# Patient Record
Sex: MALE | Race: BLACK OR AFRICAN AMERICAN | NOT HISPANIC OR LATINO | Employment: OTHER | URBAN - METROPOLITAN AREA
[De-identification: names, ages, dates, MRNs, and addresses within clinical notes are randomized per-mention and may not be internally consistent; named-entity substitution may affect disease eponyms.]

---

## 2017-03-03 ENCOUNTER — HISTORICAL (OUTPATIENT)
Dept: ADMINISTRATIVE | Facility: HOSPITAL | Age: 67
End: 2017-03-03

## 2017-08-31 ENCOUNTER — HISTORICAL (OUTPATIENT)
Dept: LAB | Facility: HOSPITAL | Age: 67
End: 2017-08-31

## 2017-09-05 LAB — FINAL CULTURE: NORMAL

## 2017-09-06 LAB — FINAL CULTURE: NORMAL

## 2017-09-28 ENCOUNTER — HISTORICAL (OUTPATIENT)
Dept: NEUROSURGERY | Facility: CLINIC | Age: 67
End: 2017-09-28

## 2017-09-28 ENCOUNTER — HISTORICAL (OUTPATIENT)
Dept: ADMINISTRATIVE | Facility: HOSPITAL | Age: 67
End: 2017-09-28

## 2017-09-28 LAB
ABS NEUT (OLG): 3.2 X10(3)/MCL (ref 2.1–9.2)
APTT PPP: 28.4 SECOND(S) (ref 24.8–36.9)
BASOPHILS # BLD AUTO: 0 X10(3)/MCL (ref 0–0.2)
BASOPHILS NFR BLD AUTO: 0 %
BUN SERPL-MCNC: 18 MG/DL (ref 7–18)
CALCIUM SERPL-MCNC: 9.1 MG/DL (ref 8.5–10.1)
CHLORIDE SERPL-SCNC: 107 MMOL/L (ref 98–107)
CO2 SERPL-SCNC: 25 MMOL/L (ref 21–32)
CREAT SERPL-MCNC: 1.18 MG/DL (ref 0.7–1.3)
EOSINOPHIL # BLD AUTO: 0.1 X10(3)/MCL (ref 0–0.9)
EOSINOPHIL NFR BLD AUTO: 3 %
ERYTHROCYTE [DISTWIDTH] IN BLOOD BY AUTOMATED COUNT: 15.2 % (ref 11.5–17)
GLUCOSE SERPL-MCNC: 94 MG/DL (ref 74–106)
HCT VFR BLD AUTO: 33.4 % (ref 42–52)
HGB BLD-MCNC: 10.8 GM/DL (ref 14–18)
INR PPP: 1.1 (ref 0–1.27)
LYMPHOCYTES # BLD AUTO: 0.8 X10(3)/MCL (ref 0.6–4.6)
LYMPHOCYTES NFR BLD AUTO: 16 %
MCH RBC QN AUTO: 32.6 PG (ref 27–31)
MCHC RBC AUTO-ENTMCNC: 32.3 GM/DL (ref 33–36)
MCV RBC AUTO: 100.9 FL (ref 80–94)
MONOCYTES # BLD AUTO: 0.5 X10(3)/MCL (ref 0.1–1.3)
MONOCYTES NFR BLD AUTO: 10 %
NEUTROPHILS # BLD AUTO: 3.2 X10(3)/MCL (ref 1.4–7.9)
NEUTROPHILS NFR BLD AUTO: 70 %
PLATELET # BLD AUTO: 205 X10(3)/MCL (ref 130–400)
PMV BLD AUTO: 10.1 FL (ref 9.4–12.4)
POTASSIUM SERPL-SCNC: 4 MMOL/L (ref 3.5–5.1)
PROTHROMBIN TIME: 14 SECOND(S) (ref 12.2–14.7)
RBC # BLD AUTO: 3.31 X10(6)/MCL (ref 4.7–6.1)
SODIUM SERPL-SCNC: 142 MMOL/L (ref 136–145)
WBC # SPEC AUTO: 4.6 X10(3)/MCL (ref 4.5–11.5)

## 2017-10-05 ENCOUNTER — HISTORICAL (OUTPATIENT)
Dept: ADMINISTRATIVE | Facility: HOSPITAL | Age: 67
End: 2017-10-05

## 2017-10-05 ENCOUNTER — HISTORICAL (OUTPATIENT)
Dept: NEUROSURGERY | Facility: CLINIC | Age: 67
End: 2017-10-05

## 2017-10-12 ENCOUNTER — HISTORICAL (OUTPATIENT)
Dept: NEUROSURGERY | Facility: CLINIC | Age: 67
End: 2017-10-12

## 2017-10-12 ENCOUNTER — HISTORICAL (OUTPATIENT)
Dept: ADMINISTRATIVE | Facility: HOSPITAL | Age: 67
End: 2017-10-12

## 2018-02-06 ENCOUNTER — HISTORICAL (OUTPATIENT)
Dept: ADMINISTRATIVE | Facility: HOSPITAL | Age: 68
End: 2018-02-06

## 2019-05-15 ENCOUNTER — HISTORICAL (OUTPATIENT)
Dept: RADIOLOGY | Facility: HOSPITAL | Age: 69
End: 2019-05-15

## 2019-06-10 ENCOUNTER — HISTORICAL (OUTPATIENT)
Dept: RADIOLOGY | Facility: HOSPITAL | Age: 69
End: 2019-06-10

## 2019-11-07 ENCOUNTER — HISTORICAL (OUTPATIENT)
Dept: ADMINISTRATIVE | Facility: HOSPITAL | Age: 69
End: 2019-11-07

## 2019-11-15 ENCOUNTER — HISTORICAL (OUTPATIENT)
Dept: LAB | Facility: HOSPITAL | Age: 69
End: 2019-11-15

## 2019-11-15 LAB
AMPHET UR QL SCN: NORMAL
BARBITURATE SCN PRESENT UR: NORMAL
BENZODIAZ UR QL SCN: NORMAL
CANNABINOIDS UR QL SCN: NORMAL
COCAINE UR QL SCN: NORMAL
MDMA UR QL SCN: NORMAL
METHADONE UR QL SCN: NORMAL
OPIATES UR QL SCN: NORMAL
PCP UR QL: NORMAL
PH UR STRIP.AUTO: 5.5 [PH] (ref 5–8)
TEMPERATURE, URINE (OHS): 22.8 DEGC (ref 20–25)

## 2019-11-18 ENCOUNTER — HISTORICAL (OUTPATIENT)
Dept: RADIOLOGY | Facility: HOSPITAL | Age: 69
End: 2019-11-18

## 2019-12-27 ENCOUNTER — OFFICE VISIT (OUTPATIENT)
Dept: UROLOGY | Facility: CLINIC | Age: 69
End: 2019-12-27
Payer: MEDICARE

## 2019-12-27 VITALS
WEIGHT: 217 LBS | SYSTOLIC BLOOD PRESSURE: 128 MMHG | DIASTOLIC BLOOD PRESSURE: 76 MMHG | BODY MASS INDEX: 29.39 KG/M2 | HEIGHT: 72 IN | RESPIRATION RATE: 18 BRPM

## 2019-12-27 DIAGNOSIS — C61 PROSTATE CANCER: ICD-10-CM

## 2019-12-27 DIAGNOSIS — N52.9 ERECTILE DYSFUNCTION, UNSPECIFIED ERECTILE DYSFUNCTION TYPE: Primary | ICD-10-CM

## 2019-12-27 PROCEDURE — 99214 OFFICE O/P EST MOD 30 MIN: CPT | Mod: S$GLB,,, | Performed by: UROLOGY

## 2019-12-27 PROCEDURE — 1159F PR MEDICATION LIST DOCUMENTED IN MEDICAL RECORD: ICD-10-PCS | Mod: S$GLB,,, | Performed by: UROLOGY

## 2019-12-27 PROCEDURE — 1159F MED LIST DOCD IN RCRD: CPT | Mod: S$GLB,,, | Performed by: UROLOGY

## 2019-12-27 PROCEDURE — 99214 PR OFFICE/OUTPT VISIT, EST, LEVL IV, 30-39 MIN: ICD-10-PCS | Mod: S$GLB,,, | Performed by: UROLOGY

## 2019-12-27 RX ORDER — BICALUTAMIDE 50 MG/1
50 TABLET, FILM COATED ORAL NIGHTLY
COMMUNITY
Start: 2018-11-06

## 2019-12-27 RX ORDER — CETIRIZINE HYDROCHLORIDE 10 MG/1
10 TABLET ORAL DAILY
COMMUNITY
Start: 2019-09-09

## 2019-12-27 RX ORDER — PANTOPRAZOLE SODIUM 40 MG/1
40 TABLET, DELAYED RELEASE ORAL DAILY
COMMUNITY
Start: 2019-12-16

## 2019-12-27 RX ORDER — FLUTICASONE PROPIONATE 50 MCG
1 SPRAY, SUSPENSION (ML) NASAL DAILY PRN
Status: ON HOLD | COMMUNITY
Start: 2019-10-23 | End: 2024-01-31 | Stop reason: ALTCHOICE

## 2019-12-27 RX ORDER — ALLOPURINOL 100 MG/1
100 TABLET ORAL
COMMUNITY
Start: 2019-11-15 | End: 2022-05-30

## 2019-12-27 RX ORDER — CLARITHROMYCIN 500 MG/1
TABLET, FILM COATED ORAL
Status: ON HOLD | COMMUNITY
Start: 2019-10-16 | End: 2022-07-27 | Stop reason: CLARIF

## 2019-12-27 RX ORDER — ATORVASTATIN CALCIUM 40 MG/1
40 TABLET, FILM COATED ORAL NIGHTLY
COMMUNITY
Start: 2016-01-24

## 2019-12-27 RX ORDER — COLCHICINE 0.6 MG/1
0.6 TABLET, FILM COATED ORAL 2 TIMES DAILY PRN
COMMUNITY
Start: 2019-10-10 | End: 2023-02-13

## 2019-12-27 RX ORDER — NITROGLYCERIN 0.4 MG/1
0.4 TABLET SUBLINGUAL EVERY 5 MIN PRN
COMMUNITY
Start: 2019-10-08

## 2019-12-27 RX ORDER — MELOXICAM 15 MG/1
15 TABLET ORAL
COMMUNITY
Start: 2019-11-18 | End: 2024-01-23 | Stop reason: CLARIF

## 2019-12-27 RX ORDER — ISOSORBIDE MONONITRATE 30 MG/1
30 TABLET, EXTENDED RELEASE ORAL NIGHTLY
COMMUNITY
Start: 2019-10-08

## 2019-12-27 RX ORDER — LISINOPRIL 20 MG/1
20 TABLET ORAL DAILY
COMMUNITY
Start: 2019-10-08

## 2019-12-27 RX ORDER — HYDROCODONE BITARTRATE AND ACETAMINOPHEN 10; 325 MG/1; MG/1
TABLET ORAL
COMMUNITY
Start: 2019-12-23 | End: 2022-05-20 | Stop reason: SDUPTHER

## 2019-12-27 RX ORDER — TOPIRAMATE 25 MG/1
25 TABLET ORAL
COMMUNITY
Start: 2019-11-15

## 2019-12-27 NOTE — PROGRESS NOTES
Subjective:       Patient ID: Rene Baer is a 69 y.o. male.    Chief Complaint: Other (SCHEDULE PROSTHESIS PLACEMENT)      HPI: 68 yo male with erectile dysfunction.  Patient also has prostate cancer which is being cared for elsewhere.  He had xrt and is on lupron and has had good psa readings.  In the past he has failed all oral agents and desires not to do self injection due to dexterity problems      Past Medical History:   Past Medical History:   Diagnosis Date    CAD (coronary artery disease)     Chronic headaches     Gout     HTN (hypertension)     Prostate cancer        Past Surgical Historical:   Past Surgical History:   Procedure Laterality Date    CERVICAL FUSION      INSERTION OF PACEMAKER      INTRAOPERATIVE RADIATION THERAPY      LUNG REMOVAL, PARTIAL          Medications:   Medication List with Changes/Refills   Current Medications    ALLOPURINOL (ZYLOPRIM) 100 MG TABLET    Take 100 mg by mouth.    ATORVASTATIN (LIPITOR) 40 MG TABLET    Take 40 mg by mouth.    BICALUTAMIDE (CASODEX) 50 MG TAB    Take 50 mg by mouth.    CETIRIZINE (ZYRTEC) 10 MG TABLET    Take 10 mg by mouth.    CLARITHROMYCIN (BIAXIN) 500 MG TABLET        COLCRYS 0.6 MG TABLET        FLUTICASONE PROPIONATE (FLONASE) 50 MCG/ACTUATION NASAL SPRAY        HYDROCODONE-ACETAMINOPHEN (NORCO)  MG PER TABLET        ISOSORBIDE MONONITRATE (IMDUR) 30 MG 24 HR TABLET        LEUPROLIDE ACETATE (LUPRON DEPOT IM)    Inject into the muscle.    LISINOPRIL (PRINIVIL,ZESTRIL) 20 MG TABLET        MELOXICAM (MOBIC) 15 MG TABLET        NITROGLYCERIN (NITROSTAT) 0.4 MG SL TABLET        PANTOPRAZOLE (PROTONIX) 40 MG TABLET        TOPIRAMATE (TOPAMAX) 25 MG TABLET            Past Social History:   Social History     Socioeconomic History    Marital status:      Spouse name: Not on file    Number of children: Not on file    Years of education: Not on file    Highest education level: Not on file   Occupational History    Not on file    Social Needs    Financial resource strain: Not on file    Food insecurity:     Worry: Not on file     Inability: Not on file    Transportation needs:     Medical: Not on file     Non-medical: Not on file   Tobacco Use    Smoking status: Never Smoker   Substance and Sexual Activity    Alcohol use: Yes     Comment: RARE    Drug use: Not on file    Sexual activity: Not on file   Lifestyle    Physical activity:     Days per week: Not on file     Minutes per session: Not on file    Stress: Not on file   Relationships    Social connections:     Talks on phone: Not on file     Gets together: Not on file     Attends Taoism service: Not on file     Active member of club or organization: Not on file     Attends meetings of clubs or organizations: Not on file     Relationship status: Not on file   Other Topics Concern    Not on file   Social History Narrative    Not on file       Allergies:   Review of patient's allergies indicates:   Allergen Reactions    Fentanyl         Family History: History reviewed. No pertinent family history.     Review of Systems:  Review of Systems   Constitutional: Negative for activity change and appetite change.   HENT: Negative for congestion and dental problem.    Eyes: Negative for visual disturbance.   Respiratory: Negative for chest tightness and shortness of breath.    Cardiovascular: Negative for chest pain.   Gastrointestinal: Negative for abdominal distention and abdominal pain.   Genitourinary: Negative for decreased urine volume, difficulty urinating, discharge, dysuria, enuresis, flank pain, frequency, genital sores, hematuria, penile pain, penile swelling, scrotal swelling, testicular pain and urgency.   Musculoskeletal: Negative for back pain and neck pain.   Skin: Negative for color change.   Neurological: Negative for dizziness.   Hematological: Negative for adenopathy.   Psychiatric/Behavioral: Negative for agitation, behavioral problems and confusion.        Physical Exam:  Physical Exam   Nursing note and vitals reviewed.  Constitutional: He is oriented to person, place, and time. He appears well-developed and well-nourished.   HENT:   Head: Normocephalic.   Eyes: Pupils are equal, round, and reactive to light.   Neck: Normal range of motion. Neck supple.   Cardiovascular: Normal rate, regular rhythm and normal heart sounds.    Pulmonary/Chest: Effort normal and breath sounds normal.   Abdominal: Soft. Bowel sounds are normal.   Genitourinary: Testes normal. Uncircumcised.         Musculoskeletal: Normal range of motion.   Neurological: He is alert and oriented to person, place, and time.   Skin: Skin is warm and dry.     Psychiatric: He has a normal mood and affect. His behavior is normal.       Assessment/Plan:     erectile dysfunction will plan penile prosthesis after getting cardiology clearance  Problem List Items Addressed This Visit     None

## 2020-01-10 ENCOUNTER — CLINICAL SUPPORT (OUTPATIENT)
Dept: UROLOGY | Facility: CLINIC | Age: 70
End: 2020-01-10
Payer: MEDICARE

## 2020-01-10 DIAGNOSIS — N52.9 ERECTILE DYSFUNCTION, UNSPECIFIED ERECTILE DYSFUNCTION TYPE: Primary | ICD-10-CM

## 2020-01-10 LAB
APPEARANCE, UA: CLEAR
APTT PPP: 25.5 SEC (ref 23.6–36.4)
BASOPHILS NFR SNV MANUAL: 0.4 % (ref 0–3)
BILIRUB UR QL STRIP: NEGATIVE MG/DL
BUN SERPL-MCNC: 21 MG/DL (ref 7–18)
BUN/CREAT SERPL: 16.27 RATIO (ref 7–18)
CALCIUM SERPL-MCNC: 9.5 MG/DL (ref 8.8–10.5)
CHLORIDE SERPL-SCNC: 106 MMOL/L (ref 100–108)
CO2 SERPL-SCNC: 25 MMOL/L (ref 21–32)
COLOR UR: NORMAL
CREAT SERPL-MCNC: 1.29 MG/DL (ref 0.7–1.3)
EOSINOPHIL NFR SNV MANUAL: 3.1 % (ref 1–3)
ERYTHROCYTE [DISTWIDTH] IN BLOOD BY AUTOMATED COUNT: 17 % (ref 12.5–18)
GFR ESTIMATION: > 60
GLUCOSE (UA): NORMAL MG/DL
GLUCOSE SERPL-MCNC: 101 MG/DL (ref 70–110)
HCT VFR BLD AUTO: 29 % (ref 42–52)
HGB BLD-MCNC: 9.1 G/DL (ref 14–18)
HGB UR QL STRIP: NEGATIVE /UL
INR PPP: 1 INR (ref 0.9–1.1)
KETONES UR QL STRIP: NEGATIVE MG/DL
LEUKOCYTE ESTERASE UR QL STRIP: NEGATIVE /UL
LYMPHOCYTES NFR SNV MANUAL: 15.7 % (ref 25–40)
MANUAL NRBC PER 100 CELLS: 0 %
MCH RBC QN AUTO: 28 PG (ref 27–31.2)
MCHC RBC AUTO-ENTMCNC: 31.4 G/DL (ref 31.8–35.4)
MCV RBC AUTO: 89.2 FL (ref 80–97)
MONOCYTES/100 LEUKOCYTES: 9.2 % (ref 1–15)
NEUTROPHILS NFR BLD: 3.73 10*3/UL (ref 1.8–7.7)
NEUTROPHILS NFR SNV MANUAL: 71.4 % (ref 37–80)
NITRITE UR QL STRIP: NEGATIVE
PH UR STRIP: 6 PH (ref 5–9)
PLATELETS: 251 10*3/UL (ref 142–424)
POTASSIUM SERPL-SCNC: 4 MMOL/L (ref 3.6–5.2)
PROT UR QL STRIP: NEGATIVE MG/DL
PROTHROMBIN TIME: 12.1 SEC (ref 10.6–13)
RBC # BLD AUTO: 3.25 10*6/UL (ref 4.7–6.1)
SODIUM BLD-SCNC: 140 MMOL/L (ref 135–145)
SP GR UR STRIP: 1.01 (ref 1–1.03)
SPECIMEN COLLECTION METHOD, URINE: NORMAL
UROBILINOGEN UR STRIP-ACNC: NORMAL MG/DL
WBC # BLD: 5.2 10*3/UL (ref 4.6–10.2)

## 2020-01-23 ENCOUNTER — OUTSIDE PLACE OF SERVICE (OUTPATIENT)
Dept: UROLOGY | Facility: CLINIC | Age: 70
End: 2020-01-23
Payer: MEDICARE

## 2020-01-23 PROCEDURE — 54405 INSERT MULTI-COMP PENIS PROS: CPT | Mod: ,,, | Performed by: UROLOGY

## 2020-01-23 PROCEDURE — 54405 PR INSERT,INFLATABLE PENILE PROSTHESIS: ICD-10-PCS | Mod: ,,, | Performed by: UROLOGY

## 2020-01-30 LAB — HEMOCCULT STL QL IA: NEGATIVE

## 2020-02-07 ENCOUNTER — OFFICE VISIT (OUTPATIENT)
Dept: UROLOGY | Facility: CLINIC | Age: 70
End: 2020-02-07
Payer: MEDICARE

## 2020-02-07 VITALS — DIASTOLIC BLOOD PRESSURE: 85 MMHG | RESPIRATION RATE: 16 BRPM | SYSTOLIC BLOOD PRESSURE: 144 MMHG

## 2020-02-07 DIAGNOSIS — N52.9 ERECTILE DYSFUNCTION, UNSPECIFIED ERECTILE DYSFUNCTION TYPE: Primary | ICD-10-CM

## 2020-02-07 PROCEDURE — 99024 POSTOP FOLLOW-UP VISIT: CPT | Mod: S$GLB,POP,, | Performed by: UROLOGY

## 2020-02-07 PROCEDURE — 99024 PR POST-OP FOLLOW-UP VISIT: ICD-10-PCS | Mod: S$GLB,POP,, | Performed by: UROLOGY

## 2020-02-07 NOTE — PROGRESS NOTES
Subjective:       Patient ID: Rene Baer is a 69 y.o. male.    Chief Complaint: Other (F/U penile prosthesis)      HPI: Post op prosthesis two weeks ago.  No complaints       Past Medical History:   Past Medical History:   Diagnosis Date    CAD (coronary artery disease)     Chronic headaches     Gout     HTN (hypertension)     Prostate cancer        Past Surgical Historical:   Past Surgical History:   Procedure Laterality Date    CERVICAL FUSION      INSERTION OF PACEMAKER      INTRAOPERATIVE RADIATION THERAPY      LUNG REMOVAL, PARTIAL      PENILE PROSTHESIS IMPLANT          Medications:   Medication List with Changes/Refills   Current Medications    ALLOPURINOL (ZYLOPRIM) 100 MG TABLET    Take 100 mg by mouth.    ATORVASTATIN (LIPITOR) 40 MG TABLET    Take 40 mg by mouth.    BICALUTAMIDE (CASODEX) 50 MG TAB    Take 50 mg by mouth.    CETIRIZINE (ZYRTEC) 10 MG TABLET    Take 10 mg by mouth.    CLARITHROMYCIN (BIAXIN) 500 MG TABLET        COLCRYS 0.6 MG TABLET        FLUTICASONE PROPIONATE (FLONASE) 50 MCG/ACTUATION NASAL SPRAY        HYDROCODONE-ACETAMINOPHEN (NORCO)  MG PER TABLET        ISOSORBIDE MONONITRATE (IMDUR) 30 MG 24 HR TABLET        LEUPROLIDE ACETATE (LUPRON DEPOT IM)    Inject into the muscle.    LISINOPRIL (PRINIVIL,ZESTRIL) 20 MG TABLET        MELOXICAM (MOBIC) 15 MG TABLET        NITROGLYCERIN (NITROSTAT) 0.4 MG SL TABLET        PANTOPRAZOLE (PROTONIX) 40 MG TABLET        TOPIRAMATE (TOPAMAX) 25 MG TABLET            Past Social History:   Social History     Socioeconomic History    Marital status:      Spouse name: Not on file    Number of children: Not on file    Years of education: Not on file    Highest education level: Not on file   Occupational History    Not on file   Social Needs    Financial resource strain: Not on file    Food insecurity:     Worry: Not on file     Inability: Not on file    Transportation needs:     Medical: Not on file     Non-medical:  Not on file   Tobacco Use    Smoking status: Never Smoker   Substance and Sexual Activity    Alcohol use: Yes     Comment: RARE    Drug use: Not on file    Sexual activity: Not on file   Lifestyle    Physical activity:     Days per week: Not on file     Minutes per session: Not on file    Stress: Not on file   Relationships    Social connections:     Talks on phone: Not on file     Gets together: Not on file     Attends Orthodox service: Not on file     Active member of club or organization: Not on file     Attends meetings of clubs or organizations: Not on file     Relationship status: Not on file   Other Topics Concern    Not on file   Social History Narrative    Not on file       Allergies:   Review of patient's allergies indicates:   Allergen Reactions    Fentanyl         Family History: History reviewed. No pertinent family history.     Review of Systems:  Review of Systems   Constitutional: Negative for activity change and appetite change.   HENT: Negative for congestion and dental problem.    Respiratory: Negative for chest tightness and shortness of breath.    Cardiovascular: Negative for chest pain.   Gastrointestinal: Negative for abdominal distention and abdominal pain.   Genitourinary: Negative for decreased urine volume, difficulty urinating, discharge, dysuria, enuresis, flank pain, frequency, genital sores, hematuria, penile pain, penile swelling, scrotal swelling, testicular pain and urgency.   Musculoskeletal: Negative for back pain and neck pain.   Neurological: Negative for dizziness.   Hematological: Negative for adenopathy.   Psychiatric/Behavioral: Negative for agitation, behavioral problems and confusion.       Physical Exam:  Physical Exam   Genitourinary:             Assessment/Plan:     post op penile prosthesis--fu in 4 weeks  Problem List Items Addressed This Visit     None

## 2020-03-06 ENCOUNTER — OFFICE VISIT (OUTPATIENT)
Dept: UROLOGY | Facility: CLINIC | Age: 70
End: 2020-03-06
Payer: MEDICARE

## 2020-03-06 VITALS — RESPIRATION RATE: 18 BRPM | SYSTOLIC BLOOD PRESSURE: 131 MMHG | DIASTOLIC BLOOD PRESSURE: 70 MMHG

## 2020-03-06 DIAGNOSIS — C61 PROSTATE CANCER: ICD-10-CM

## 2020-03-06 DIAGNOSIS — N52.9 ERECTILE DYSFUNCTION, UNSPECIFIED ERECTILE DYSFUNCTION TYPE: Primary | ICD-10-CM

## 2020-03-06 PROCEDURE — 99024 POSTOP FOLLOW-UP VISIT: CPT | Mod: S$GLB,POP,, | Performed by: UROLOGY

## 2020-03-06 PROCEDURE — 99024 PR POST-OP FOLLOW-UP VISIT: ICD-10-PCS | Mod: S$GLB,POP,, | Performed by: UROLOGY

## 2020-03-06 NOTE — PROGRESS NOTES
Subjective:       Patient ID: Rene Baer is a 69 y.o. male.    Chief Complaint: Other (4 week post op)      HPI: Post op penile prosthesis.  Also prostate cancer on lupron       Past Medical History:   Past Medical History:   Diagnosis Date    CAD (coronary artery disease)     Chronic headaches     Gout     HTN (hypertension)     Prostate cancer        Past Surgical Historical:   Past Surgical History:   Procedure Laterality Date    CERVICAL FUSION      INSERTION OF PACEMAKER      INTRAOPERATIVE RADIATION THERAPY      LUNG REMOVAL, PARTIAL      PENILE PROSTHESIS IMPLANT          Medications:   Medication List with Changes/Refills   Current Medications    ALLOPURINOL (ZYLOPRIM) 100 MG TABLET    Take 100 mg by mouth.    ATORVASTATIN (LIPITOR) 40 MG TABLET    Take 40 mg by mouth.    BICALUTAMIDE (CASODEX) 50 MG TAB    Take 50 mg by mouth.    CETIRIZINE (ZYRTEC) 10 MG TABLET    Take 10 mg by mouth.    CLARITHROMYCIN (BIAXIN) 500 MG TABLET        COLCRYS 0.6 MG TABLET        FLUTICASONE PROPIONATE (FLONASE) 50 MCG/ACTUATION NASAL SPRAY        HYDROCODONE-ACETAMINOPHEN (NORCO)  MG PER TABLET        ISOSORBIDE MONONITRATE (IMDUR) 30 MG 24 HR TABLET        LEUPROLIDE ACETATE (LUPRON DEPOT IM)    Inject into the muscle.    LISINOPRIL (PRINIVIL,ZESTRIL) 20 MG TABLET        MELOXICAM (MOBIC) 15 MG TABLET        NITROGLYCERIN (NITROSTAT) 0.4 MG SL TABLET        PANTOPRAZOLE (PROTONIX) 40 MG TABLET        TOPIRAMATE (TOPAMAX) 25 MG TABLET            Past Social History:   Social History     Socioeconomic History    Marital status:      Spouse name: Not on file    Number of children: Not on file    Years of education: Not on file    Highest education level: Not on file   Occupational History    Not on file   Social Needs    Financial resource strain: Not on file    Food insecurity:     Worry: Not on file     Inability: Not on file    Transportation needs:     Medical: Not on file     Non-medical:  Not on file   Tobacco Use    Smoking status: Never Smoker   Substance and Sexual Activity    Alcohol use: Yes     Comment: RARE    Drug use: Not on file    Sexual activity: Not on file   Lifestyle    Physical activity:     Days per week: Not on file     Minutes per session: Not on file    Stress: Not on file   Relationships    Social connections:     Talks on phone: Not on file     Gets together: Not on file     Attends Oriental orthodox service: Not on file     Active member of club or organization: Not on file     Attends meetings of clubs or organizations: Not on file     Relationship status: Not on file   Other Topics Concern    Not on file   Social History Narrative    Not on file       Allergies:   Review of patient's allergies indicates:   Allergen Reactions    Fentanyl         Family History: History reviewed. No pertinent family history.     Review of Systems:  Review of Systems   Constitutional: Negative for activity change and appetite change.   HENT: Negative for congestion and dental problem.    Respiratory: Negative for chest tightness and shortness of breath.    Cardiovascular: Negative for chest pain.   Gastrointestinal: Negative for abdominal distention and abdominal pain.   Genitourinary: Negative for decreased urine volume, difficulty urinating, discharge, dysuria, enuresis, flank pain, frequency, genital sores, hematuria, penile pain, penile swelling, scrotal swelling, testicular pain and urgency.   Musculoskeletal: Negative for back pain and neck pain.   Neurological: Negative for dizziness.   Hematological: Negative for adenopathy.   Psychiatric/Behavioral: Negative for agitation, behavioral problems and confusion.       Physical Exam:  Physical Exam   Nursing note and vitals reviewed.  Constitutional: He is oriented to person, place, and time. He appears well-developed and well-nourished.   HENT:   Head: Normocephalic.   Cardiovascular: Normal rate, regular rhythm and normal heart sounds.     Pulmonary/Chest: Effort normal and breath sounds normal.   Abdominal: Soft. Bowel sounds are normal.   Genitourinary:         Neurological: He is alert and oriented to person, place, and time.   Skin: Skin is warm and dry.         Assessment/Plan:     post op prosthesis may use it now    Prostate cancer is being treated by another rphysician    Fu one month  Problem List Items Addressed This Visit     None

## 2020-04-01 ENCOUNTER — OFFICE VISIT (OUTPATIENT)
Dept: UROLOGY | Facility: CLINIC | Age: 70
End: 2020-04-01
Payer: MEDICARE

## 2020-04-01 VITALS — SYSTOLIC BLOOD PRESSURE: 140 MMHG | HEART RATE: 64 BPM | DIASTOLIC BLOOD PRESSURE: 84 MMHG

## 2020-04-01 DIAGNOSIS — N52.9 ERECTILE DYSFUNCTION, UNSPECIFIED ERECTILE DYSFUNCTION TYPE: Primary | ICD-10-CM

## 2020-04-01 DIAGNOSIS — C61 PROSTATE CANCER: ICD-10-CM

## 2020-04-01 PROCEDURE — 99024 PR POST-OP FOLLOW-UP VISIT: ICD-10-PCS | Mod: S$GLB,POP,, | Performed by: UROLOGY

## 2020-04-01 PROCEDURE — 99024 POSTOP FOLLOW-UP VISIT: CPT | Mod: S$GLB,POP,, | Performed by: UROLOGY

## 2020-04-01 NOTE — PROGRESS NOTES
Subjective:       Patient ID: Rene Baer is a 69 y.o. male.    Chief Complaint: Other (prosthesis fu )      HPI: 69-year-old male, patient Dr. Webb, presents for one month re-evaluation.  Patient had a penile prosthesis placed in January 2020.  Patient has used his prosthesis.  He states he is not using it very often.  Patient denies complications.  Denies difficulty using pump.  Denies pain or burning urination.  Denies difficulty voiding.  Denies fever.  Denies weight loss.    Patient has history of prostate cancer.  He is on Lupron injections.  He is being managed by a doctor in the [facility].  No other urinary complaints.  All other health problems appear stable at this time.       Past Medical History:   Past Medical History:   Diagnosis Date    CAD (coronary artery disease)     Chronic headaches     Gout     HTN (hypertension)     Prostate cancer        Past Surgical Historical:   Past Surgical History:   Procedure Laterality Date    CERVICAL FUSION      INSERTION OF PACEMAKER      INTRAOPERATIVE RADIATION THERAPY      LUNG REMOVAL, PARTIAL      PENILE PROSTHESIS IMPLANT          Medications:   Medication List with Changes/Refills   Current Medications    ALLOPURINOL (ZYLOPRIM) 100 MG TABLET    Take 100 mg by mouth.    ATORVASTATIN (LIPITOR) 40 MG TABLET    Take 40 mg by mouth.    BICALUTAMIDE (CASODEX) 50 MG TAB    Take 50 mg by mouth.    CETIRIZINE (ZYRTEC) 10 MG TABLET    Take 10 mg by mouth.    CLARITHROMYCIN (BIAXIN) 500 MG TABLET        COLCRYS 0.6 MG TABLET        FLUTICASONE PROPIONATE (FLONASE) 50 MCG/ACTUATION NASAL SPRAY        HYDROCODONE-ACETAMINOPHEN (NORCO)  MG PER TABLET        ISOSORBIDE MONONITRATE (IMDUR) 30 MG 24 HR TABLET        LEUPROLIDE ACETATE (LUPRON DEPOT IM)    Inject into the muscle.    LISINOPRIL (PRINIVIL,ZESTRIL) 20 MG TABLET        MELOXICAM (MOBIC) 15 MG TABLET        NITROGLYCERIN (NITROSTAT) 0.4 MG SL TABLET        PANTOPRAZOLE (PROTONIX) 40 MG  TABLET        TOPIRAMATE (TOPAMAX) 25 MG TABLET            Past Social History:   Social History     Socioeconomic History    Marital status:      Spouse name: Not on file    Number of children: Not on file    Years of education: Not on file    Highest education level: Not on file   Occupational History    Not on file   Social Needs    Financial resource strain: Not on file    Food insecurity:     Worry: Not on file     Inability: Not on file    Transportation needs:     Medical: Not on file     Non-medical: Not on file   Tobacco Use    Smoking status: Never Smoker   Substance and Sexual Activity    Alcohol use: Yes     Comment: RARE    Drug use: Not on file    Sexual activity: Not on file   Lifestyle    Physical activity:     Days per week: Not on file     Minutes per session: Not on file    Stress: Not on file   Relationships    Social connections:     Talks on phone: Not on file     Gets together: Not on file     Attends Pentecostalism service: Not on file     Active member of club or organization: Not on file     Attends meetings of clubs or organizations: Not on file     Relationship status: Not on file   Other Topics Concern    Not on file   Social History Narrative    Not on file       Allergies:   Review of patient's allergies indicates:   Allergen Reactions    Fentanyl         Family History: History reviewed. No pertinent family history.     Review of Systems:  Review of Systems   Constitutional: Negative for activity change and appetite change.   HENT: Negative for congestion and dental problem.    Respiratory: Negative for chest tightness and shortness of breath.    Cardiovascular: Negative for chest pain.   Gastrointestinal: Negative for abdominal distention and abdominal pain.   Genitourinary: Negative for decreased urine volume, difficulty urinating, discharge, dysuria, enuresis, flank pain, frequency, genital sores, hematuria, penile pain, penile swelling, scrotal swelling,  testicular pain and urgency.   Musculoskeletal: Negative for back pain and neck pain.   Neurological: Negative for dizziness.   Hematological: Negative for adenopathy.   Psychiatric/Behavioral: Negative for agitation, behavioral problems and confusion.       Physical Exam:  Physical Exam   Nursing note and vitals reviewed.  Constitutional: He is oriented to person, place, and time. He appears well-developed and well-nourished.   HENT:   Head: Normocephalic.   Cardiovascular: Normal rate, regular rhythm and normal heart sounds.    Pulmonary/Chest: Effort normal and breath sounds normal.   Abdominal: Soft. Bowel sounds are normal.   Genitourinary: Penis normal.   Genitourinary Comments: Pump is functioning well.  Able to inflate and deflate pump without complications.  Patient tolerated well.   Neurological: He is alert and oriented to person, place, and time.   Skin: Skin is warm and dry.         Assessment/Plan:   1.  Erectile dysfunction:  Patient is doing well post penile placement in January 2020.  Patient states he is able to operate the pump without difficulty.  Follow-up was inflated and deflated in office without complications.  Patient tolerated well.  Patient instructed to inflate and deflate the pump 3 times a week regardless with her using it or not.  Patient stated understanding.    2.  Prostate cancer:  Patient is on Lupron injections, however he is seeing a doctor in the [place] area.  Patient encouraged keep his appointment without doctor.  Patient states he sees a doctor every 3 months.    Patient will follow up in 6 months for re-evaluation, sooner if needed.  Problem List Items Addressed This Visit     None      Visit Diagnoses     Erectile dysfunction, unspecified erectile dysfunction type    -  Primary    Prostate cancer

## 2020-06-11 ENCOUNTER — HISTORICAL (OUTPATIENT)
Dept: SURGERY | Facility: HOSPITAL | Age: 70
End: 2020-06-11

## 2020-06-11 LAB
ABS NEUT (OLG): 3.27 X10(3)/MCL (ref 2.1–9.2)
BASOPHILS # BLD AUTO: 0 X10(3)/MCL (ref 0–0.2)
BASOPHILS NFR BLD AUTO: 0 %
BUN SERPL-MCNC: 24.2 MG/DL (ref 8.4–25.7)
CALCIUM SERPL-MCNC: 9.2 MG/DL (ref 8.8–10)
CHLORIDE SERPL-SCNC: 106 MMOL/L (ref 98–107)
CO2 SERPL-SCNC: 21 MMOL/L (ref 23–31)
CREAT SERPL-MCNC: 1.18 MG/DL (ref 0.73–1.18)
CREAT/UREA NIT SERPL: 21
EOSINOPHIL # BLD AUTO: 0.2 X10(3)/MCL (ref 0–0.9)
EOSINOPHIL NFR BLD AUTO: 4 %
ERYTHROCYTE [DISTWIDTH] IN BLOOD BY AUTOMATED COUNT: 16 % (ref 11.5–17)
GLUCOSE SERPL-MCNC: 98 MG/DL (ref 82–115)
HCT VFR BLD AUTO: 32.8 % (ref 42–52)
HGB BLD-MCNC: 10.2 GM/DL (ref 14–18)
INR PPP: 1 (ref 0–1.3)
LYMPHOCYTES # BLD AUTO: 0.5 X10(3)/MCL (ref 0.6–4.6)
LYMPHOCYTES NFR BLD AUTO: 11 %
MCH RBC QN AUTO: 30.5 PG (ref 27–31)
MCHC RBC AUTO-ENTMCNC: 31.1 GM/DL (ref 33–36)
MCV RBC AUTO: 98.2 FL (ref 80–94)
MONOCYTES # BLD AUTO: 0.6 X10(3)/MCL (ref 0.1–1.3)
MONOCYTES NFR BLD AUTO: 13 %
NEUTROPHILS # BLD AUTO: 3.27 X10(3)/MCL (ref 2.1–9.2)
NEUTROPHILS NFR BLD AUTO: 71 %
PLATELET # BLD AUTO: 232 X10(3)/MCL (ref 130–400)
PMV BLD AUTO: 11 FL (ref 9.4–12.4)
POTASSIUM SERPL-SCNC: 4.2 MMOL/L (ref 3.5–5.1)
PROTHROMBIN TIME: 12.8 SECOND(S) (ref 11.1–13.7)
RBC # BLD AUTO: 3.34 X10(6)/MCL (ref 4.7–6.1)
SODIUM SERPL-SCNC: 138 MMOL/L (ref 136–145)
WBC # SPEC AUTO: 4.6 X10(3)/MCL (ref 4.5–11.5)

## 2020-06-18 ENCOUNTER — HISTORICAL (OUTPATIENT)
Dept: SURGERY | Facility: HOSPITAL | Age: 70
End: 2020-06-18

## 2020-07-07 ENCOUNTER — HISTORICAL (OUTPATIENT)
Dept: SURGERY | Facility: HOSPITAL | Age: 70
End: 2020-07-07

## 2020-11-11 ENCOUNTER — OFFICE VISIT (OUTPATIENT)
Dept: UROLOGY | Facility: CLINIC | Age: 70
End: 2020-11-11
Payer: MEDICARE

## 2020-11-11 VITALS
HEART RATE: 86 BPM | WEIGHT: 217 LBS | RESPIRATION RATE: 20 BRPM | DIASTOLIC BLOOD PRESSURE: 82 MMHG | SYSTOLIC BLOOD PRESSURE: 140 MMHG | BODY MASS INDEX: 29.39 KG/M2 | HEIGHT: 72 IN

## 2020-11-11 DIAGNOSIS — N52.9 ERECTILE DYSFUNCTION, UNSPECIFIED ERECTILE DYSFUNCTION TYPE: Primary | ICD-10-CM

## 2020-11-11 PROCEDURE — 99213 OFFICE O/P EST LOW 20 MIN: CPT | Mod: S$GLB,,, | Performed by: UROLOGY

## 2020-11-11 PROCEDURE — 99213 PR OFFICE/OUTPT VISIT, EST, LEVL III, 20-29 MIN: ICD-10-PCS | Mod: S$GLB,,, | Performed by: UROLOGY

## 2020-11-11 NOTE — PROGRESS NOTES
Subjective:       Patient ID: Rene Baer is a 69 y.o. male.    Chief Complaint: Erectile Dysfunction (6 mth F/U)      HPI: 70 yo male fu ED sp placement of penile prosthesis by me in past.  Pt also has prostate cancer being followed elsewhere.  Pt states prosthesis working well       Past Medical History:   Past Medical History:   Diagnosis Date    CAD (coronary artery disease)     Chronic headaches     Gout     HTN (hypertension)     Prostate cancer        Past Surgical Historical:   Past Surgical History:   Procedure Laterality Date    CERVICAL FUSION      INSERTION OF PACEMAKER      INTRAOPERATIVE RADIATION THERAPY      LUNG REMOVAL, PARTIAL      PENILE PROSTHESIS IMPLANT          Medications:   Medication List with Changes/Refills   Current Medications    ALLOPURINOL (ZYLOPRIM) 100 MG TABLET    Take 100 mg by mouth.    ATORVASTATIN (LIPITOR) 40 MG TABLET    Take 40 mg by mouth.    BICALUTAMIDE (CASODEX) 50 MG TAB    Take 50 mg by mouth.    CETIRIZINE (ZYRTEC) 10 MG TABLET    Take 10 mg by mouth.    CLARITHROMYCIN (BIAXIN) 500 MG TABLET        COLCRYS 0.6 MG TABLET        FLUTICASONE PROPIONATE (FLONASE) 50 MCG/ACTUATION NASAL SPRAY        HYDROCODONE-ACETAMINOPHEN (NORCO)  MG PER TABLET        ISOSORBIDE MONONITRATE (IMDUR) 30 MG 24 HR TABLET        LEUPROLIDE ACETATE (LUPRON DEPOT IM)    Inject into the muscle.    LISINOPRIL (PRINIVIL,ZESTRIL) 20 MG TABLET        MELOXICAM (MOBIC) 15 MG TABLET        NITROGLYCERIN (NITROSTAT) 0.4 MG SL TABLET        PANTOPRAZOLE (PROTONIX) 40 MG TABLET        TOPIRAMATE (TOPAMAX) 25 MG TABLET            Past Social History:   Social History     Socioeconomic History    Marital status:      Spouse name: Not on file    Number of children: Not on file    Years of education: Not on file    Highest education level: Not on file   Occupational History    Not on file   Social Needs    Financial resource strain: Not on file    Food insecurity     Worry:  Not on file     Inability: Not on file    Transportation needs     Medical: Not on file     Non-medical: Not on file   Tobacco Use    Smoking status: Never Smoker   Substance and Sexual Activity    Alcohol use: Yes     Comment: RARE    Drug use: Not on file    Sexual activity: Not on file   Lifestyle    Physical activity     Days per week: Not on file     Minutes per session: Not on file    Stress: Not on file   Relationships    Social connections     Talks on phone: Not on file     Gets together: Not on file     Attends Evangelical service: Not on file     Active member of club or organization: Not on file     Attends meetings of clubs or organizations: Not on file     Relationship status: Not on file   Other Topics Concern    Not on file   Social History Narrative    Not on file       Allergies:   Review of patient's allergies indicates:   Allergen Reactions    Fentanyl         Family History: History reviewed. No pertinent family history.     Review of Systems:  Review of Systems   Constitutional: Negative for activity change and appetite change.   HENT: Negative for congestion and dental problem.    Respiratory: Negative for chest tightness and shortness of breath.    Cardiovascular: Negative for chest pain.   Gastrointestinal: Negative for abdominal distention and abdominal pain.   Genitourinary: Negative for decreased urine volume, difficulty urinating, discharge, dysuria, enuresis, flank pain, frequency, genital sores, hematuria, penile pain, penile swelling, scrotal swelling, testicular pain and urgency.   Musculoskeletal: Negative for back pain and neck pain.   Neurological: Negative for dizziness.   Hematological: Negative for adenopathy.   Psychiatric/Behavioral: Negative for agitation, behavioral problems and confusion.       Physical Exam:  Physical Exam  Vitals signs and nursing note reviewed.   Constitutional:       Appearance: He is well-developed.   HENT:      Head: Normocephalic.    Cardiovascular:      Rate and Rhythm: Normal rate and regular rhythm.      Heart sounds: Normal heart sounds.   Pulmonary:      Effort: Pulmonary effort is normal.      Breath sounds: Normal breath sounds.   Abdominal:      General: Bowel sounds are normal.      Palpations: Abdomen is soft.   Genitourinary:      Skin:     General: Skin is warm and dry.   Neurological:      Mental Status: He is alert and oriented to person, place, and time.         Assessment/Plan:     ED with well functioning prosthesis fu in one year  Problem List Items Addressed This Visit     None

## 2020-12-08 ENCOUNTER — HISTORICAL (OUTPATIENT)
Dept: RADIOLOGY | Facility: HOSPITAL | Age: 70
End: 2020-12-08

## 2021-01-14 ENCOUNTER — HISTORICAL (OUTPATIENT)
Dept: SURGERY | Facility: HOSPITAL | Age: 71
End: 2021-01-14

## 2021-01-14 LAB
ABS NEUT (OLG): 3.14 X10(3)/MCL (ref 2.1–9.2)
BASOPHILS # BLD AUTO: 0 X10(3)/MCL (ref 0–0.2)
BASOPHILS NFR BLD AUTO: 0 %
BUN SERPL-MCNC: 24.8 MG/DL (ref 8.4–25.7)
CALCIUM SERPL-MCNC: 8.9 MG/DL (ref 8.8–10)
CHLORIDE SERPL-SCNC: 105 MMOL/L (ref 98–107)
CO2 SERPL-SCNC: 24 MMOL/L (ref 23–31)
CREAT SERPL-MCNC: 1.12 MG/DL (ref 0.73–1.18)
CREAT/UREA NIT SERPL: 22
EOSINOPHIL # BLD AUTO: 0.1 X10(3)/MCL (ref 0–0.9)
EOSINOPHIL NFR BLD AUTO: 2 %
ERYTHROCYTE [DISTWIDTH] IN BLOOD BY AUTOMATED COUNT: 13.8 % (ref 11.5–17)
GLUCOSE SERPL-MCNC: 99 MG/DL (ref 82–115)
HCT VFR BLD AUTO: 31.5 % (ref 42–52)
HGB BLD-MCNC: 9.7 GM/DL (ref 14–18)
INR PPP: 1 (ref 0–1.3)
LYMPHOCYTES # BLD AUTO: 0.9 X10(3)/MCL (ref 0.6–4.6)
LYMPHOCYTES NFR BLD AUTO: 19 %
MCH RBC QN AUTO: 30.6 PG (ref 27–31)
MCHC RBC AUTO-ENTMCNC: 30.8 GM/DL (ref 33–36)
MCV RBC AUTO: 99.4 FL (ref 80–94)
MONOCYTES # BLD AUTO: 0.5 X10(3)/MCL (ref 0.1–1.3)
MONOCYTES NFR BLD AUTO: 11 %
NEUTROPHILS # BLD AUTO: 3.14 X10(3)/MCL (ref 2.1–9.2)
NEUTROPHILS NFR BLD AUTO: 67 %
PLATELET # BLD AUTO: 213 X10(3)/MCL (ref 130–400)
PMV BLD AUTO: 11.2 FL (ref 9.4–12.4)
POTASSIUM SERPL-SCNC: 4.6 MMOL/L (ref 3.5–5.1)
PROTHROMBIN TIME: 12.5 SECOND(S) (ref 11.1–13.7)
RBC # BLD AUTO: 3.17 X10(6)/MCL (ref 4.7–6.1)
SODIUM SERPL-SCNC: 138 MMOL/L (ref 136–145)
WBC # SPEC AUTO: 4.7 X10(3)/MCL (ref 4.5–11.5)

## 2021-03-31 ENCOUNTER — HISTORICAL (OUTPATIENT)
Dept: RADIOLOGY | Facility: HOSPITAL | Age: 71
End: 2021-03-31

## 2021-04-23 ENCOUNTER — HISTORICAL (OUTPATIENT)
Dept: RADIOLOGY | Facility: HOSPITAL | Age: 71
End: 2021-04-23

## 2021-04-26 ENCOUNTER — HISTORICAL (OUTPATIENT)
Dept: CARDIOLOGY | Facility: HOSPITAL | Age: 71
End: 2021-04-26

## 2021-06-09 ENCOUNTER — HISTORICAL (OUTPATIENT)
Dept: RADIOLOGY | Facility: HOSPITAL | Age: 71
End: 2021-06-09

## 2021-07-06 ENCOUNTER — HISTORICAL (OUTPATIENT)
Dept: SURGERY | Facility: HOSPITAL | Age: 71
End: 2021-07-06

## 2021-07-06 LAB
BUN SERPL-MCNC: 21.5 MG/DL (ref 8.4–25.7)
CALCIUM SERPL-MCNC: 9.7 MG/DL (ref 8.8–10)
CHLORIDE SERPL-SCNC: 109 MMOL/L (ref 98–107)
CO2 SERPL-SCNC: 23 MMOL/L (ref 23–31)
CREAT SERPL-MCNC: 1.09 MG/DL (ref 0.73–1.18)
CREAT/UREA NIT SERPL: 20
GLUCOSE SERPL-MCNC: 103 MG/DL (ref 82–115)
HCT VFR BLD AUTO: 31.8 % (ref 42–52)
HGB BLD-MCNC: 9.9 GM/DL (ref 14–18)
INR PPP: 1 (ref 0–1.3)
PLATELET # BLD AUTO: 256 X10(3)/MCL (ref 130–400)
POTASSIUM SERPL-SCNC: 4.5 MMOL/L (ref 3.5–5.1)
PROTHROMBIN TIME: 12.9 SECOND(S) (ref 12.5–14.5)
SODIUM SERPL-SCNC: 141 MMOL/L (ref 136–145)

## 2021-11-03 ENCOUNTER — HISTORICAL (OUTPATIENT)
Dept: LAB | Facility: HOSPITAL | Age: 71
End: 2021-11-03

## 2021-11-03 LAB
AMPHET UR QL SCN: NEGATIVE
BARBITURATE SCN PRESENT UR: NEGATIVE
BENZODIAZ UR QL SCN: NEGATIVE
CANNABINOIDS UR QL SCN: NEGATIVE
COCAINE UR QL SCN: NEGATIVE
FENTANYL UR QL SCN: NEGATIVE
MDMA UR QL SCN: NEGATIVE
METHADONE UR QL SCN: NEGATIVE
OPIATES UR QL SCN: POSITIVE
PCP UR QL: NEGATIVE
PH UR STRIP.AUTO: 5.5 [PH]

## 2021-11-10 ENCOUNTER — OFFICE VISIT (OUTPATIENT)
Dept: UROLOGY | Facility: CLINIC | Age: 71
End: 2021-11-10
Payer: MEDICARE

## 2021-11-10 DIAGNOSIS — N52.9 ERECTILE DYSFUNCTION, UNSPECIFIED ERECTILE DYSFUNCTION TYPE: Primary | ICD-10-CM

## 2021-11-10 PROCEDURE — 99213 OFFICE O/P EST LOW 20 MIN: CPT | Mod: S$GLB,,, | Performed by: NURSE PRACTITIONER

## 2021-11-10 PROCEDURE — 99213 PR OFFICE/OUTPT VISIT, EST, LEVL III, 20-29 MIN: ICD-10-PCS | Mod: S$GLB,,, | Performed by: NURSE PRACTITIONER

## 2021-11-10 RX ORDER — DICLOFENAC SODIUM 75 MG/1
75 TABLET, DELAYED RELEASE ORAL 2 TIMES DAILY PRN
COMMUNITY
Start: 2021-10-21 | End: 2024-01-23 | Stop reason: CLARIF

## 2021-11-10 RX ORDER — ASPIRIN 81 MG/1
81 TABLET ORAL EVERY 4 HOURS PRN
COMMUNITY
Start: 2021-06-30 | End: 2024-01-23 | Stop reason: CLARIF

## 2022-01-26 ENCOUNTER — HISTORICAL (OUTPATIENT)
Dept: LAB | Facility: HOSPITAL | Age: 72
End: 2022-01-26

## 2022-01-26 LAB
APPEARANCE, UA: CLEAR
BACTERIA SPEC CULT: ABNORMAL /HPF
BILIRUB UR QL STRIP: NEGATIVE
C3 SERPL-MCNC: 194 MG/DL (ref 80–173)
COLOR UR: YELLOW
CREAT UR-MCNC: 150.1 MG/DL (ref 58–161)
GLUCOSE (UA): NEGATIVE MG/DL
HGB UR QL STRIP: ABNORMAL
KETONES UR QL STRIP: NEGATIVE MG/DL
LEUKOCYTE ESTERASE UR QL STRIP: NEGATIVE
NITRITE UR QL STRIP: NEGATIVE
PH UR STRIP: 5 [PH] (ref 4.6–8)
PROT UR QL STRIP: NEGATIVE MG/DL
PROT UR STRIP-MCNC: 17 MG/DL
PROT/CREAT UR-RTO: 113 MG/DL
PSA SERPL-MCNC: <0.1 NG/ML
RBC #/AREA URNS HPF: ABNORMAL /HPF
SP GR UR STRIP: 1.02 (ref 1–1.03)
SQUAMOUS EPITHELIAL, UA: ABNORMAL
UROBILINOGEN UR STRIP-ACNC: 0.2 EU/DL
WBC #/AREA URNS HPF: ABNORMAL /[HPF]

## 2022-01-27 LAB
ANTINUCLEAR ANTIBODY SCREEN (OHS): NEGATIVE
DSDNA AB QUANT (OHS): 1.1
DSDNA ANTIBODY (OHS): NEGATIVE

## 2022-02-02 ENCOUNTER — HISTORICAL (OUTPATIENT)
Dept: RADIOLOGY | Facility: HOSPITAL | Age: 72
End: 2022-02-02

## 2022-02-02 ENCOUNTER — HISTORICAL (OUTPATIENT)
Dept: ADMINISTRATIVE | Facility: HOSPITAL | Age: 72
End: 2022-02-02

## 2022-04-11 ENCOUNTER — HISTORICAL (OUTPATIENT)
Dept: ADMINISTRATIVE | Facility: HOSPITAL | Age: 72
End: 2022-04-11
Payer: MEDICARE

## 2022-04-25 VITALS
WEIGHT: 229.25 LBS | SYSTOLIC BLOOD PRESSURE: 158 MMHG | BODY MASS INDEX: 31.05 KG/M2 | DIASTOLIC BLOOD PRESSURE: 82 MMHG | HEIGHT: 72 IN

## 2022-04-30 NOTE — OP NOTE
DATE OF SURGERY:    10/12/2017    SURGEON:  Alex Campbell MD    PREOPERATIVE DIAGNOSIS:  Lumbar degenerative disc disease, lumbar radiculopathy.    POSTOPERATIVE DIAGNOSIS:  Lumbar degenerative disc disease, lumbar radiculopathy.    PROCEDURE:  Fluoroscopically guided transforaminal lumbar epidural injections at left L3-4 and left L4-5.    EQUIPMENT USED:  Epidural tray.    DETAILED DESCRIPTION:  Following informed consent, the patient was prepped and draped in the usual sterile fashion.  I infiltrated the tissue overlying L3-4 and L4-5 with local anesthetic.  Under fluoroscopic guidance, I advanced a 22 gauge 3.5 inch BD spinal needle into the epidural space via left transforaminal approach.  Positioning was confirmed at each location with administration of contrast.  I then instilled divided between the two needles a combination of 160 mg Depo-Medrol and 1 ml of 5% dextrose in water.  I removed the needles and applied a sterile dressing.  The patient tolerated the procedure well without apparent complication.    IMPRESSION:  Successful fluoroscopically guided transforaminal lumbar epidural injection at left L3-4 and left L4-5.        ______________________________  MD STEVE Hernández/LEROY  DD:  10/12/2017  Time:  10:04AM  DT:  10/13/2017  Time:  08:57AM  Job #:  [id number]

## 2022-04-30 NOTE — OP NOTE
Patient:   Rene Baer            MRN: [id number]            FIN: [id number]               Age:   69 years     Sex:  Male     :  1950   Associated Diagnoses:   None   Author:   Alex Campbell MD      DATE OF SURGERY:    2020    SURGEON:  Alex Campbell MD    PREOPERATIVE DIAGNOSIS:  Lumbar radiculopathy.    POSTOPERATIVE DIAGNOSIS:  Lumbar radiculopathy.    PROCEDURE PERFORMED:  Fluoroscopically-guided transforaminal lumbar epidural injections at right L3-4, L4-5.    EQUIPMENT USED:  Epidural tray.    DESCRIPTION OF PROCEDURE:  Following informed consent, the patient was prepped and draped in the usual sterile fashion.  I infiltrated the tissue overlying L3-4, L4-5 with local anesthetic.  Under fluoroscopic guidance, I advanced a 22-gauge 3.5 inch BD spinal needle into the epidural space via right transforaminal approaches.  Positioning was confirmed at each location with administration of contrast.  I then instilled divided between the 2 needles a combination of 160 mg Depo-Medrol and 1 mL of 5% dextrose in water.  I removed the needles and applied sterile dressings.  The patient tolerated the procedure well without apparent complication.    IMPRESSION:  Successful fluoroscopically-guided transforaminal lumbar epidural injection at right L3-4, L4-5.

## 2022-04-30 NOTE — OP NOTE
Patient:   Rene Baer            MRN: [id number]            FIN: [id number]               Age:   70 years     Sex:  Male     :  1950   Associated Diagnoses:   None   Author:   Alex Campbell MD      DATE OF SURGERY:    2021    SURGEON:  Alex Campbell MD    PREOPERATIVE DIAGNOSIS:  Lumbar radiculopathy.    POSTOPERATIVE DIAGNOSIS:  Lumbar radiculopathy.    PROCEDURE PERFORMED:  Fluoroscopically-guided transforaminal lumbar epidural injections at right L3-4, L4-5.    EQUIPMENT USED:  Epidural tray.    DESCRIPTION OF PROCEDURE:  Following informed consent, the patient was prepped and draped in the usual sterile fashion.  I infiltrated the tissue overlying L3-4, L4-5 with local anesthetic.  Under fluoroscopic guidance, I advanced a 22-gauge 3.5 inch BD spinal needle into the epidural space via right transforaminal approaches.  Positioning was confirmed at each location with administration of contrast.  I then instilled divided between the 2 needles a combination of 160 mg Depo-Medrol and 1 mL of 5% dextrose in water.  I removed the needles and applied sterile dressings.  The patient tolerated the procedure well without apparent complication.    IMPRESSION:  Successful fluoroscopically-guided transforaminal lumbar epidural injection at right L3-4, L4-5.       Milli Shin

## 2022-04-30 NOTE — OP NOTE
Patient:   Rene Baer            MRN: [id number]            FIN: [id number]               Age:   69 years     Sex:  Male     :  1950   Associated Diagnoses:   None   Author:   Alex Campbell MD      DATE OF SURGERY:  20        SURGEON:  Alex Campbell MD    PREOPERATIVE DIAGNOSIS:  Cervical radiculopathy.    POSTOPERATIVE DIAGNOSE:  Cervical radiculopathy.    PROCEDURE PERFORMED:  Fluoroscopically-guided intralaminar cervical epidural injection at C4-5.    EQUIPMENT USED:  Epidural tray.    DETAILED DESCRIPTION:  Following informed consent, the patient was prepped and draped in the usual sterile fashion.  I infiltrated the tissue overlying C4-5with local anesthetic.  Under fluoroscopic guidance, I advanced a 25-gauge 3.5 inch BD spinal needle into the epidural space.  Positioning was confirmed with administration of contrast.  I then instilled a combination of 160 mg Depo-Medrol and 1 mL of 5% dextrose in water.  I removed the needle and applied a sterile dressing.  The patient tolerated the procedure well without apparent complication.    IMPRESSION:  Successful fluoroscopically-guided intralaminar cervical epidural injection at C4-5.

## 2022-04-30 NOTE — OP NOTE
DATE OF SURGERY:    10/05/2017    SURGEON:  Alex Campbell MD    PREOPERATIVE DIAGNOSIS:  Lumbar radiculopathy.    POSTOPERATIVE DIAGNOSIS:  Lumbar radiculopathy.    PROCEDURE:  Fluoroscopically guided transforaminal lumbar epidural injections at left L3-4 and left L4-5.    EQUIPMENT USED:  Epidural tray.    PROCEDURE IN DETAIL:  Following informed consent, the patient was prepped and draped in the usual sterile fashion.  I infiltrated the tissue overlying L3-4 and L4-5 with local anesthetic.  Under fluoroscopic guidance, I advanced a 22-gauge, 3.5 inch BD spinal needle into the epidural space via left transforaminal approaches.  Positioning was confirmed at each level with administration of contrast.  I then instilled, divided between the two needles, a combination of 160 mg of Depo-Medrol and 1 ml of 5% dextrose in water.  I removed the needles and applied a sterile dressing.  The patient tolerated the procedure well without apparent complication.    IMPRESSION:  Successful fluoroscopically guided transforaminal lumbar epidural injections at left L3-4 and left L4-5.        ______________________________  MD STEVE Hernández/ADAM  DD:  10/05/2017  Time:  11:58AM  DT:  10/06/2017  Time:  01:16PM  Job #:  [id number]

## 2022-04-30 NOTE — OP NOTE
Patient:   Rene Baer            MRN: [id number]            FIN: [id number]               Age:   69 years     Sex:  Male     :  1950   Associated Diagnoses:   None   Author:   Alex Campbell MD      DATE OF SURGERY:  20        SURGEON:  Alex Campbell MD    PREOPERATIVE DIAGNOSIS:  Cervical radiculopathy.    POSTOPERATIVE DIAGNOSE:  Cervical radiculopathy.    PROCEDURE PERFORMED:  Fluoroscopically-guided intralaminar cervical epidural injection at C4-5.    EQUIPMENT USED:  Epidural tray.    DETAILED DESCRIPTION:  Following informed consent, the patient was prepped and draped in the usual sterile fashion.  I infiltrated the tissue overlying C4-5with local anesthetic.  Under fluoroscopic guidance, I advanced a 25-gauge 3.5 inch BD spinal needle into the epidural space.  Positioning was confirmed with administration of contrast.  I then instilled a combination of 160 mg Depo-Medrol and 1 mL of 5% dextrose in water.  I removed the needle and applied a sterile dressing.  The patient tolerated the procedure well without apparent complication.    IMPRESSION:  Successful fluoroscopically-guided intralaminar cervical epidural injection at C4-5.

## 2022-04-30 NOTE — OP NOTE
Patient:   Rene Baer            MRN: [id number]            FIN: [id number]               Age:   69 years     Sex:  Male     :  1950   Associated Diagnoses:   None   Author:   Alex Campbell MD      DATE OF SURGERY:    2020    SURGEON:  Alex Campbell MD    PREOPERATIVE DIAGNOSIS:  Lumbar radiculopathy.    POSTOPERATIVE DIAGNOSIS:  Lumbar radiculopathy.    PROCEDURE PERFORMED:  Fluoroscopically-guided transforaminal lumbar epidural injections at right L3-4, L4-5.    EQUIPMENT USED:  Epidural tray.    DESCRIPTION OF PROCEDURE:  Following informed consent, the patient was prepped and draped in the usual sterile fashion.  I infiltrated the tissue overlying L3-4, L4-5 with local anesthetic.  Under fluoroscopic guidance, I advanced a 22-gauge 3.5 inch BD spinal needle into the epidural space via right transforaminal approaches.  Positioning was confirmed at each location with administration of contrast.  I then instilled divided between the 2 needles a combination of 160 mg Depo-Medrol and 1 mL of 5% dextrose in water.  I removed the needles and applied sterile dressings.  The patient tolerated the procedure well without apparent complication.    IMPRESSION:  Successful fluoroscopically-guided transforaminal lumbar epidural injection at right L3-4, L4-5.

## 2022-04-30 NOTE — OP NOTE
DATE OF SURGERY:    02/06/2018    SURGEON:  Alex Campbell MD    PREOPERATIVE DIAGNOSIS:  Lumbar degenerative disc disease, lumbar radiculopathy.    POSTOPERATIVE DIAGNOSIS:  Lumbar degenerative disc disease, lumbar radiculopathy.    PROCEDURE:  Fluoroscopically guided transforaminal lumbar epidural injections at left L3-4 and left L4-5.    EQUIPMENT USED:  Epidural tray.    DETAILED DESCRIPTION:  Following informed consent, the patient was prepped and draped in the usual sterile fashion.  I infiltrated the tissue overlying L3-4 and L4-5 with local anesthetic.  Under fluoroscopic guidance, I advanced a 22 gauge 3.5 inch BD spinal needle into the epidural space via left transforaminal approaches.  Positioning was confirmed at each location with administration of contrast.  I then instilled divided between the two needles a combination of 160 mg Depo-Medrol and 1 ml of 5% dextrose in water.  I removed the needles and applied a sterile dressing.  The patient tolerated the procedure well without apparent complication.    IMPRESSION:  Successful fluoroscopically guided transforaminal lumbar epidural injections left L3-4 and left L4-5.        ______________________________  Alex Campbell MD    DP/LEROY  DD:  02/06/2018  Time:  10:18AM  DT:  02/06/2018  Time:  03:39PM  Job #:  [id number]

## 2022-04-30 NOTE — OP NOTE
Patient:   Rene Baer            MRN: [id number]            FIN: [id number]               Age:   69 years     Sex:  Male     :  1950   Associated Diagnoses:   None   Author:   Alex Campbell MD      DATE OF SURGERY:    2020    SURGEON:  Alex Campbell MD    PREOPERATIVE DIAGNOSIS:  Lumbar radiculopathy.    POSTOPERATIVE DIAGNOSIS:  Lumbar radiculopathy.    PROCEDURE PERFORMED:  Fluoroscopically-guided transforaminal lumbar epidural injections at right L3-4, L4-5.    EQUIPMENT USED:  Epidural tray.    DESCRIPTION OF PROCEDURE:  Following informed consent, the patient was prepped and draped in the usual sterile fashion.  I infiltrated the tissue overlying L3-4, L4-5 with local anesthetic.  Under fluoroscopic guidance, I advanced a 22-gauge 3.5 inch BD spinal needle into the epidural space via right transforaminal approaches.  Positioning was confirmed at each location with administration of contrast.  I then instilled divided between the 2 needles a combination of 160 mg Depo-Medrol and 1 mL of 5% dextrose in water.  I removed the needles and applied sterile dressings.  The patient tolerated the procedure well without apparent complication.    IMPRESSION:  Successful fluoroscopically-guided transforaminal lumbar epidural injection at right L3-4, L4-5.

## 2022-04-30 NOTE — OP NOTE
Patient:   Rene Baer            MRN: [id number]            FIN: [id number]               Age:   70 years     Sex:  Male     :  1950   Associated Diagnoses:   None   Author:   Alex Campbell MD      DATE OF SURGERY:  2021       SURGEON:  Alex Campbell MD    PREOPERATIVE DIAGNOSIS:  Cervical radiculopathy.    POSTOPERATIVE DIAGNOSE:  Cervical radiculopathy.    PROCEDURE PERFORMED:  Fluoroscopically-guided intralaminar cervical epidural injection at C3-4.    EQUIPMENT USED:  Epidural tray.    DETAILED DESCRIPTION:  Following informed consent, the patient was prepped and draped in the usual sterile fashion.  I infiltrated the tissue overlying C3-4 with local anesthetic.  Under fluoroscopic guidance, I advanced a 25-gauge 3.5 inch BD spinal needle into the epidural space.  Positioning was confirmed with administration of contrast.  I then instilled a combination of 160 mg Depo-Medrol and 1 mL of 5% dextrose in water.  I removed the needle and applied a sterile dressing.  The patient tolerated the procedure well without apparent complication.    IMPRESSION:  Successful fluoroscopically-guided intralaminar cervical epidural injection at C3-4.

## 2022-04-30 NOTE — OP NOTE
DATE OF SURGERY:    09/28/2017    SURGEON:  Alex Campbell MD    PREOPERATIVE DIAGNOSIS:  Lumbar degenerative disk disease, lumbar radiculopathy.    POSTOPERATIVE DIAGNOSE:  Lumbar degenerative disk disease, lumbar radiculopathy.    PROCEDURE PERFORMED:  Fluoroscopically guided transforaminal lumbar epidural injections at left L3-4 and left L4-5.    EQUIPMENT USED:  Epidural tray.    PROCEDURE IN DETAIL:  Following informed consent, the patient was prepped and draped in the usual sterile fashion.  I infiltrated the tissue overlying L3-4 and L4-5 with local anesthetic.  Under fluoroscopic guidance, I advanced a 22-gauge, 3.5 inch BD spinal needle in the epidural space via left transforaminal approaches.  Positioning was confirmed with administration of contrast.  I then instilled, divided between the 2 needles, a combination of 160 mg Depo-Medrol and 1 mL of 5% dextrose in water.  I removed the needles and applied sterile dressing.  The patient tolerated the procedure well without apparent complication.    IMPRESSION:  Successful fluoroscopically-guided transforaminal lumbar epidural injections at left L3-4 and left L4-5.        ______________________________  Alex Campbell MD    DP/UH  DD:  09/28/2017  Time:  10:27AM  DT:  09/29/2017  Time:  10:29AM  Job #:  [id number]

## 2022-04-30 NOTE — OP NOTE
Patient:   Rene Baer            MRN: [id number]            FIN: [id number]               Age:   70 years     Sex:  Male     :  1950   Associated Diagnoses:   None   Author:   Alex Campbell MD      DATE OF SURGERY:  2021       SURGEON:  Alex Campbell MD    PREOPERATIVE DIAGNOSIS:  Cervical radiculopathy.    POSTOPERATIVE DIAGNOSE:  Cervical radiculopathy.    PROCEDURE PERFORMED:  Fluoroscopically-guided intralaminar cervical epidural injection at C3-4.    EQUIPMENT USED:  Epidural tray.    DETAILED DESCRIPTION:  Following informed consent, the patient was prepped and draped in the usual sterile fashion.  I infiltrated the tissue overlying C3-4 with local anesthetic.  Under fluoroscopic guidance, I advanced a 25-gauge 3.5 inch BD spinal needle into the epidural space.  Positioning was confirmed with administration of contrast.  I then instilled a combination of 160 mg Depo-Medrol and 1 mL of 5% dextrose in water.  I removed the needle and applied a sterile dressing.  The patient tolerated the procedure well without apparent complication.    IMPRESSION:  Successful fluoroscopically-guided intralaminar cervical epidural injection at C3-4.

## 2022-04-30 NOTE — OP NOTE
Patient:   Rene Baer            MRN: [id number]            FIN: [id number]               Age:   70 years     Sex:  Male     :  1950   Associated Diagnoses:   None   Author:   Alex Campbell MD      DATE OF SURGERY:    2021    SURGEON:  Alex Campbell MD    PREOPERATIVE DIAGNOSIS:  Lumbar radiculopathy.    POSTOPERATIVE DIAGNOSIS:  Lumbar radiculopathy.    PROCEDURE PERFORMED:  Fluoroscopically-guided transforaminal lumbar epidural injections at right L3-4, L4-5.    EQUIPMENT USED:  Epidural tray.    DESCRIPTION OF PROCEDURE:  Following informed consent, the patient was prepped and draped in the usual sterile fashion.  I infiltrated the tissue overlying L3-4, L4-5 with local anesthetic.  Under fluoroscopic guidance, I advanced a 22-gauge 3.5 inch BD spinal needle into the epidural space via right transforaminal approaches.  Positioning was confirmed at each location with administration of contrast.  I then instilled divided between the 2 needles a combination of 160 mg Depo-Medrol and 1 mL of 5% dextrose in water.  I removed the needles and applied sterile dressings.  The patient tolerated the procedure well without apparent complication.    IMPRESSION:  Successful fluoroscopically-guided transforaminal lumbar epidural injection at right L3-4, L4-5.

## 2022-04-30 NOTE — OP NOTE
Patient:   Rene Baer            MRN: [id number]            FIN: [id number]               Age:   69 years     Sex:  Male     :  1950   Associated Diagnoses:   None   Author:   Alex Campbell MD      DATE OF SURGERY:  20        SURGEON:  Alex Campbell MD    PREOPERATIVE DIAGNOSIS:  Cervical radiculopathy.    POSTOPERATIVE DIAGNOSE:  Cervical radiculopathy.    PROCEDURE PERFORMED:  Fluoroscopically-guided intralaminar cervical epidural injection at C4-5.    EQUIPMENT USED:  Epidural tray.    DETAILED DESCRIPTION:  Following informed consent, the patient was prepped and draped in the usual sterile fashion.  I infiltrated the tissue overlying C4-5with local anesthetic.  Under fluoroscopic guidance, I advanced a 25-gauge 3.5 inch BD spinal needle into the epidural space.  Positioning was confirmed with administration of contrast.  I then instilled a combination of 160 mg Depo-Medrol and 1 mL of 5% dextrose in water.  I removed the needle and applied a sterile dressing.  The patient tolerated the procedure well without apparent complication.    IMPRESSION:  Successful fluoroscopically-guided intralaminar cervical epidural injection at C4-5.

## 2022-05-02 PROBLEM — G56.03 BILATERAL CARPAL TUNNEL SYNDROME: Status: ACTIVE | Noted: 2022-05-02

## 2022-05-02 PROBLEM — G89.4 CHRONIC PAIN SYNDROME: Status: ACTIVE | Noted: 2022-05-02

## 2022-05-20 RX ORDER — HYDROCODONE BITARTRATE AND ACETAMINOPHEN 10; 325 MG/1; MG/1
1 TABLET ORAL EVERY 6 HOURS PRN
Qty: 120 TABLET | Refills: 0 | Status: SHIPPED | OUTPATIENT
Start: 2022-05-20 | End: 2022-07-19 | Stop reason: SDUPTHER

## 2022-05-23 RX ORDER — TIZANIDINE 4 MG/1
1 TABLET ORAL EVERY 8 HOURS PRN
COMMUNITY
Start: 2022-04-16 | End: 2022-05-23 | Stop reason: SDUPTHER

## 2022-05-23 RX ORDER — TIZANIDINE 4 MG/1
4 TABLET ORAL EVERY 8 HOURS PRN
Qty: 90 TABLET | Refills: 0 | Status: SHIPPED | OUTPATIENT
Start: 2022-05-23 | End: 2022-06-23 | Stop reason: SDUPTHER

## 2022-05-26 NOTE — PROGRESS NOTES
Subjective:      Patient ID: Rene Baer is a 71 y.o. male.    Chief Complaint: Pain (3mo f/u for pain. Pain in neck, R leg, lumbar. Aching pain. Pain level 7. Refill needed for Tamsulosin.)    Referred by: No ref. provider found     HPI    Interventional Pain History  Pt new to me. LOV with Dr Campbell on 02/07/2022 w/PLAN of consider SCS trial + CTS bilateral braces. He has not been able to purchase CTS braces as company did not have them in stock. He prefers having the braces sent to [facility] in [place], [place]. He has pertinent PSH of cervical spine fusion 20-25 years ago.  Patient is currently not interested in a spinal cord stimulator or returning to PT at this time. He last completed PT for his neck and low back several years ago. He continues home PT exercises.     Pain in neck has shocking sensation with neck turning and limited ROM with moving side to side. He also has aching pain and limited ROM s/p cervical spine fusion. Notes intermittent  jerking pain to right arm for couple of years. Denies spinal cord injury. Urinary retentions s/p prostate cancer w/radiation treatments completed.     Pain to bilateral low back feels like a heavy weight/bone on bone sensation. He also has right leg weakness noted as weakness to right groin will alternate to left groin. Ortho has not evaluated. He has not had a recent MRI L Spine     Pain Medications:   Norco 10/325 QID #120 (TID to QID)  Last filled on 05/27/2022  No diversion per [place]  Outside Provider/MD fills Gabapentin  Tizanidine 4mg takes 3-4 times a week  (no refills needed)  Mobic 15 mg by mouth + Diclofenac (alternates does not take at same time)        ROS  As noted in HPI      Objective:          Physical Exam   Gen NAD  EOMI, PERRLA, VFF  HEENT limited ROM w/lateral neck turning and flex/ext. Strong bilateral hand  w/out numbness to arms or back of neck/trapezius. Negative cervical paraspinal banding.   CV RRR,  Resp clear with even chest  expansion  Neuro  AAOx4  Speech fluent, appropriate  Motor 5/5 bilateral UE and LE  Sensation intact, TTP C and L-spine;   DTRs brisk bilateral patella, negative clonus, negative gamez  SLR on RIGHT stimulated + groin pain bilaterally, as did bilateral external rotation.   SLR LEFT normal  Coord intact heel to shin  Gait antalgic. Egress from chair and external rotation resulted in sharp pain to hip/groin region.           Assessment:       Encounter Diagnoses   Name Primary?    Chronic pain disorder Yes    Lumbar spondylosis     Cervical spondylosis without myelopathy     Carpal tunnel syndrome, unspecified laterality     Bilateral carpal tunnel syndrome     Cervical vertebral fusion syndrome          Plan:       Rene was seen today for pain.    Diagnoses and all orders for this visit:    Chronic pain disorder    Lumbar spondylosis    Cervical spondylosis without myelopathy    Carpal tunnel syndrome, unspecified laterality  -     HME - OTHER    Bilateral carpal tunnel syndrome    Cervical vertebral fusion syndrome       Pain Medications:   Norco 10/325 QID #120  Last filled on 05/27/2022  No diversion per [place]  Outside Provider/MD fills Gabapentin    Plan was to order L Spine MRI as patient is not intersested in SCS trial at this time. Also recommended PT repeat for bilateral groin/hip pain. States PT completed ears ago and he can not afford at this time. He will follow up with his PCP or Ortho to evaluate if his pain to bilateral hips with external rotation is due to his hips. If not, will need to further confirm if originating from L1/L2 nerve root.     Patient to continue pain meds as below and follow up with Dr. Campbell   in 3 months to discuss SCS trial and possible PT. Pt to call back sooner if he changes his mind and would like to proceed with a SCS trial. Bilateral wrist splints DME sent to [facility] in [facility].     Pain Medications:   Norco 10/325 QID #120 (TID to QID)  Last  filled on 05/27/2022  No diversion per [place]  Outside Provider/MD fills Gabapentin  Tizanidine 4mg takes 3-4 times a week  (no refills needed)  Mobic 15 mg by mouth + Diclofenac (alternates does not take at same time)    Relayed to patient Administrative changes with [facility] will forward on interventional pain procedures only and no longer prescribe opioids for chronic pain. Patient was asked to follow up with PCP to request a referral to an alternate Pain Medication MD to manage chronic opioids for pain treatment. He verbalized understanding

## 2022-05-30 ENCOUNTER — OFFICE VISIT (OUTPATIENT)
Dept: NEUROLOGY | Facility: CLINIC | Age: 72
End: 2022-05-30
Payer: MEDICARE

## 2022-05-30 VITALS
SYSTOLIC BLOOD PRESSURE: 130 MMHG | BODY MASS INDEX: 30.2 KG/M2 | HEIGHT: 72 IN | WEIGHT: 223 LBS | TEMPERATURE: 97 F | DIASTOLIC BLOOD PRESSURE: 70 MMHG

## 2022-05-30 DIAGNOSIS — Q76.1 CERVICAL VERTEBRAL FUSION SYNDROME: ICD-10-CM

## 2022-05-30 DIAGNOSIS — G89.4 CHRONIC PAIN DISORDER: Primary | ICD-10-CM

## 2022-05-30 DIAGNOSIS — M47.816 LUMBAR SPONDYLOSIS: ICD-10-CM

## 2022-05-30 DIAGNOSIS — G56.00 CARPAL TUNNEL SYNDROME, UNSPECIFIED LATERALITY: ICD-10-CM

## 2022-05-30 DIAGNOSIS — M47.812 CERVICAL SPONDYLOSIS WITHOUT MYELOPATHY: ICD-10-CM

## 2022-05-30 DIAGNOSIS — G56.03 BILATERAL CARPAL TUNNEL SYNDROME: ICD-10-CM

## 2022-05-30 PROCEDURE — 99215 PR OFFICE/OUTPT VISIT, EST, LEVL V, 40-54 MIN: ICD-10-PCS | Mod: S$PBB,,, | Performed by: NURSE PRACTITIONER

## 2022-05-30 PROCEDURE — 99999 PR PBB SHADOW E&M-EST. PATIENT-LVL IV: CPT | Mod: PBBFAC,,, | Performed by: NURSE PRACTITIONER

## 2022-05-30 PROCEDURE — 99215 OFFICE O/P EST HI 40 MIN: CPT | Mod: S$PBB,,, | Performed by: NURSE PRACTITIONER

## 2022-05-30 PROCEDURE — 99999 PR PBB SHADOW E&M-EST. PATIENT-LVL IV: ICD-10-PCS | Mod: PBBFAC,,, | Performed by: NURSE PRACTITIONER

## 2022-05-30 PROCEDURE — 99214 OFFICE O/P EST MOD 30 MIN: CPT | Mod: PBBFAC | Performed by: NURSE PRACTITIONER

## 2022-05-30 RX ORDER — FERROUS GLUCONATE 324(38)MG
1 TABLET ORAL
COMMUNITY
Start: 2022-03-29

## 2022-05-30 NOTE — PATIENT INSTRUCTIONS
Please follow up with your regular MD to have your hip pain evaluated with him/her or a referral to Ortho, since we can't order a MRI of your L spine until you complete PT.     Also ask your regular MD to recommend alternate Pain Specialists who focus on medication management to take over prescribing your pain meds within the next 30 days as [facility] policies for our [facility] no longer allow Dr Campbell to precribe your [place].

## 2022-06-07 DIAGNOSIS — M17.11 OSTEOARTHRITIS OF RIGHT KNEE: Primary | ICD-10-CM

## 2022-06-23 ENCOUNTER — HOSPITAL ENCOUNTER (OUTPATIENT)
Dept: RADIOLOGY | Facility: CLINIC | Age: 72
Discharge: HOME | End: 2022-06-23
Attending: ORTHOPAEDIC SURGERY
Payer: MEDICARE

## 2022-06-23 ENCOUNTER — OFFICE VISIT (OUTPATIENT)
Dept: ORTHOPEDICS | Facility: CLINIC | Age: 72
End: 2022-06-23
Payer: MEDICARE

## 2022-06-23 VITALS
BODY MASS INDEX: 30.2 KG/M2 | WEIGHT: 223 LBS | HEIGHT: 72 IN | SYSTOLIC BLOOD PRESSURE: 130 MMHG | DIASTOLIC BLOOD PRESSURE: 70 MMHG | HEART RATE: 86 BPM

## 2022-06-23 DIAGNOSIS — M16.11 PRIMARY OSTEOARTHRITIS OF RIGHT HIP: ICD-10-CM

## 2022-06-23 DIAGNOSIS — M47.812 CERVICAL SPONDYLOSIS WITHOUT MYELOPATHY: Primary | ICD-10-CM

## 2022-06-23 DIAGNOSIS — M47.816 LUMBAR SPONDYLOSIS: ICD-10-CM

## 2022-06-23 DIAGNOSIS — M17.11 OSTEOARTHRITIS OF RIGHT KNEE: Primary | ICD-10-CM

## 2022-06-23 DIAGNOSIS — M17.11 OSTEOARTHRITIS OF RIGHT KNEE: ICD-10-CM

## 2022-06-23 DIAGNOSIS — R52 PAIN MANAGEMENT: ICD-10-CM

## 2022-06-23 PROCEDURE — 73564 XR KNEE COMP 4 OR MORE VIEWS RIGHT: ICD-10-PCS | Mod: RT,,, | Performed by: ORTHOPAEDIC SURGERY

## 2022-06-23 PROCEDURE — 73502 XR HIP WITH PELVIS WHEN PERFORMED, 2 OR 3  VIEWS RIGHT: ICD-10-PCS | Mod: RT,,, | Performed by: ORTHOPAEDIC SURGERY

## 2022-06-23 PROCEDURE — 99213 PR OFFICE/OUTPT VISIT, EST, LEVL III, 20-29 MIN: ICD-10-PCS | Mod: ,,, | Performed by: ORTHOPAEDIC SURGERY

## 2022-06-23 PROCEDURE — 73564 X-RAY EXAM KNEE 4 OR MORE: CPT | Mod: RT,,, | Performed by: ORTHOPAEDIC SURGERY

## 2022-06-23 PROCEDURE — 73502 X-RAY EXAM HIP UNI 2-3 VIEWS: CPT | Mod: RT,,, | Performed by: ORTHOPAEDIC SURGERY

## 2022-06-23 PROCEDURE — 99213 OFFICE O/P EST LOW 20 MIN: CPT | Mod: ,,, | Performed by: ORTHOPAEDIC SURGERY

## 2022-06-23 RX ORDER — TIZANIDINE 4 MG/1
4 TABLET ORAL EVERY 8 HOURS PRN
Qty: 90 TABLET | Refills: 1 | Status: SHIPPED | OUTPATIENT
Start: 2022-06-23 | End: 2022-09-07 | Stop reason: SDUPTHER

## 2022-06-23 NOTE — PROGRESS NOTES
Chief Complaint:   Chief Complaint   Patient presents with    Right Knee - Pain    Pain     Right knee pain, patient states he didnt have an injury and thinks it might be from his hip     Consulting Physician: Teofilo Eden MD      History of present illness:    This is a 71 y.o. year old male presenting with complaints of right knee pain for several years.  This knee pain is moderate to severe. Patient states pain is worse with ambulation and activity, especially squats and lunges.  Patient states pain is global.  Patient has been treated previously with prescribed diclofenac and meloxicam.  He also complains of right hip pain for several years.  He reports pain in the right groin that worsens with ambulation and activity.  He states that he feels as if his knee pain is stemming from his hip pain.  Pain in the right hip is more notable than the right knee.  He also has a history of chronic neck and back pain followed by pain management.      Past Medical History:   Diagnosis Date    Adjustment disorder     Bilateral carpal tunnel syndrome     CAD (coronary artery disease)     Cervical radiculopathy     Chronic headaches     Chronic pain syndrome     Gout     HTN (hypertension)     Lumbar radiculopathy     Migraine     Osteoarthritis     Prostate cancer        Past Surgical History:   Procedure Laterality Date    APPENDECTOMY      CERVICAL FUSION      EPIDURAL STEROID INJECTION INTO CERVICAL SPINE      EPIDURAL STEROID INJECTION INTO LUMBAR SPINE      Fluoroscopy of spinal cord      INSERTION OF PACEMAKER      INSERTION OF PERMANENT PACEMAKER      INTRAOPERATIVE RADIATION THERAPY      LUNG REMOVAL, PARTIAL      Myelogram      Neck fusion      PENILE PROSTHESIS IMPLANT         Current Outpatient Medications   Medication Sig    aspirin (ECOTRIN) 81 MG EC tablet Take 81 mg by mouth.    atorvastatin (LIPITOR) 40 MG tablet Take 40 mg by mouth.    bicalutamide (CASODEX) 50 MG Tab Take 50 mg by  mouth.    cetirizine (ZYRTEC) 10 MG tablet Take 10 mg by mouth.    clarithromycin (BIAXIN) 500 MG tablet     COLCRYS 0.6 mg tablet     diclofenac (VOLTAREN) 75 MG EC tablet     ferrous gluconate (FERGON) 324 MG tablet Take 1 tablet by mouth 2 (two) times daily.    fluticasone propionate (FLONASE) 50 mcg/actuation nasal spray     HYDROcodone-acetaminophen (NORCO)  mg per tablet Take 1 tablet by mouth every 6 (six) hours as needed for Pain.    isosorbide mononitrate (IMDUR) 30 MG 24 hr tablet     leuprolide acetate (LUPRON DEPOT IM) Inject into the muscle.    lisinopril (PRINIVIL,ZESTRIL) 20 MG tablet     meloxicam (MOBIC) 15 MG tablet     multivitamin capsule Take 1 capsule by mouth once daily.    nitroGLYCERIN (NITROSTAT) 0.4 MG SL tablet     pantoprazole (PROTONIX) 40 MG tablet     potassium bicarbonate disintegrating tablet Take by mouth 2 (two) times daily.    topiramate (TOPAMAX) 25 MG tablet     tiZANidine (ZANAFLEX) 4 MG tablet Take 1 tablet (4 mg total) by mouth every 8 (eight) hours as needed (Muscpe spasms).     No current facility-administered medications for this visit.       Review of patient's allergies indicates:   Allergen Reactions    Fentanyl     Proparacaine     Tropicamide      Other reaction(s): disoriented       Family History   Problem Relation Age of Onset    Heart disease Mother     Hypertension Mother     Heart disease Sister     Hypertension Sister     Heart disease Brother     Hypertension Brother        Social History     Socioeconomic History    Marital status:    Tobacco Use    Smoking status: Never Smoker    Smokeless tobacco: Never Used   Substance and Sexual Activity    Alcohol use: Yes     Comment: RARE    Drug use: Never           Review of Systems:  All review of systems negative except for those stated in the HPI.    Examination:    Vital Signs:    Vitals:    06/23/22 0836   BP: 130/70   Pulse: 86   Weight: 101.2 kg (223 lb)   Height:  6' (1.829 m)       Body mass index is 30.24 kg/m².    Physical Exam:   General: Well-developed, well-nourished.  Neuro: Alert and oriented x 3.  Psych: Normal mood and affect.  Right Knee Exam:  Varus deformity. Range of motion from 5-110 degrees. Negative patella grind and equal subluxation of knee cap medial and lateral < 1cm. Negative patella tendon tenderness. Negative Lachman and anterior drawer test. Negative posterior drawer test. Negative varus and valgus stress test. Positive medial joint line tenderness. Negative lateral joint line tenderness. 4/5 strength and normal skin appearance. Sensibility normal.  Right Hip Exam:  No obvious deformity. Flexion to 120 degrees and internal and external rotation to 40 degrees. Abduction to 45 degree and adduction to 30 degrees.  Positive MARC test. Negative FADDIR test. No flexion contracture. Negative greater trochanteric tenderness. Negative MONET test. 4/5 strength, normal skin appearance and palpable pulses distally. Sensibility normal.      Imaging: X-rays ordered and images interpreted today personally by me of 4 views of the right knee demonstrate knee joint space narrowing and degenerative changes with a varus deformity. There is also subchondral sclerosis and osteophyte formation.  X-rays ordered and images interpreted today personally by me of 3 views of the right hip demonstrate degenerative changes with joint space narrowing.      Assessment: Osteoarthritis of right knee  -     Ambulatory referral/consult to Orthopedics  -     X-Ray Knee Complete 4 Or More Views Right; Future; Expected date: 06/23/2022  -     X-Ray Hip 2 or 3 views Right (with Pelvis when performed); Future; Expected date: 06/23/2022    Primary osteoarthritis of right hip    Pain management        Plan:  X-rays were reviewed with the patient.  In regards to his right knee, we discussed multiple options, including continued observation, medications, steroid injections, visco-supplementation  injections, bracing, and physical therapy.  He would like to defer further treatments for now.  Patient would like to pursue further treatment of his right hip due to increased symptoms severity.  I will refer him to one of my partners Tono Farah MD for further evaluation recommendations regarding his right hip.  He will return to clinic as needed for any additional issues or concerns.  He verbalized understanding plan of care with no further questions.    Followed by pain management specialist and currently prescribed Norco 10 mg/325 mg every 6 hours.         Follow up if symptoms worsen or fail to improve.      DISCLAIMER: This note may have been dictated using voice recognition software and may contain grammatical errors.     NOTE: Consult report sent to referring provider via [employer] EMR.

## 2022-07-19 ENCOUNTER — OFFICE VISIT (OUTPATIENT)
Dept: ORTHOPEDICS | Facility: CLINIC | Age: 72
End: 2022-07-19
Payer: MEDICARE

## 2022-07-19 VITALS
HEART RATE: 76 BPM | SYSTOLIC BLOOD PRESSURE: 123 MMHG | WEIGHT: 223.63 LBS | BODY MASS INDEX: 30.29 KG/M2 | DIASTOLIC BLOOD PRESSURE: 77 MMHG | TEMPERATURE: 98 F | HEIGHT: 72 IN

## 2022-07-19 DIAGNOSIS — M54.16 LUMBAR RADICULAR PAIN: ICD-10-CM

## 2022-07-19 DIAGNOSIS — G89.4 CHRONIC PAIN SYNDROME: Primary | ICD-10-CM

## 2022-07-19 DIAGNOSIS — M16.11 PRIMARY OSTEOARTHRITIS OF RIGHT HIP: Primary | ICD-10-CM

## 2022-07-19 PROCEDURE — 99213 PR OFFICE/OUTPT VISIT, EST, LEVL III, 20-29 MIN: ICD-10-PCS | Mod: ,,, | Performed by: ORTHOPAEDIC SURGERY

## 2022-07-19 PROCEDURE — 99213 OFFICE O/P EST LOW 20 MIN: CPT | Mod: ,,, | Performed by: ORTHOPAEDIC SURGERY

## 2022-07-19 RX ORDER — METOPROLOL SUCCINATE 25 MG/1
25 TABLET, EXTENDED RELEASE ORAL NIGHTLY
COMMUNITY
Start: 2022-07-02 | End: 2024-01-23

## 2022-07-19 RX ORDER — TAMSULOSIN HYDROCHLORIDE 0.4 MG/1
0.4 CAPSULE ORAL EVERY MORNING
COMMUNITY
Start: 2022-07-08

## 2022-07-19 RX ORDER — FLUTICASONE FUROATE AND VILANTEROL 200; 25 UG/1; UG/1
1 POWDER RESPIRATORY (INHALATION) DAILY PRN
Status: ON HOLD | COMMUNITY
End: 2024-01-31 | Stop reason: CLARIF

## 2022-07-19 RX ORDER — DORZOLAMIDE HYDROCHLORIDE AND TIMOLOL MALEATE 20; 5 MG/ML; MG/ML
1 SOLUTION/ DROPS OPHTHALMIC EVERY 12 HOURS PRN
Status: ON HOLD | COMMUNITY
End: 2024-01-31 | Stop reason: CLARIF

## 2022-07-19 RX ORDER — GABAPENTIN 300 MG/1
CAPSULE ORAL
COMMUNITY
Start: 2022-06-22 | End: 2022-07-31

## 2022-07-19 RX ORDER — SODIUM CHLORIDE 9 MG/ML
INJECTION, SOLUTION INTRAVENOUS CONTINUOUS
Status: CANCELLED | OUTPATIENT
Start: 2022-07-19

## 2022-07-19 RX ORDER — ALLOPURINOL 100 MG/1
100 TABLET ORAL DAILY PRN
COMMUNITY
Start: 2022-07-07 | End: 2023-02-13

## 2022-07-19 RX ORDER — POTASSIUM CITRATE 15 MEQ/1
1 TABLET, EXTENDED RELEASE ORAL 2 TIMES DAILY
COMMUNITY
Start: 2022-07-07

## 2022-07-19 RX ORDER — SERTRALINE HYDROCHLORIDE 50 MG/1
50 TABLET, FILM COATED ORAL NIGHTLY
COMMUNITY
Start: 2022-02-07 | End: 2022-09-06

## 2022-07-19 RX ORDER — ALBUTEROL SULFATE 90 UG/1
2 AEROSOL, METERED RESPIRATORY (INHALATION) EVERY 6 HOURS PRN
COMMUNITY
End: 2024-01-23 | Stop reason: CLARIF

## 2022-07-19 RX ORDER — MIRABEGRON 25 MG/1
25 TABLET, FILM COATED, EXTENDED RELEASE ORAL 2 TIMES DAILY
COMMUNITY
Start: 2022-07-07 | End: 2024-01-23 | Stop reason: CLARIF

## 2022-07-19 RX ORDER — LACTULOSE 10 G/15ML
30 SOLUTION ORAL; RECTAL 2 TIMES DAILY PRN
COMMUNITY
Start: 2022-05-31

## 2022-07-19 RX ORDER — TRIAMCINOLONE ACETONIDE 1 MG/G
1 OINTMENT TOPICAL 2 TIMES DAILY PRN
COMMUNITY
Start: 2022-06-23 | End: 2024-01-23 | Stop reason: CLARIF

## 2022-07-19 NOTE — H&P (VIEW-ONLY)
Past Medical History:   Diagnosis Date    Adjustment disorder     Bilateral carpal tunnel syndrome     CAD (coronary artery disease)     Cervical radiculopathy     Chronic headaches     Chronic pain syndrome     Gout     HTN (hypertension)     Lumbar radiculopathy     Migraine     Osteoarthritis     Prostate cancer        Past Surgical History:   Procedure Laterality Date    APPENDECTOMY      CERVICAL FUSION      EPIDURAL STEROID INJECTION INTO CERVICAL SPINE      EPIDURAL STEROID INJECTION INTO LUMBAR SPINE      Fluoroscopy of spinal cord      INSERTION OF PACEMAKER      INSERTION OF PERMANENT PACEMAKER      INTRAOPERATIVE RADIATION THERAPY      LUNG REMOVAL, PARTIAL      Myelogram      Neck fusion      PENILE PROSTHESIS IMPLANT         Current Outpatient Medications   Medication Sig    albuterol (PROVENTIL/VENTOLIN HFA) 90 mcg/actuation inhaler Inhale 2 puffs into the lungs every 6 (six) hours as needed.    allopurinoL (ZYLOPRIM) 100 MG tablet Take 100 mg by mouth once daily.    aspirin (ECOTRIN) 81 MG EC tablet Take 81 mg by mouth.    atorvastatin (LIPITOR) 40 MG tablet Take 40 mg by mouth.    bicalutamide (CASODEX) 50 MG Tab Take 50 mg by mouth.    cetirizine (ZYRTEC) 10 MG tablet Take 10 mg by mouth.    clarithromycin (BIAXIN) 500 MG tablet     COLCRYS 0.6 mg tablet     diclofenac (VOLTAREN) 75 MG EC tablet     dorzolamide-timolol 2-0.5% (COSOPT) 22.3-6.8 mg/mL ophthalmic solution 1 drop.    ferrous gluconate (FERGON) 324 MG tablet Take 1 tablet by mouth 2 (two) times daily.    fluticasone furoate-vilanteroL (BREO) 200-25 mcg/dose DsDv diskus inhaler Inhale 1 puff into the lungs.    fluticasone propionate (FLONASE) 50 mcg/actuation nasal spray     gabapentin (NEURONTIN) 300 MG capsule Take by mouth.    isosorbide mononitrate (IMDUR) 30 MG 24 hr tablet     lactulose (CHRONULAC) 10 gram/15 mL solution SMARTSIG:Milliliter(s) By Mouth    leuprolide acetate (LUPRON DEPOT IM)  Inject into the muscle.    lisinopril (PRINIVIL,ZESTRIL) 20 MG tablet     meloxicam (MOBIC) 15 MG tablet     metoprolol succinate (TOPROL-XL) 25 MG 24 hr tablet Take 25 mg by mouth once daily.    multivitamin capsule Take 1 capsule by mouth once daily.    MYRBETRIQ 25 mg Tb24 ER tablet Take 25 mg by mouth once daily.    nitroGLYCERIN (NITROSTAT) 0.4 MG SL tablet     pantoprazole (PROTONIX) 40 MG tablet     potassium bicarbonate disintegrating tablet Take by mouth 2 (two) times daily.    potassium citrate (UROCIT-K 15) 15 mEq TbSR Take 1 tablet by mouth 2 (two) times daily.    sertraline (ZOLOFT) 50 MG tablet Take 50 mg by mouth.    tamsulosin (FLOMAX) 0.4 mg Cap Take 1 capsule by mouth once daily.    tiZANidine (ZANAFLEX) 4 MG tablet Take 1 tablet (4 mg total) by mouth every 8 (eight) hours as needed (pain/spasms).    topiramate (TOPAMAX) 25 MG tablet     triamcinolone acetonide 0.1% (KENALOG) 0.1 % ointment Apply 1 application topically 2 (two) times daily.    HYDROcodone-acetaminophen (NORCO)  mg per tablet Take 1 tablet by mouth every 6 (six) hours as needed for Pain. (Patient not taking: Reported on 7/19/2022)     No current facility-administered medications for this visit.       Review of patient's allergies indicates:   Allergen Reactions    Fentanyl     Proparacaine     Tropicamide      Other reaction(s): disoriented       Family History   Problem Relation Age of Onset    Heart disease Mother     Hypertension Mother     Heart disease Sister     Hypertension Sister     Heart disease Brother     Hypertension Brother        Social History     Socioeconomic History    Marital status:    Tobacco Use    Smoking status: Never Smoker    Smokeless tobacco: Never Used   Substance and Sexual Activity    Alcohol use: Yes     Comment: RARE    Drug use: Never       Chief Complaint:   Chief Complaint   Patient presents with    Hip Pain     Referral from Dr Eden for Right Hip pain.  Reports having intermitten pain in right hip at this time. States having aching pain that radiates down his leg. States leg gives out at times. Denies trying injections or therapy in past.       History of present illness: Rene Baer is a 71 y.o. male, presents to the clinic today in regards to right hip pain.  He was referred by Dr. Eden.  Patient states this pain has been ongoing for quite some time now.  Was treated by pain management in the past with Norco.  Pain management discontinued his Norco about a month ago.  Pain has significantly gotten worse.  Pain is located in the anterior groin.  He also has significant pain in the right buttock that radiates all the way down the leg.  Both of these are provoked by prolonged standing.  Hip pain is also increased with getting up from a seated position and putting on socks and shoes.  He denies any injury to the hip.  He does receive lumbar and cervical injections by Dr. Campbell.  His last injection was several months ago.      Review of Systems:    Denies fevers, chills, chest pain, shortness of breath. Comprehensive review of systems performed and otherwise negative except as noted in HPI     Physical Examination:    General: awake and alert, no acute distress, healthy appearing  Head and Neck: Head atraumatic/normocephalic. Moist MM  CV: brisk cap refill  Lungs: non-labored breathing, w/o cough or SOB  Skin: no rashes present, warm to touch  Neuro: sensation grossly intact distally       Vital Signs:    Vitals:    07/19/22 1441   BP: 123/77   Pulse: 76   Temp: 97.6 °F (36.4 °C)       Body mass index is 30.33 kg/m².    Focused Orthopedic Exam:    right hip without wound or skin breakdown.  mild tenderness to palpation about the greater trochanter and gluteus  Flexion to 100. Internal Rotation to 20. External Rotation to 30. Anterior groin and lateral hip pain with range of motion  + Stinchfield  + antalgic gait  + Straight leg raise          Assessment::Primary OA R hip & lumbar radiculopathy    Plan:  Upon examination today here in clinic patient does seem to have anterior groin pain from osteoarthritis in the right hip, and also radicular pain coming from his lower lumbar spine.  He has tried several injections with Dr. Campbell in which helped his pain somewhat.  He is currently not being treated with Norco at this time.  We discussed his different options in regard to his hip pain.  He would like to schedule intra-articular injection into the right hip for treatment and diagnostic intervention.  Will schedule this for next week.  Have him return to clinic once injection is done to evaluate his hip.    This note was created using [employer] voice recognition software that occasionally misinterpreted phrases or words.    Consult note is delivered via Epic messaging service.

## 2022-07-19 NOTE — PROGRESS NOTES
Past Medical History:   Diagnosis Date    Adjustment disorder     Bilateral carpal tunnel syndrome     CAD (coronary artery disease)     Cervical radiculopathy     Chronic headaches     Chronic pain syndrome     Gout     HTN (hypertension)     Lumbar radiculopathy     Migraine     Osteoarthritis     Prostate cancer        Past Surgical History:   Procedure Laterality Date    APPENDECTOMY      CERVICAL FUSION      EPIDURAL STEROID INJECTION INTO CERVICAL SPINE      EPIDURAL STEROID INJECTION INTO LUMBAR SPINE      Fluoroscopy of spinal cord      INSERTION OF PACEMAKER      INSERTION OF PERMANENT PACEMAKER      INTRAOPERATIVE RADIATION THERAPY      LUNG REMOVAL, PARTIAL      Myelogram      Neck fusion      PENILE PROSTHESIS IMPLANT         Current Outpatient Medications   Medication Sig    albuterol (PROVENTIL/VENTOLIN HFA) 90 mcg/actuation inhaler Inhale 2 puffs into the lungs every 6 (six) hours as needed.    allopurinoL (ZYLOPRIM) 100 MG tablet Take 100 mg by mouth once daily.    aspirin (ECOTRIN) 81 MG EC tablet Take 81 mg by mouth.    atorvastatin (LIPITOR) 40 MG tablet Take 40 mg by mouth.    bicalutamide (CASODEX) 50 MG Tab Take 50 mg by mouth.    cetirizine (ZYRTEC) 10 MG tablet Take 10 mg by mouth.    clarithromycin (BIAXIN) 500 MG tablet     COLCRYS 0.6 mg tablet     diclofenac (VOLTAREN) 75 MG EC tablet     dorzolamide-timolol 2-0.5% (COSOPT) 22.3-6.8 mg/mL ophthalmic solution 1 drop.    ferrous gluconate (FERGON) 324 MG tablet Take 1 tablet by mouth 2 (two) times daily.    fluticasone furoate-vilanteroL (BREO) 200-25 mcg/dose DsDv diskus inhaler Inhale 1 puff into the lungs.    fluticasone propionate (FLONASE) 50 mcg/actuation nasal spray     gabapentin (NEURONTIN) 300 MG capsule Take by mouth.    isosorbide mononitrate (IMDUR) 30 MG 24 hr tablet     lactulose (CHRONULAC) 10 gram/15 mL solution SMARTSIG:Milliliter(s) By Mouth    leuprolide acetate (LUPRON DEPOT IM)  Inject into the muscle.    lisinopril (PRINIVIL,ZESTRIL) 20 MG tablet     meloxicam (MOBIC) 15 MG tablet     metoprolol succinate (TOPROL-XL) 25 MG 24 hr tablet Take 25 mg by mouth once daily.    multivitamin capsule Take 1 capsule by mouth once daily.    MYRBETRIQ 25 mg Tb24 ER tablet Take 25 mg by mouth once daily.    nitroGLYCERIN (NITROSTAT) 0.4 MG SL tablet     pantoprazole (PROTONIX) 40 MG tablet     potassium bicarbonate disintegrating tablet Take by mouth 2 (two) times daily.    potassium citrate (UROCIT-K 15) 15 mEq TbSR Take 1 tablet by mouth 2 (two) times daily.    sertraline (ZOLOFT) 50 MG tablet Take 50 mg by mouth.    tamsulosin (FLOMAX) 0.4 mg Cap Take 1 capsule by mouth once daily.    tiZANidine (ZANAFLEX) 4 MG tablet Take 1 tablet (4 mg total) by mouth every 8 (eight) hours as needed (pain/spasms).    topiramate (TOPAMAX) 25 MG tablet     triamcinolone acetonide 0.1% (KENALOG) 0.1 % ointment Apply 1 application topically 2 (two) times daily.    HYDROcodone-acetaminophen (NORCO)  mg per tablet Take 1 tablet by mouth every 6 (six) hours as needed for Pain. (Patient not taking: Reported on 7/19/2022)     No current facility-administered medications for this visit.       Review of patient's allergies indicates:   Allergen Reactions    Fentanyl     Proparacaine     Tropicamide      Other reaction(s): disoriented       Family History   Problem Relation Age of Onset    Heart disease Mother     Hypertension Mother     Heart disease Sister     Hypertension Sister     Heart disease Brother     Hypertension Brother        Social History     Socioeconomic History    Marital status:    Tobacco Use    Smoking status: Never Smoker    Smokeless tobacco: Never Used   Substance and Sexual Activity    Alcohol use: Yes     Comment: RARE    Drug use: Never       Chief Complaint:   Chief Complaint   Patient presents with    Hip Pain     Referral from Dr Eden for Right Hip pain.  Reports having intermitten pain in right hip at this time. States having aching pain that radiates down his leg. States leg gives out at times. Denies trying injections or therapy in past.       History of present illness: Rene Baer is a 71 y.o. male, presents to the clinic today in regards to right hip pain.  He was referred by Dr. Eden.  Patient states this pain has been ongoing for quite some time now.  Was treated by pain management in the past with Norco.  Pain management discontinued his Norco about a month ago.  Pain has significantly gotten worse.  Pain is located in the anterior groin.  He also has significant pain in the right buttock that radiates all the way down the leg.  Both of these are provoked by prolonged standing.  Hip pain is also increased with getting up from a seated position and putting on socks and shoes.  He denies any injury to the hip.  He does receive lumbar and cervical injections by Dr. Campbell.  His last injection was several months ago.      Review of Systems:    Denies fevers, chills, chest pain, shortness of breath. Comprehensive review of systems performed and otherwise negative except as noted in HPI     Physical Examination:    General: awake and alert, no acute distress, healthy appearing  Head and Neck: Head atraumatic/normocephalic. Moist MM  CV: brisk cap refill  Lungs: non-labored breathing, w/o cough or SOB  Skin: no rashes present, warm to touch  Neuro: sensation grossly intact distally       Vital Signs:    Vitals:    07/19/22 1441   BP: 123/77   Pulse: 76   Temp: 97.6 °F (36.4 °C)       Body mass index is 30.33 kg/m².    Focused Orthopedic Exam:    right hip without wound or skin breakdown.  mild tenderness to palpation about the greater trochanter and gluteus  Flexion to 100. Internal Rotation to 20. External Rotation to 30. Anterior groin and lateral hip pain with range of motion  + Stinchfield  + antalgic gait  + Straight leg raise          Assessment::Primary OA R hip & lumbar radiculopathy    Plan:  Upon examination today here in clinic patient does seem to have anterior groin pain from osteoarthritis in the right hip, and also radicular pain coming from his lower lumbar spine.  He has tried several injections with Dr. Campbell in which helped his pain somewhat.  He is currently not being treated with Norco at this time.  We discussed his different options in regard to his hip pain.  He would like to schedule intra-articular injection into the right hip for treatment and diagnostic intervention.  Will schedule this for next week.  Have him return to clinic once injection is done to evaluate his hip.    This note was created using [employer] voice recognition software that occasionally misinterpreted phrases or words.    Consult note is delivered via Epic messaging service.

## 2022-07-20 ENCOUNTER — LAB VISIT (OUTPATIENT)
Dept: LAB | Facility: HOSPITAL | Age: 72
End: 2022-07-20
Attending: FAMILY MEDICINE
Payer: MEDICARE

## 2022-07-20 DIAGNOSIS — M10.9 GOUT, UNSPECIFIED CAUSE, UNSPECIFIED CHRONICITY, UNSPECIFIED SITE: Primary | ICD-10-CM

## 2022-07-20 LAB — URATE SERPL-MCNC: 5.6 MG/DL (ref 3.5–7.2)

## 2022-07-20 PROCEDURE — 36415 COLL VENOUS BLD VENIPUNCTURE: CPT

## 2022-07-20 PROCEDURE — 84550 ASSAY OF BLOOD/URIC ACID: CPT

## 2022-07-26 ENCOUNTER — ANESTHESIA EVENT (OUTPATIENT)
Dept: SURGERY | Facility: HOSPITAL | Age: 72
End: 2022-07-26
Payer: MEDICARE

## 2022-07-26 RX ORDER — HYDROCODONE BITARTRATE AND ACETAMINOPHEN 10; 325 MG/1; MG/1
1 TABLET ORAL EVERY 6 HOURS PRN
Qty: 120 TABLET | Refills: 0 | Status: SHIPPED | OUTPATIENT
Start: 2022-07-26 | End: 2022-08-22 | Stop reason: SDUPTHER

## 2022-07-26 NOTE — DISCHARGE INSTRUCTIONS
JOINT INJECTION, CARE AFTER    Refer to this sheet in the next few days. These instructions provide you with information on caring for yourself after you have had a joint injection. Your caregiver also may give you more specific instructions. Call your caregiver if you have any problems or questions after your procedure.     After any type of joint injection it is common to experience:  Soreness, swelling, or bruising around the injection site.  Mild numbness, tingling or weakness around the injection site caused by the numbing medicine used before or with the injection.       It is possible to experience the following effects associated with the specific agent after injection:      Iodine-based contrast agents:   Allergic reaction (itching, hives, widespread redness and swelling beyond the injection site)    Corticosteroids:  Allergic reaction  Increased blood sugar levels  Increased blood pressure  Mood swings  Temporary flushing or redness  Inability to sleep    HOME CARE INSTRUCTIONS  Limit yourself to light activity the day of your procedure. Avoid lifting heavy objects, bending, stooping or twisting  Take prescription or over-the-counter pain medication as directed  You my apply ice to your injection site to reduce pain and swelling the day of your procedure. Ice may be applied for 20 minutes 3-4x/day. Put ice in plastic bag. Place a towel between your skin and the ice.     NO DRIVING FOR 24 HOURS  KEEP BAND-AID CLEAN AND DRY FOR 24 HOURS. OK TO SHOWER TOMORROW. NO TUB BATHS FOR SEVERAL DAYS. CALL DOCTORS OFFICE FOR ANY REDNESS, FEVER OR PUS DRAINAGE FROM INJECTION SITE

## 2022-07-27 ENCOUNTER — ANESTHESIA (OUTPATIENT)
Dept: SURGERY | Facility: HOSPITAL | Age: 72
End: 2022-07-27
Payer: MEDICARE

## 2022-07-27 ENCOUNTER — HOSPITAL ENCOUNTER (OUTPATIENT)
Facility: HOSPITAL | Age: 72
Discharge: HOME | End: 2022-07-27
Attending: ORTHOPAEDIC SURGERY | Admitting: ORTHOPAEDIC SURGERY
Payer: MEDICARE

## 2022-07-27 DIAGNOSIS — M16.11 PRIMARY OSTEOARTHRITIS OF RIGHT HIP: ICD-10-CM

## 2022-07-27 PROCEDURE — 37000008 HC ANESTHESIA 1ST 15 MINUTES: Performed by: ORTHOPAEDIC SURGERY

## 2022-07-27 PROCEDURE — 25000003 PHARM REV CODE 250: Performed by: NURSE ANESTHETIST, CERTIFIED REGISTERED

## 2022-07-27 PROCEDURE — 27095 INJECTION FOR HIP X-RAY: CPT | Mod: RT,,, | Performed by: ORTHOPAEDIC SURGERY

## 2022-07-27 PROCEDURE — 77002 PR FLUOROSCOPIC GUIDANCE NEEDLE PLACEMENT: ICD-10-PCS | Mod: 26,RT,, | Performed by: ORTHOPAEDIC SURGERY

## 2022-07-27 PROCEDURE — 27095 INJECTION FOR HIP X-RAY: CPT | Mod: RT | Performed by: ORTHOPAEDIC SURGERY

## 2022-07-27 PROCEDURE — 37000009 HC ANESTHESIA EA ADD 15 MINS: Performed by: ORTHOPAEDIC SURGERY

## 2022-07-27 PROCEDURE — 27095 PR INJECTION HIP ARTHROGRAM,ANESTH: ICD-10-PCS | Mod: RT,,, | Performed by: ORTHOPAEDIC SURGERY

## 2022-07-27 PROCEDURE — 20610 DRAIN/INJ JOINT/BURSA W/O US: CPT | Performed by: ORTHOPAEDIC SURGERY

## 2022-07-27 PROCEDURE — 77002 NEEDLE LOCALIZATION BY XRAY: CPT | Mod: 26,RT,, | Performed by: ORTHOPAEDIC SURGERY

## 2022-07-27 PROCEDURE — 63600175 PHARM REV CODE 636 W HCPCS: Performed by: NURSE ANESTHETIST, CERTIFIED REGISTERED

## 2022-07-27 RX ORDER — SODIUM CHLORIDE 0.9 % (FLUSH) 0.9 %
3 SYRINGE (ML) INJECTION
Status: DISCONTINUED | OUTPATIENT
Start: 2022-07-27 | End: 2022-07-27 | Stop reason: HOSPADM

## 2022-07-27 RX ORDER — LIDOCAINE HYDROCHLORIDE 10 MG/ML
1 INJECTION, SOLUTION EPIDURAL; INFILTRATION; INTRACAUDAL; PERINEURAL ONCE
Status: DISCONTINUED | OUTPATIENT
Start: 2022-07-27 | End: 2022-07-27 | Stop reason: HOSPADM

## 2022-07-27 RX ORDER — SODIUM CHLORIDE 9 MG/ML
INJECTION, SOLUTION INTRAVENOUS CONTINUOUS
Status: DISCONTINUED | OUTPATIENT
Start: 2022-07-27 | End: 2022-07-27 | Stop reason: HOSPADM

## 2022-07-27 RX ORDER — BETAMETHASONE SODIUM PHOSPHATE AND BETAMETHASONE ACETATE 3; 3 MG/ML; MG/ML
INJECTION, SUSPENSION INTRA-ARTICULAR; INTRALESIONAL; INTRAMUSCULAR; SOFT TISSUE
Status: DISCONTINUED
Start: 2022-07-27 | End: 2022-07-27 | Stop reason: HOSPADM

## 2022-07-27 RX ORDER — SODIUM CHLORIDE, SODIUM GLUCONATE, SODIUM ACETATE, POTASSIUM CHLORIDE AND MAGNESIUM CHLORIDE 30; 37; 368; 526; 502 MG/100ML; MG/100ML; MG/100ML; MG/100ML; MG/100ML
1000 INJECTION, SOLUTION INTRAVENOUS CONTINUOUS
Status: DISCONTINUED | OUTPATIENT
Start: 2022-07-27 | End: 2022-07-27 | Stop reason: HOSPADM

## 2022-07-27 RX ORDER — PROPOFOL 10 MG/ML
VIAL (ML) INTRAVENOUS
Status: DISCONTINUED | OUTPATIENT
Start: 2022-07-27 | End: 2022-07-27

## 2022-07-27 RX ADMIN — PROPOFOL 40 MG: 10 INJECTION, EMULSION INTRAVENOUS at 06:07

## 2022-07-27 RX ADMIN — SODIUM CHLORIDE, SODIUM GLUCONATE, SODIUM ACETATE, POTASSIUM CHLORIDE AND MAGNESIUM CHLORIDE: 526; 502; 368; 37; 30 INJECTION, SOLUTION INTRAVENOUS at 06:07

## 2022-07-27 NOTE — DISCHARGE SUMMARY
[facility] - Periop Services  Discharge Note  Short Stay    Procedure(s) (LRB):  Injection, Joint, Hip (Right)    OUTCOME: Patient tolerated treatment/procedure well without complication and is now ready for discharge.    DISPOSITION: Home or Self Care    FINAL DIAGNOSIS:  Primary osteoarthritis of right hip    FOLLOWUP: In clinic    DISCHARGE INSTRUCTIONS:  No discharge procedures on file.     TIME SPENT ON DISCHARGE: 5 minutes

## 2022-07-27 NOTE — ANESTHESIA PREPROCEDURE EVALUATION
07/26/2022  Rene Baer is a 71 y.o., male presents with Chronic Hip pain.  Diagnosis:        Primary osteoarthritis of right hip       (Primary osteoarthritis of right hip [M16.11])      He comes to [facility] for the noted procedure under IV Sedation w/ local.  Procedure:   Injection, Joint, Hip (Right )    PMHx:  Prostate cancer CAD (coronary artery disease)   Gout HTN (hypertension)   Chronic headaches Migraine   Osteoarthritis Chronic pain syndrome   Lumbar radiculopathy Cervical radiculopathy   Bilateral carpal tunnel syndrome Adjustment disorder       Surgical History  INSERTION OF PACEMAKER INTRAOPERATIVE RADIATION THERAPY   LUNG REMOVAL, PARTIAL CERVICAL FUSION   PENILE PROSTHESIS IMPLANT EPIDURAL STEROID INJECTION INTO CERVICAL SPINE   EPIDURAL STEROID INJECTION INTO LUMBAR SPINE Myelogram   Fluoroscopy of spinal cord INSERTION OF PERMANENT PACEMAKER   Neck fusion APPENDECTOMY         Vital signs:        Pre-op Assessment    I have reviewed the Patient Summary Reports.     I have reviewed the Nursing Notes. I have reviewed the NPO Status.   I have reviewed the Medications.     Review of Systems  Anesthesia Hx:  No problems with previous Anesthesia    Social:  Non-Smoker    Hematology/Oncology:  Hematology Normal   Oncology Normal     EENT/Dental:EENT/Dental Normal   Cardiovascular:   Exercise tolerance: good Hypertension CAD    Functional Capacity good / => 4 METS    Pulmonary:  Pulmonary Normal    Renal/:  Renal/ Normal     Hepatic/GI:  Hepatic/GI Normal    Musculoskeletal:   Arthritis     Neurological:   Neuromuscular Disease, Headaches    Endocrine:  Endocrine Normal    Dermatological:  Skin Normal    Psych:   Psychiatric History          Physical Exam  General: Alert, Oriented, Well nourished and Cooperative    Airway:  Mallampati: II   Mouth Opening: Normal  TM Distance: Normal  Tongue:  Normal  Neck ROM: Normal ROM    Dental:  Intact    Chest/Lungs:  Clear to auscultation, Normal Respiratory Rate    Heart:  Rate: Normal  Rhythm: Regular Rhythm        Anesthesia Plan  Type of Anesthesia, risks & benefits discussed:    Anesthesia Type: MAC, Gen Natural Airway  Intra-op Monitoring Plan: Standard ASA Monitors  Post Op Pain Control Plan: IV/PO Opioids PRN  Induction:  IV  ASA Score: 2  Day of Surgery Review of History & Physical: H&P Update referred to the surgeon/provider.    Ready For Surgery From Anesthesia Perspective.     .

## 2022-07-27 NOTE — OP NOTE
Date of Procedure: 7/27/2022    Procedure: R ACETABULOFEMORAL JOINT INJECTION (HIP)/arthrogram    Provider: Tono Farah MD    Pre-Operative Diagnosis: Primary osteoarthritis of right hip [M16.11]    Post-Operative Diagnosis: as above    Anesthesia: General/MAC    Description of the Findings of the Procedure:     The patient was brought to the procedure room.  IV access was obtained prior to the procedure.  The patient was positioned on the fluoroscopy table.  Sedation was administered by anesthesia.  The skin overlying the hip was prepped and draped in a sterile fashion.  The skin and subcutaneous tissue was anesthetized using 1-2 cc of lidocaine 2%.  An 18 gauge, spinal needle was slowly advanced through under fluoroscopic guidance into the acetabulofemoral joint. The needle position was confirmed using oblique, AP and lateral fluoroscopic imaging.  2 cc of Omnipaque 300 was injected confirming intra-articular contrast spread. A combination of 12 mg betamethasone and 2 cc 2% lidocaine was easily injected. The needle was removed and band-aid placed.  A sterile dressing was applied. No specimens collected. Rene was taken to the Post-block Recovery Area for further observation. And discharged home when appropriate.    Complications: No    Estimated Blood Loss (EBL): Minimal           Specimens: none           Condition: Good    Disposition: PACU - hemodynamically stable.      Fluoroscopic guidance was used for needle localization.  Images were saved and stored for documentation.  The hip structures were identified and visualized.  Dynamic visualization of the 18g x 3.5 cm needle was continuous throughout the procedure and maintained in good position.

## 2022-07-27 NOTE — ANESTHESIA POSTPROCEDURE EVALUATION
Anesthesia Post Evaluation    Patient: Rene Baer    Procedure(s) Performed: Procedure(s) (LRB):  Injection, Joint, Hip (Right)    Final Anesthesia Type: MAC      Patient location during evaluation: OPS  Patient participation: Yes- Able to Participate  Level of consciousness: awake and alert  Post-procedure vital signs: reviewed and stable  Pain management: adequate  Airway patency: patent    PONV status at discharge: No PONV  Anesthetic complications: no      Cardiovascular status: stable  Respiratory status: unassisted and spontaneous ventilation  Hydration status: euvolemic  Follow-up not needed.          Vitals Value Taken Time   BP 96/67 07/27/22 0637   Temp 35.4 °C (95.7 °F) 07/27/22 0538   Pulse 73 07/27/22 0638   Resp 18 07/27/22 0636   SpO2 97 % 07/27/22 0638   Vitals shown include unvalidated device data.      No case tracking events are documented in the log.      Pain/Jarad Score: No data recorded

## 2022-07-28 VITALS
RESPIRATION RATE: 18 BRPM | HEART RATE: 67 BPM | DIASTOLIC BLOOD PRESSURE: 80 MMHG | HEIGHT: 72 IN | BODY MASS INDEX: 28.99 KG/M2 | SYSTOLIC BLOOD PRESSURE: 136 MMHG | TEMPERATURE: 98 F | WEIGHT: 214.06 LBS | OXYGEN SATURATION: 98 %

## 2022-08-12 ENCOUNTER — DOCUMENTATION ONLY (OUTPATIENT)
Dept: ADMINISTRATIVE | Facility: HOSPITAL | Age: 72
End: 2022-08-12
Payer: MEDICARE

## 2022-08-22 DIAGNOSIS — G89.4 CHRONIC PAIN SYNDROME: ICD-10-CM

## 2022-08-22 RX ORDER — HYDROCODONE BITARTRATE AND ACETAMINOPHEN 10; 325 MG/1; MG/1
1 TABLET ORAL EVERY 6 HOURS PRN
Qty: 120 TABLET | Refills: 0 | Status: SHIPPED | OUTPATIENT
Start: 2022-08-22 | End: 2022-11-01

## 2022-08-23 DIAGNOSIS — Z86.010 PERSONAL HISTORY OF COLONIC POLYPS: Primary | ICD-10-CM

## 2022-08-24 ENCOUNTER — LAB VISIT (OUTPATIENT)
Dept: LAB | Facility: HOSPITAL | Age: 72
End: 2022-08-24
Attending: INTERNAL MEDICINE
Payer: MEDICARE

## 2022-08-24 ENCOUNTER — CLINICAL SUPPORT (OUTPATIENT)
Dept: RESPIRATORY THERAPY | Facility: HOSPITAL | Age: 72
End: 2022-08-24
Attending: INTERNAL MEDICINE
Payer: MEDICARE

## 2022-08-24 ENCOUNTER — ANESTHESIA EVENT (OUTPATIENT)
Dept: SURGERY | Facility: HOSPITAL | Age: 72
End: 2022-08-24
Payer: MEDICARE

## 2022-08-24 DIAGNOSIS — I12.9 PARENCHYMAL RENAL HYPERTENSION: ICD-10-CM

## 2022-08-24 DIAGNOSIS — Z86.010 PERSONAL HISTORY OF COLONIC POLYPS: Primary | ICD-10-CM

## 2022-08-24 DIAGNOSIS — R31.9 HEMATURIA SYNDROME: ICD-10-CM

## 2022-08-24 DIAGNOSIS — N18.2 CHRONIC KIDNEY DISEASE, STAGE II (MILD): ICD-10-CM

## 2022-08-24 DIAGNOSIS — Z86.010 PERSONAL HISTORY OF COLONIC POLYPS: ICD-10-CM

## 2022-08-24 LAB
ALBUMIN SERPL-MCNC: 3.5 GM/DL (ref 3.4–4.8)
ALBUMIN/GLOB SERPL: 1 RATIO (ref 1.1–2)
ALP SERPL-CCNC: 87 UNIT/L (ref 40–150)
ALT SERPL-CCNC: 17 UNIT/L (ref 0–55)
APTT PPP: 30 SECONDS (ref 23.2–33.7)
AST SERPL-CCNC: 17 UNIT/L (ref 5–34)
BASOPHILS # BLD AUTO: 0.02 X10(3)/MCL (ref 0–0.2)
BASOPHILS NFR BLD AUTO: 0.4 %
BILIRUBIN DIRECT+TOT PNL SERPL-MCNC: 0.4 MG/DL
BUN SERPL-MCNC: 23 MG/DL (ref 8.4–25.7)
CALCIUM SERPL-MCNC: 9.5 MG/DL (ref 8.8–10)
CHLORIDE SERPL-SCNC: 106 MMOL/L (ref 98–107)
CO2 SERPL-SCNC: 21 MMOL/L (ref 23–31)
CREAT SERPL-MCNC: 1.28 MG/DL (ref 0.73–1.18)
EOSINOPHIL # BLD AUTO: 0.13 X10(3)/MCL (ref 0–0.9)
EOSINOPHIL NFR BLD AUTO: 2.5 %
ERYTHROCYTE [DISTWIDTH] IN BLOOD BY AUTOMATED COUNT: 13.4 % (ref 11.5–17)
GFR SERPLBLD CREATININE-BSD FMLA CKD-EPI: 59 MLS/MIN/1.73/M2
GLOBULIN SER-MCNC: 3.4 GM/DL (ref 2.4–3.5)
GLUCOSE SERPL-MCNC: 92 MG/DL (ref 82–115)
HCT VFR BLD AUTO: 31.9 % (ref 42–52)
HGB BLD-MCNC: 10.2 GM/DL (ref 14–18)
IMM GRANULOCYTES # BLD AUTO: 0.01 X10(3)/MCL (ref 0–0.04)
IMM GRANULOCYTES NFR BLD AUTO: 0.2 %
INR BLD: 1 (ref 0–1.3)
LYMPHOCYTES # BLD AUTO: 0.95 X10(3)/MCL (ref 0.6–4.6)
LYMPHOCYTES NFR BLD AUTO: 18.2 %
MAGNESIUM SERPL-MCNC: 1.9 MG/DL (ref 1.6–2.6)
MCH RBC QN AUTO: 31.3 PG (ref 27–31)
MCHC RBC AUTO-ENTMCNC: 32 MG/DL (ref 33–36)
MCV RBC AUTO: 97.9 FL (ref 80–94)
MONOCYTES # BLD AUTO: 0.48 X10(3)/MCL (ref 0.1–1.3)
MONOCYTES NFR BLD AUTO: 9.2 %
NEUTROPHILS # BLD AUTO: 3.6 X10(3)/MCL (ref 2.1–9.2)
NEUTROPHILS NFR BLD AUTO: 69.5 %
PLATELET # BLD AUTO: 256 X10(3)/MCL (ref 130–400)
PMV BLD AUTO: 11 FL (ref 7.4–10.4)
POTASSIUM SERPL-SCNC: 4.5 MMOL/L (ref 3.5–5.1)
PROT SERPL-MCNC: 6.9 GM/DL (ref 5.8–7.6)
PROTHROMBIN TIME: 13.1 SECONDS (ref 12.5–14.5)
RBC # BLD AUTO: 3.26 X10(6)/MCL (ref 4.7–6.1)
SODIUM SERPL-SCNC: 136 MMOL/L (ref 136–145)
WBC # SPEC AUTO: 5.2 X10(3)/MCL (ref 4.5–11.5)

## 2022-08-24 PROCEDURE — 36415 COLL VENOUS BLD VENIPUNCTURE: CPT

## 2022-08-24 PROCEDURE — 85610 PROTHROMBIN TIME: CPT

## 2022-08-24 PROCEDURE — 85025 COMPLETE CBC W/AUTO DIFF WBC: CPT

## 2022-08-24 PROCEDURE — 83735 ASSAY OF MAGNESIUM: CPT

## 2022-08-24 PROCEDURE — 80053 COMPREHEN METABOLIC PANEL: CPT

## 2022-08-24 PROCEDURE — 85730 THROMBOPLASTIN TIME PARTIAL: CPT

## 2022-08-24 PROCEDURE — 93005 ELECTROCARDIOGRAM TRACING: CPT

## 2022-08-24 NOTE — ANESTHESIA PREPROCEDURE EVALUATION
08/24/2022  Rene Baer is a 71 y.o., male.      Pre-op Assessment    I have reviewed the Patient Summary Reports.     I have reviewed the Nursing Notes. I have reviewed the NPO Status.   I have reviewed the Medications.     Review of Systems  Anesthesia Hx:  Denies Family Hx of Anesthesia complications.   Denies Personal Hx of Anesthesia complications.   Hematology/Oncology:  Hematology Normal   Oncology Normal     EENT/Dental:EENT/Dental Normal   Cardiovascular:   Hypertension CAD      Pulmonary:   Sleep Apnea    Renal/:  Renal/ Normal     Hepatic/GI:  Hepatic/GI Normal    Musculoskeletal:   Arthritis     Neurological:   Neuromuscular Disease, Headaches    Endocrine:  Endocrine Normal    Dermatological:  Skin Normal    Psych:   Psychiatric History          Physical Exam  General: Cooperative, Alert and Oriented    Airway:  Mallampati: II   Mouth Opening: Normal  TM Distance: Normal  Tongue: Normal  Neck ROM: Normal ROM    Dental:  Intact        Anesthesia Plan  Type of Anesthesia, risks & benefits discussed:    Anesthesia Type: Gen Natural Airway  Intra-op Monitoring Plan: Standard ASA Monitors  Induction:  IV  Informed Consent: Patient consented to blood products? Yes  ASA Score: 3    Ready For Surgery From Anesthesia Perspective.     .

## 2022-08-25 ENCOUNTER — ANESTHESIA (OUTPATIENT)
Dept: SURGERY | Facility: HOSPITAL | Age: 72
End: 2022-08-25
Payer: MEDICARE

## 2022-08-25 ENCOUNTER — HOSPITAL ENCOUNTER (OUTPATIENT)
Facility: HOSPITAL | Age: 72
Discharge: HOME | End: 2022-08-25
Attending: INTERNAL MEDICINE | Admitting: INTERNAL MEDICINE
Payer: MEDICARE

## 2022-08-25 VITALS
HEART RATE: 68 BPM | DIASTOLIC BLOOD PRESSURE: 77 MMHG | TEMPERATURE: 98 F | RESPIRATION RATE: 18 BRPM | BODY MASS INDEX: 29.02 KG/M2 | OXYGEN SATURATION: 98 % | SYSTOLIC BLOOD PRESSURE: 119 MMHG | WEIGHT: 214 LBS

## 2022-08-25 DIAGNOSIS — Z86.010 PERSONAL HISTORY OF COLONIC POLYPS: ICD-10-CM

## 2022-08-25 DIAGNOSIS — Z86.010 HX OF COLONIC POLYPS: ICD-10-CM

## 2022-08-25 PROCEDURE — 45380 COLONOSCOPY AND BIOPSY: CPT | Performed by: INTERNAL MEDICINE

## 2022-08-25 PROCEDURE — 63600175 PHARM REV CODE 636 W HCPCS: Performed by: NURSE ANESTHETIST, CERTIFIED REGISTERED

## 2022-08-25 PROCEDURE — 37000009 HC ANESTHESIA EA ADD 15 MINS: Performed by: INTERNAL MEDICINE

## 2022-08-25 PROCEDURE — 63600175 PHARM REV CODE 636 W HCPCS: Performed by: ANESTHESIOLOGY

## 2022-08-25 PROCEDURE — 25000003 PHARM REV CODE 250: Performed by: NURSE ANESTHETIST, CERTIFIED REGISTERED

## 2022-08-25 PROCEDURE — 37000008 HC ANESTHESIA 1ST 15 MINUTES: Performed by: INTERNAL MEDICINE

## 2022-08-25 PROCEDURE — 27201423 OPTIME MED/SURG SUP & DEVICES STERILE SUPPLY: Performed by: INTERNAL MEDICINE

## 2022-08-25 RX ORDER — LIDOCAINE HYDROCHLORIDE 10 MG/ML
1 INJECTION, SOLUTION EPIDURAL; INFILTRATION; INTRACAUDAL; PERINEURAL ONCE
Status: ACTIVE | OUTPATIENT
Start: 2022-08-25

## 2022-08-25 RX ORDER — PROPOFOL 10 MG/ML
VIAL (ML) INTRAVENOUS
Status: DISCONTINUED | OUTPATIENT
Start: 2022-08-25 | End: 2022-08-25

## 2022-08-25 RX ORDER — SODIUM CHLORIDE, SODIUM LACTATE, POTASSIUM CHLORIDE, CALCIUM CHLORIDE 600; 310; 30; 20 MG/100ML; MG/100ML; MG/100ML; MG/100ML
INJECTION, SOLUTION INTRAVENOUS CONTINUOUS
Status: ACTIVE | OUTPATIENT
Start: 2022-08-25

## 2022-08-25 RX ORDER — LIDOCAINE HYDROCHLORIDE 20 MG/ML
INJECTION, SOLUTION EPIDURAL; INFILTRATION; INTRACAUDAL; PERINEURAL
Status: DISCONTINUED | OUTPATIENT
Start: 2022-08-25 | End: 2022-08-25

## 2022-08-25 RX ADMIN — LIDOCAINE HYDROCHLORIDE 100 ML: 20 INJECTION, SOLUTION EPIDURAL; INFILTRATION; INTRACAUDAL; PERINEURAL at 07:08

## 2022-08-25 RX ADMIN — PROPOFOL 80 MG: 10 INJECTION, EMULSION INTRAVENOUS at 07:08

## 2022-08-25 RX ADMIN — SODIUM CHLORIDE, POTASSIUM CHLORIDE, SODIUM LACTATE AND CALCIUM CHLORIDE: 600; 310; 30; 20 INJECTION, SOLUTION INTRAVENOUS at 06:08

## 2022-08-25 NOTE — ANESTHESIA POSTPROCEDURE EVALUATION
Anesthesia Post Evaluation    Patient: Rene Baer    Procedure(s) Performed: Procedure(s) (LRB):  COLONOSCOPY (N/A)    Final Anesthesia Type: MAC      Patient location during evaluation: OPS  Patient participation: Yes- Able to Participate  Level of consciousness: awake and alert  Post-procedure vital signs: reviewed and stable  Pain management: adequate  Airway patency: patent  JAVIER mitigation strategies: Multimodal analgesia  PONV status at discharge: No PONV  Anesthetic complications: no      Cardiovascular status: hemodynamically stable  Respiratory status: unassisted, spontaneous ventilation and room air  Hydration status: euvolemic  Follow-up not needed.  Comments: Patient to bed per self          Vitals Value Taken Time   /78 08/25/22 0625   Temp 36.5 °C (97.7 °F) 08/25/22 0625   Pulse 65 08/25/22 0625   Resp 23 08/25/22 0729   SpO2 99 % 08/25/22 0625         No case tracking events are documented in the log.      Pain/Jarad Score: No data recorded

## 2022-08-25 NOTE — OP NOTE
[facility] - Periop Services  Operative Note    Date of Procedure: 8/25/2022     Procedure: Procedure(s) (LRB):  COLONOSCOPY (N/A)  POLYPECTOMY WITH COLD BIOPSY FORCEP (N/A)       Surgeon(s) and Role:     * Luis Felipe Mckeon III, MD - Primary    Assisting Surgeon: None    Pre-Operative Diagnosis: Personal history of colonic polyps [Z86.010]      Post-Operative Diagnosis: Post-Op Diagnosis Codes:     * Personal history of colonic polyps [Z86.010]  1.  subcentimeter splenic flexure cold biopsy polypectomy    Endoscopic Specimens:  ID Type Source Tests Collected by Time Destination   1 : Splenic flexure polypectomy Tissue Polyp SPECIMEN TO PATHOLOGY Luis Felipe Mckeon III, MD 8/25/2022 0792          Anesthesia: General    Consent:  Patient was consented for the procedure at my office.  The risks and benefits of procedure explained in detail.  They were willing to undergo those risks.    HPI & Operative Findings (including complications, if any):       this is a 69-year-old black male with a history of colon polyps.  He is here for surveillance.  No alarm symptoms.  Average colorectal cancer risk.    He was brought down to the endoscopy suite.  Meek Torres CRNA present.  Cc documentation medications administered.      He was laid in left lateral decubitus position.  Rectal exam was normal.  Prostate normal.      The Olympus colonoscope was advanced easily all the way to the cecum.  The cecum was visualized and photographed.  The terminal ileum was intubated up to about 5 cm there were no abnormalities noted there.      360° visualization of the entire colon was on performed.  Prep was adequate.  There were no abnormalities noted to the cecum, ascending colon, hepatic flexure, transverse colon.  There was a small 1-2 mm round polyp that was at the splenic flexure.  This was harvested with cold biopsy forceps.    This was sent off and  Jar 1..  No perforation of bleed.      The rest of the descending colon  sigmoid were free of any mass lesions or polyps.  No diverticular disease.  The rectum on retroflexion showed no internal abnormalities.  The scope was removed the patient tolerated procedure well without any complications.      Blood Loss (EBL): * No values recorded between 8/25/2022 12:00 AM and 8/25/2022  7:46 AM *           Implants: * No implants in log *    Specimens:   Specimen (24h ago, onward)             Start     Ordered    08/25/22 0745  Specimen to Pathology  RELEASE UPON ORDERING        References:    Click here for ordering Quick Tip   Question:  Release to patient  Answer:  Immediate    08/25/22 0745                        Condition: Stable for Discharge    Disposition: Home or Self Care        Discharge Note    OUTCOME: Patient tolerated treatment/procedure well without complication and is now ready for discharge.    DISPOSITION: Home or Self Care    FINAL DIAGNOSIS:  <principal problem not specified>    FOLLOWUP: Follow up in clinic in 1-2 weeks to review biopsies    DISCHARGE INSTRUCTIONS:    Discharge Procedure Orders   Diet general        Clinical Reference Documents Added to Patient Instructions       Document    COLONOSCOPY DISCHARGE INSTRUCTIONS (ENGLISH)        [facility] - Periop Services  Operative Note    Date of Procedure: 8/25/2022     Procedure: Procedure(s) (LRB):  COLONOSCOPY (N/A)  POLYPECTOMY WITH COLD BIOPSY FORCEP (N/A)       Surgeon(s) and Role:     * Luis Felipe Mckeon III, MD - Primary    Assisting Surgeon: None    Pre-Operative Diagnosis: Personal history of colonic polyps [Z86.010]      Post-Operative Diagnosis: Post-Op Diagnosis Codes:     * Personal history of colonic polyps [Z86.010]  2.     Endoscopic Specimens:  ID Type Source Tests Collected by Time Destination   1 : Splenic flexure polypectomy Tissue Polyp SPECIMEN TO PATHOLOGY Luis Felipe Mckeon III, MD 8/25/2022 [id number]          Anesthesia: General    Consent:  Patient was consented for the procedure at my  office.  The risks and benefits of procedure explained in detail.  They were willing to undergo those risks.    HPI & Operative Findings (including complications, if any):     Blood Loss (EBL): * No values recorded between 8/25/2022 12:00 AM and 8/25/2022  7:46 AM *           Implants: * No implants in log *    Specimens:   Specimen (24h ago, onward)             Start     Ordered    08/25/22 0745  Specimen to Pathology  RELEASE UPON ORDERING        References:    Click here for ordering Quick Tip   Question:  Release to patient  Answer:  Immediate    08/25/22 0745                        Condition: Stable for Discharge    Disposition: Home or Self Care      Thinking note to Mrs. Debora Wisdom for allowing me to participate in the patient's.        Discharge Note    OUTCOME: Patient tolerated treatment/procedure well without complication and is now ready for discharge.    DISPOSITION: Home or Self Care    FINAL DIAGNOSIS:  <principal problem not specified>    FOLLOWUP: Follow up in clinic in 1-2 weeks to review biopsies    DISCHARGE INSTRUCTIONS:    Discharge Procedure Orders   Diet general        Clinical Reference Documents Added to Patient Instructions       Document    COLONOSCOPY DISCHARGE INSTRUCTIONS (ENGLISH)        He you for.[email or web address]

## 2022-08-26 LAB
ESTROGEN SERPL-MCNC: NORMAL PG/ML
INSULIN SERPL-ACNC: NORMAL U[IU]/ML
LAB AP CLINICAL INFORMATION: NORMAL
LAB AP GROSS DESCRIPTION: NORMAL
LAB AP REPORT FOOTNOTES: NORMAL
T3RU NFR SERPL: NORMAL %

## 2022-09-07 DIAGNOSIS — M47.816 LUMBAR SPONDYLOSIS: ICD-10-CM

## 2022-09-07 DIAGNOSIS — M47.812 CERVICAL SPONDYLOSIS WITHOUT MYELOPATHY: ICD-10-CM

## 2022-09-07 RX ORDER — TIZANIDINE 4 MG/1
4 TABLET ORAL EVERY 8 HOURS PRN
Qty: 90 TABLET | Refills: 1 | Status: SHIPPED | OUTPATIENT
Start: 2022-09-07 | End: 2022-09-19 | Stop reason: SDUPTHER

## 2022-09-19 ENCOUNTER — OFFICE VISIT (OUTPATIENT)
Dept: NEUROLOGY | Facility: CLINIC | Age: 72
End: 2022-09-19
Payer: MEDICARE

## 2022-09-19 VITALS
WEIGHT: 214 LBS | DIASTOLIC BLOOD PRESSURE: 68 MMHG | SYSTOLIC BLOOD PRESSURE: 116 MMHG | HEIGHT: 72 IN | BODY MASS INDEX: 28.99 KG/M2

## 2022-09-19 DIAGNOSIS — M47.816 LUMBAR SPONDYLOSIS: ICD-10-CM

## 2022-09-19 DIAGNOSIS — M47.812 CERVICAL SPONDYLOSIS: ICD-10-CM

## 2022-09-19 DIAGNOSIS — M47.812 CERVICAL SPONDYLOSIS WITHOUT MYELOPATHY: ICD-10-CM

## 2022-09-19 DIAGNOSIS — G89.4 CHRONIC PAIN SYNDROME: Primary | ICD-10-CM

## 2022-09-19 PROCEDURE — 99999 PR PBB SHADOW E&M-EST. PATIENT-LVL V: CPT | Mod: PBBFAC,,, | Performed by: NURSE PRACTITIONER

## 2022-09-19 PROCEDURE — 99213 PR OFFICE/OUTPT VISIT, EST, LEVL III, 20-29 MIN: ICD-10-PCS | Mod: S$PBB,,, | Performed by: NURSE PRACTITIONER

## 2022-09-19 PROCEDURE — 99215 OFFICE O/P EST HI 40 MIN: CPT | Mod: PBBFAC | Performed by: NURSE PRACTITIONER

## 2022-09-19 PROCEDURE — 99999 PR PBB SHADOW E&M-EST. PATIENT-LVL V: ICD-10-PCS | Mod: PBBFAC,,, | Performed by: NURSE PRACTITIONER

## 2022-09-19 PROCEDURE — 99213 OFFICE O/P EST LOW 20 MIN: CPT | Mod: S$PBB,,, | Performed by: NURSE PRACTITIONER

## 2022-09-19 RX ORDER — TIZANIDINE 4 MG/1
4 TABLET ORAL EVERY 8 HOURS PRN
Qty: 90 TABLET | Refills: 1 | Status: SHIPPED | OUTPATIENT
Start: 2022-09-19 | End: 2022-11-10

## 2022-09-19 NOTE — PROGRESS NOTES
Subjective:       Patient ID: Rene Baer is a 71 y.o. male.    Chief Complaint:  Chronic Pain Syndrome (3 month f/u for chronic pain syndrome. Patient reports pain level today is an 8 located from cervical to lumbar region and describes pain as constant aching.)      History of Present Illness  Patient presents for follow up of chronic pain. Patient reports he has pain to his lumbar and cervical spine. Rates pain an 8/10. Describes pain to neck as a shocking pain with limited ROM moving from side to side.  Describes pain as an aching pain. Movement makes pain worse. Patient has history of cervical spine fusion 20-25 years ago. He last completed PT for his neck and low back several years ago. He continues home PT exercises. Patient had CT lumbar spine in 4/2021 ordered by Dr. Delacruz; at that time per patient Dr. Delacruz had recommended surgery but patient was not interested. Patient is mostly complaining of hip pain. Reports he saw Dr. Eden and Dr. Farah at [facility]. He received a hip injection 7/27 but patient reports pain is severe today. States pain medication is the only thing helpful for his pain.                  EXAM: CT Cervical Spine W Contrast     REASON FOR STUDY: M47.12     TECHNIQUE: CT images of the cervical spine were obtained following  intrathecal administration of contrast material. Axial, coronal, and  sagittal reformatted images were obtained. Dose length product is 448  mGycm. Automatic exposure control, adjustment of mA/kV or iterative  reconstruction technique was used to limit radiation dose.     COMPARISON: CT cervical spine dated 12/8/2020     FINDINGS:      There is reversal of normal cervical lordosis with grade 1  anterolisthesis of C4 over C5. There is bony fusion of the C3-4 and  C5-C7 vertebral bodies. There is no acute fracture identified.     C2-C3: Small posterior osteophyte complex and thickening of ligamentum  flavum cause mild narrowing of the spinal canal. There is  mild  bilateral neural foraminal narrowing secondary to uncovertebral and  facet hypertrophy.     C3-C4: No disc herniation. No significant spinal canal or neural  foraminal stenosis.     C4-C5: Grade 1 anterolisthesis of C4 over C5. Severe disc height loss  without disc herniation. No significant spinal canal stenosis. Mild  bilateral neural foraminal stenosis secondary to uncovertebral and  facet hypertrophy.     C5-C6: No disc herniation. No significant spinal canal stenosis. Mild  to moderate right and moderate left neural foraminal stenosis  secondary to uncovertebral and facet hypertrophy.     C6-C7: Severe disc height loss without disc herniation. No significant  spinal canal stenosis. Moderate to severe right and severe left neural  foraminal stenosis secondary to uncovertebral and facet hypertrophy.     C7-T1: Mild disc height loss without disc herniation. No significant  spinal canal stenosis. Mild bilateral neural foraminal stenosis  secondary to uncovertebral and facet hypertrophy.     The paraspinal soft tissues are unremarkable.        IMPRESSION:   1.  Mild degenerative spinal canal narrowing at C2-C3.  2.  Multilevel neural foraminal stenoses as described, severe on the  left at C6-C7.                EXAM: CT Lumbar Spine W Contrast     REASON FOR STUDY: M48.062     TECHNIQUE: Noncontrast CT images of the lumbar spine. Axial, coronal,  and sagittal reformatted images were obtained. Dose length product is  703 mGycm. Automatic exposure control, adjustment of mA/kV or  iterative reconstruction technique was used to limit radiation dose.     COMPARISON: CT lumbar spine dated 12/8/2020, CT lumbar spine myelogram  dated 3/3/2017     FINDINGS:      There are 5 nonrib-bearing lumbar-type vertebral bodies. Alignment is  normal without subluxation. The vertebral body heights are maintained.  There are small multilevel marginal osteophytes. A well-defined  expansile lesion in the sacrum remains unchanged  compared to 3/3/2017  and may represent a Tarlov cyst.     The conus terminates at the level of T12-L1. It is normal in contour.     L1-L2: No disc herniation. Mild bilateral facet hypertrophy. No  significant spinal canal stenosis. Mild bilateral neural foraminal  stenosis.     L2-L3: Mild disc height loss with disc bulge, facet hypertrophy and  thickening of ligamentum flavum which cause moderate to severe spinal  canal stenosis with near effacement of the CSF space. Mild left and  moderate right neural foraminal stenosis.     L3-L4: Mild disc height loss with disc vacuum phenomenon, disc bulge,  facet hypertrophy and thickening of ligamentum flavum which cause  severe spinal canal stenosis with complete effacement of the CSF  space. Moderate left and moderate to severe right neural foraminal  stenosis.     L4-L5: Disc height loss asymmetric on the left, minimal disc height  loss with disc bulge, severe facet hypertrophy and thickening of the  ligamentum flavum which cause severe spinal canal stenosis with  complete effacement of the CSF space. There is mild right and moderate  to severe left neural foraminal stenosis.     L5-S1: No significant disc height loss. No disc herniation. Bilateral  facet hypertrophy. No significant spinal canal or neural foraminal  stenosis.     The posterior paraspinal soft tissues are unremarkable.     IMPRESSION:   1.  Severe degenerative spinal canal stenosis at L3-L4 and L4-L5,  moderate to severe at L2-L3, progressed from 3/3/2017.  2.  Multilevel neural foraminal stenoses as described.      Past Medical History:   Diagnosis Date    Adjustment disorder     Bilateral carpal tunnel syndrome     CAD (coronary artery disease)     Cervical radiculopathy     Chronic headaches     Chronic pain syndrome     Gout     HTN (hypertension)     Lumbar radiculopathy     Migraine     Osteoarthritis     Pacemaker     Prostate cancer     Sleep apnea        Past Surgical History:    Procedure Laterality Date    APPENDECTOMY      CERVICAL FUSION      COLONOSCOPY N/A 8/25/2022    Procedure: COLONOSCOPY;  Surgeon: Luis Felipe Mckeon III, MD;  Location: [facility];  Service: Endoscopy;  Laterality: N/A;    EPIDURAL STEROID INJECTION INTO CERVICAL SPINE      EPIDURAL STEROID INJECTION INTO LUMBAR SPINE      Fluoroscopy of spinal cord      INJECTION OF JOINT Right 7/27/2022    Procedure: Injection, Joint, Hip;  Surgeon: Tono Farah MD;  Location: [facility] OR;  Service: Orthopedics;  Laterality: Right;    INSERTION OF PACEMAKER      INSERTION OF PERMANENT PACEMAKER      INTRAOPERATIVE RADIATION THERAPY      LUNG REMOVAL, PARTIAL      Myelogram      Neck fusion      PENILE PROSTHESIS IMPLANT         Family History   Problem Relation Age of Onset    Heart disease Mother     Hypertension Mother     Heart disease Sister     Hypertension Sister     Heart disease Brother     Hypertension Brother        Social History     Socioeconomic History    Marital status:    Tobacco Use    Smoking status: Never    Smokeless tobacco: Never   Substance and Sexual Activity    Alcohol use: Not Currently     Comment: RARE    Drug use: Never    Sexual activity: Yes       Current Outpatient Medications   Medication Sig Dispense Refill    albuterol (PROVENTIL/VENTOLIN HFA) 90 mcg/actuation inhaler Inhale 2 puffs into the lungs every 6 (six) hours as needed.      allopurinoL (ZYLOPRIM) 100 MG tablet Take 100 mg by mouth daily as needed.      aspirin (ECOTRIN) 81 MG EC tablet Take 81 mg by mouth every evening. Stopped x 2 weeks      atorvastatin (LIPITOR) 40 MG tablet Take 40 mg by mouth every evening.      bicalutamide (CASODEX) 50 MG Tab Take 50 mg by mouth every evening.      cetirizine (ZYRTEC) 10 MG tablet Take 10 mg by mouth every evening.      COLCRYS 0.6 mg tablet Take 0.6 mg by mouth 2 (two) times daily as needed.      diclofenac (VOLTAREN) 75 MG EC tablet Take 75 mg by mouth 2  (two) times daily as needed.      dorzolamide-timolol 2-0.5% (COSOPT) 22.3-6.8 mg/mL ophthalmic solution Place 1 drop into both eyes every 12 (twelve) hours as needed.      ferrous gluconate (FERGON) 324 MG tablet Take 1 tablet by mouth 2 (two) times daily.      fluticasone furoate-vilanteroL (BREO) 200-25 mcg/dose DsDv diskus inhaler Inhale 1 puff into the lungs every morning.      fluticasone propionate (FLONASE) 50 mcg/actuation nasal spray 1 spray by Each Nostril route daily as needed.      gabapentin (NEURONTIN) 300 MG capsule TAKE TWO CAPSULES BY MOUTH THREE TIMES A  capsule prn    HYDROcodone-acetaminophen (NORCO)  mg per tablet Take 1 tablet by mouth every 6 (six) hours as needed for Pain. 120 tablet 0    isosorbide mononitrate (IMDUR) 30 MG 24 hr tablet Take 30 mg by mouth every evening.      lactulose (CHRONULAC) 10 gram/15 mL solution Take 30 g by mouth 2 (two) times daily as needed.      leuprolide acetate (LUPRON DEPOT IM) Inject into the muscle.      lisinopril (PRINIVIL,ZESTRIL) 20 MG tablet Take 20 mg by mouth every evening.      meloxicam (MOBIC) 15 MG tablet Take 15 mg by mouth every evening.      metoprolol succinate (TOPROL-XL) 25 MG 24 hr tablet Take 25 mg by mouth every evening.      multivitamin capsule Take 1 capsule by mouth every morning.      MYRBETRIQ 25 mg Tb24 ER tablet Take 25 mg by mouth 2 (two) times a day.      nitroGLYCERIN (NITROSTAT) 0.4 MG SL tablet Place 0.4 mg under the tongue every 5 (five) minutes as needed.      pantoprazole (PROTONIX) 40 MG tablet Take 40 mg by mouth daily as needed.      potassium bicarbonate disintegrating tablet Take by mouth 2 (two) times daily.      potassium citrate (UROCIT-K 15) 15 mEq TbSR Take 1 tablet by mouth 2 (two) times daily.      sertraline (ZOLOFT) 50 MG tablet TAKE ONE TABLET BY MOUTH AT BEDTIME 30 tablet 5    tamsulosin (FLOMAX) 0.4 mg Cap Take 0.4 mg by mouth every morning.      tiZANidine (ZANAFLEX) 4  MG tablet Take 1 tablet (4 mg total) by mouth every 8 (eight) hours as needed (pain/spasms). 90 tablet 1    topiramate (TOPAMAX) 25 MG tablet Take 25 mg by mouth every morning.      triamcinolone acetonide 0.1% (KENALOG) 0.1 % ointment Apply 1 application topically 2 (two) times daily as needed.       No current facility-administered medications for this visit.     Facility-Administered Medications Ordered in Other Visits   Medication Dose Route Frequency Provider Last Rate Last Admin    lactated ringers infusion   Intravenous Continuous Anastacio Goetz DO 10 mL/hr at 08/25/22 0629 New Bag at 08/25/22 0629    LIDOcaine (PF) 10 mg/ml (1%) injection 10 mg  1 mL Intradermal Once Anastacio Goetz DO           Review of patient's allergies indicates:   Allergen Reactions    Fentanyl     Proparacaine     Tropicamide      Other reaction(s): disoriented         Review of Systems  Review of Systems   Musculoskeletal:  Positive for back pain and gait problem.   All other systems reviewed and are negative.    Objective:      Neurologic Exam     Mental Status   Oriented to person, place, and time.   Level of consciousness: alert    Cranial Nerves   Cranial nerves II through XII intact.     Motor Exam   Muscle bulk: normal  Overall muscle tone: normal  Right arm tone: normal  Left arm tone: normal  Right leg tone: normal  Left leg tone: normal    Strength   Strength 5/5 throughout.     Sensory Exam   Light touch normal.     Gait, Coordination, and Reflexes     Gait  Gait: normal    Physical Exam  Vitals and nursing note reviewed.   Constitutional:       Appearance: Normal appearance.   Neurological:      Mental Status: He is alert and oriented to person, place, and time.      Cranial Nerves: Cranial nerves 2-12 are intact.      Motor: Motor strength is normal.      Gait: Gait is intact.         Assessment:        1. Chronic pain syndrome    2. Lumbar spondylosis    3. Cervical spondylosis        Plan:   Will send  referral to Dr. Sexton for pain management, no longer able to receive further narcotic refills from Dr. Campbell  Discussed SCS; patient not interested  Advised to follow up as needed if he would like to proceed with any further interventional pain procedure    MDM low

## 2022-09-24 ENCOUNTER — HOSPITAL ENCOUNTER (EMERGENCY)
Facility: HOSPITAL | Age: 72
Discharge: HOME | End: 2022-09-25
Attending: EMERGENCY MEDICINE
Payer: MEDICARE

## 2022-09-24 VITALS
HEART RATE: 79 BPM | OXYGEN SATURATION: 99 % | DIASTOLIC BLOOD PRESSURE: 84 MMHG | RESPIRATION RATE: 16 BRPM | TEMPERATURE: 99 F | WEIGHT: 220 LBS | BODY MASS INDEX: 29.84 KG/M2 | SYSTOLIC BLOOD PRESSURE: 133 MMHG

## 2022-09-24 DIAGNOSIS — M10.9 ACUTE GOUT OF LEFT ANKLE, UNSPECIFIED CAUSE: Primary | ICD-10-CM

## 2022-09-24 PROCEDURE — 96372 THER/PROPH/DIAG INJ SC/IM: CPT | Performed by: EMERGENCY MEDICINE

## 2022-09-24 PROCEDURE — 63600175 PHARM REV CODE 636 W HCPCS: Performed by: EMERGENCY MEDICINE

## 2022-09-24 PROCEDURE — 25000003 PHARM REV CODE 250: Performed by: EMERGENCY MEDICINE

## 2022-09-24 PROCEDURE — 99284 EMERGENCY DEPT VISIT MOD MDM: CPT | Mod: 25

## 2022-09-24 RX ORDER — HYDROCODONE BITARTRATE AND ACETAMINOPHEN 10; 325 MG/1; MG/1
1 TABLET ORAL
Status: COMPLETED | OUTPATIENT
Start: 2022-09-24 | End: 2022-09-24

## 2022-09-24 RX ORDER — ALLOPURINOL 100 MG/1
100 TABLET ORAL DAILY
Qty: 90 TABLET | Refills: 0 | OUTPATIENT
Start: 2022-09-24 | End: 2023-02-13

## 2022-09-24 RX ORDER — METHYLPREDNISOLONE SOD SUCC 125 MG
125 VIAL (EA) INJECTION
Status: COMPLETED | OUTPATIENT
Start: 2022-09-24 | End: 2022-09-24

## 2022-09-24 RX ADMIN — HYDROCODONE BITARTRATE AND ACETAMINOPHEN 1 TABLET: 10; 325 TABLET ORAL at 11:09

## 2022-09-24 RX ADMIN — METHYLPREDNISOLONE SODIUM SUCCINATE 125 MG: 125 INJECTION, POWDER, FOR SOLUTION INTRAMUSCULAR; INTRAVENOUS at 11:09

## 2022-09-25 NOTE — ED PROVIDER NOTES
Encounter Date: 9/24/2022       History     Chief Complaint   Patient presents with    Ankle Pain     71-year-old male with a history of gout complains of left ankle pain for the last 2 days.  He states he may have bumped it, but does not think he hit it hard.  He is currently out of his allopurinol.  He takes Norco 10 mg 4 times daily for chronic pain.  He is asking for steroid shot.      Review of patient's allergies indicates:   Allergen Reactions    Fentanyl      Other reaction(s): unknown    Proparacaine     Tropicamide      Other reaction(s): disoriented     Past Medical History:   Diagnosis Date    Adjustment disorder     Bilateral carpal tunnel syndrome     CAD (coronary artery disease)     Cervical radiculopathy     Chronic headaches     Chronic pain syndrome     Gout     HTN (hypertension)     Lumbar radiculopathy     Migraine     Osteoarthritis     Pacemaker     Prostate cancer     Sleep apnea      Past Surgical History:   Procedure Laterality Date    APPENDECTOMY      CERVICAL FUSION      COLONOSCOPY N/A 08/25/2022    Procedure: COLONOSCOPY;  Surgeon: Luis Felipe Mckeon III, MD;  Location: [facility];  Service: Endoscopy;  Laterality: N/A;    EPIDURAL STEROID INJECTION INTO CERVICAL SPINE      EPIDURAL STEROID INJECTION INTO LUMBAR SPINE      Fluoroscopy of spinal cord      INJECTION OF JOINT Right 07/27/2022    Procedure: Injection, Joint, Hip;  Surgeon: Tono Farah MD;  Location: [facility];  Service: Orthopedics;  Laterality: Right;    INSERTION OF PACEMAKER      INSERTION OF PERMANENT PACEMAKER      INTRAOPERATIVE RADIATION THERAPY      LUNG REMOVAL, PARTIAL      Myelogram      Neck fusion      PENILE PROSTHESIS IMPLANT       Family History   Problem Relation Age of Onset    Heart disease Mother     Hypertension Mother     Heart disease Sister     Hypertension Sister     Heart disease Brother     Hypertension Brother      Social History     Tobacco Use    Smoking status: Never    Smokeless tobacco:  Never   Substance Use Topics    Alcohol use: Not Currently     Comment: RARE    Drug use: Never     Review of Systems   Musculoskeletal:  Positive for arthralgias.   All other systems reviewed and are negative.    Physical Exam     Initial Vitals [09/24/22 2247]   BP Pulse Resp Temp SpO2   133/84 79 16 98.6 °F (37 °C) 99 %      MAP       --         Physical Exam    Nursing note and vitals reviewed.  Constitutional: Vital signs are normal. He appears well-developed and well-nourished.   HENT:   Head: Normocephalic and atraumatic.   Eyes: EOM are normal. Pupils are equal, round, and reactive to light.   Neck: Neck supple.   Musculoskeletal:      Cervical back: Neck supple. No edema or erythema.      Comments: Left ankle has heat to the medial malleolus with swelling and mild tenderness.  Range of motion decreased due to pain.  2+ pedal pulse with brisk capillary refill to the toes.     Neurological: He is alert and oriented to person, place, and time.   Skin: Skin is warm and dry.   Psychiatric: He has a normal mood and affect.       ED Course   Procedures  Labs Reviewed - No data to display       Imaging Results              X-Ray Ankle Complete Left (In process)                      Medications   methylPREDNISolone sodium succinate injection 125 mg (125 mg Intramuscular [place] 9/24/22 2339)   HYDROcodone-acetaminophen  mg per tablet 1 tablet (1 tablet Oral [place] 9/24/22 2339)     Medical Decision Making:   Initial Assessment:   71-year-old male complains of left ankle pain for the last 2 days with a history of gout  Differential Diagnosis:   Differential diagnosis includes but is not limited to gout, septic joint, trauma.  Clinical Tests:   Radiological Study: Reviewed  ED Management:  Patient was given a shot of Solu-Medrol and a dose of Norco 10 mg.  He is taking an anti-inflammatory already and I will give him a prescription for allopurinol.  I recommend that he call his primary care provider for follow-up.   I believe his exam findings and symptoms are most consistent with his history of gout flare ups and his left ankle.                        Clinical Impression:   Final diagnoses:  [M10.9] Acute gout of left ankle, unspecified cause (Primary)        ED Disposition Condition    Discharge Stable          ED Prescriptions       Medication Sig Dispense Start Date End Date Auth. Provider    allopurinoL (ZYLOPRIM) 100 MG tablet Take 1 tablet (100 mg total) by mouth once daily. 90 tablet 9/24/2022 -- Fe Gross MD          Follow-up Information       Follow up With Specialties Details Why Contact Info    Florin Birmingham MD Family Medicine Schedule an appointment as soon as possible for a visit in 1 week  [address] [place] [id number] [phone]               Fe Gross MD  09/25/22 0154

## 2022-11-01 ENCOUNTER — HOSPITAL ENCOUNTER (EMERGENCY)
Facility: HOSPITAL | Age: 72
Discharge: HOME | End: 2022-11-01
Attending: STUDENT IN AN ORGANIZED HEALTH CARE EDUCATION/TRAINING PROGRAM
Payer: MEDICARE

## 2022-11-01 VITALS
TEMPERATURE: 98 F | HEART RATE: 100 BPM | BODY MASS INDEX: 29.8 KG/M2 | OXYGEN SATURATION: 99 % | DIASTOLIC BLOOD PRESSURE: 92 MMHG | RESPIRATION RATE: 20 BRPM | HEIGHT: 72 IN | WEIGHT: 220 LBS | SYSTOLIC BLOOD PRESSURE: 166 MMHG

## 2022-11-01 DIAGNOSIS — I10 UNCONTROLLED HYPERTENSION: ICD-10-CM

## 2022-11-01 DIAGNOSIS — M75.102 ROTATOR CUFF SYNDROME OF LEFT SHOULDER: Primary | ICD-10-CM

## 2022-11-01 DIAGNOSIS — R07.9 CHEST PAIN: ICD-10-CM

## 2022-11-01 DIAGNOSIS — R07.89 ATYPICAL CHEST PAIN: ICD-10-CM

## 2022-11-01 LAB
ALBUMIN SERPL-MCNC: 3.8 GM/DL (ref 3.4–4.8)
ALBUMIN/GLOB SERPL: 0.9 RATIO (ref 1.1–2)
ALP SERPL-CCNC: 92 UNIT/L (ref 40–150)
ALT SERPL-CCNC: 12 UNIT/L (ref 0–55)
AMPHET UR QL SCN: NEGATIVE
APPEARANCE UR: CLEAR
APTT PPP: 30.3 SECONDS (ref 23.4–33.9)
AST SERPL-CCNC: 14 UNIT/L (ref 5–34)
BACTERIA #/AREA URNS AUTO: ABNORMAL /HPF
BARBITURATE SCN PRESENT UR: NEGATIVE
BASOPHILS # BLD AUTO: 0.02 X10(3)/MCL (ref 0–0.2)
BASOPHILS NFR BLD AUTO: 0.2 %
BENZODIAZ UR QL SCN: NEGATIVE
BILIRUB UR QL STRIP.AUTO: NEGATIVE MG/DL
BILIRUBIN DIRECT+TOT PNL SERPL-MCNC: 0.4 MG/DL
BUN SERPL-MCNC: 14 MG/DL (ref 8.4–25.7)
CALCIUM SERPL-MCNC: 10 MG/DL (ref 8.8–10)
CANNABINOIDS UR QL SCN: NEGATIVE
CHLORIDE SERPL-SCNC: 106 MMOL/L (ref 98–107)
CO2 SERPL-SCNC: 25 MMOL/L (ref 23–31)
COCAINE UR QL SCN: NEGATIVE
COLOR UR AUTO: YELLOW
CREAT SERPL-MCNC: 1.09 MG/DL (ref 0.73–1.18)
EOSINOPHIL # BLD AUTO: 0.05 X10(3)/MCL (ref 0–0.9)
EOSINOPHIL NFR BLD AUTO: 0.6 %
ERYTHROCYTE [DISTWIDTH] IN BLOOD BY AUTOMATED COUNT: 14 % (ref 11.5–17)
ETHANOL SERPL-MCNC: <10 MG/DL
GFR SERPLBLD CREATININE-BSD FMLA CKD-EPI: >60 MLS/MIN/1.73/M2
GLOBULIN SER-MCNC: 4.2 GM/DL (ref 2.4–3.5)
GLUCOSE SERPL-MCNC: 102 MG/DL (ref 82–115)
GLUCOSE UR QL STRIP.AUTO: NEGATIVE MG/DL
HCT VFR BLD AUTO: 32.8 % (ref 42–52)
HGB BLD-MCNC: 10.4 GM/DL (ref 14–18)
IMM GRANULOCYTES # BLD AUTO: 0.03 X10(3)/MCL (ref 0–0.04)
IMM GRANULOCYTES NFR BLD AUTO: 0.4 %
INR BLD: 1.04 (ref 2–3)
KETONES UR QL STRIP.AUTO: NEGATIVE MG/DL
LEUKOCYTE ESTERASE UR QL STRIP.AUTO: NEGATIVE UNIT/L
LIPASE SERPL-CCNC: 20 U/L
LYMPHOCYTES # BLD AUTO: 0.52 X10(3)/MCL (ref 0.6–4.6)
LYMPHOCYTES NFR BLD AUTO: 6.4 %
MAGNESIUM SERPL-MCNC: 1.9 MG/DL (ref 1.6–2.6)
MCH RBC QN AUTO: 31.8 PG (ref 27–31)
MCHC RBC AUTO-ENTMCNC: 31.7 MG/DL (ref 33–36)
MCV RBC AUTO: 100.3 FL (ref 80–94)
MDMA UR QL SCN: NEGATIVE
MONOCYTES # BLD AUTO: 0.45 X10(3)/MCL (ref 0.1–1.3)
MONOCYTES NFR BLD AUTO: 5.6 %
NEUTROPHILS # BLD AUTO: 7 X10(3)/MCL (ref 2.1–9.2)
NEUTROPHILS NFR BLD AUTO: 86.8 %
NITRITE UR QL STRIP.AUTO: NEGATIVE
NRBC BLD AUTO-RTO: 0 %
OPIATES UR QL SCN: NEGATIVE
PCP UR QL: NEGATIVE
PH UR STRIP.AUTO: 5.5 [PH]
PH UR: 5.5 [PH] (ref 3–11)
PLATELET # BLD AUTO: 254 X10(3)/MCL (ref 130–400)
PMV BLD AUTO: 9.9 FL (ref 7.4–10.4)
POTASSIUM SERPL-SCNC: 4.6 MMOL/L (ref 3.5–5.1)
PROT SERPL-MCNC: 8 GM/DL (ref 5.8–7.6)
PROT UR QL STRIP.AUTO: NEGATIVE MG/DL
PROTHROMBIN TIME: 13.4 SECONDS (ref 11.7–14.5)
RBC # BLD AUTO: 3.27 X10(6)/MCL (ref 4.7–6.1)
RBC #/AREA URNS AUTO: ABNORMAL /HPF
RBC UR QL AUTO: ABNORMAL UNIT/L
SODIUM SERPL-SCNC: 140 MMOL/L (ref 136–145)
SP GR UR STRIP.AUTO: 1.02
SPECIFIC GRAVITY, URINE AUTO (.000) (OHS): 1.02 (ref 1–1.03)
SQUAMOUS #/AREA URNS AUTO: ABNORMAL /HPF
TROPONIN I SERPL-MCNC: <0.01 NG/ML (ref 0–0.04)
TSH SERPL-ACNC: 0.87 UIU/ML (ref 0.35–4.94)
UROBILINOGEN UR STRIP-ACNC: 0.2 MG/DL
WBC # SPEC AUTO: 8.1 X10(3)/MCL (ref 4.5–11.5)
WBC #/AREA URNS AUTO: ABNORMAL /HPF

## 2022-11-01 PROCEDURE — 83690 ASSAY OF LIPASE: CPT | Performed by: STUDENT IN AN ORGANIZED HEALTH CARE EDUCATION/TRAINING PROGRAM

## 2022-11-01 PROCEDURE — 25000003 PHARM REV CODE 250: Performed by: STUDENT IN AN ORGANIZED HEALTH CARE EDUCATION/TRAINING PROGRAM

## 2022-11-01 PROCEDURE — 99285 EMERGENCY DEPT VISIT HI MDM: CPT | Mod: 25

## 2022-11-01 PROCEDURE — 93010 ELECTROCARDIOGRAM REPORT: CPT | Mod: ,,, | Performed by: INTERNAL MEDICINE

## 2022-11-01 PROCEDURE — 80053 COMPREHEN METABOLIC PANEL: CPT | Performed by: STUDENT IN AN ORGANIZED HEALTH CARE EDUCATION/TRAINING PROGRAM

## 2022-11-01 PROCEDURE — 93010 EKG 12-LEAD: ICD-10-PCS | Mod: ,,, | Performed by: INTERNAL MEDICINE

## 2022-11-01 PROCEDURE — 85610 PROTHROMBIN TIME: CPT | Performed by: STUDENT IN AN ORGANIZED HEALTH CARE EDUCATION/TRAINING PROGRAM

## 2022-11-01 PROCEDURE — 83735 ASSAY OF MAGNESIUM: CPT | Performed by: STUDENT IN AN ORGANIZED HEALTH CARE EDUCATION/TRAINING PROGRAM

## 2022-11-01 PROCEDURE — 80307 DRUG TEST PRSMV CHEM ANLYZR: CPT | Performed by: STUDENT IN AN ORGANIZED HEALTH CARE EDUCATION/TRAINING PROGRAM

## 2022-11-01 PROCEDURE — 85025 COMPLETE CBC W/AUTO DIFF WBC: CPT | Performed by: STUDENT IN AN ORGANIZED HEALTH CARE EDUCATION/TRAINING PROGRAM

## 2022-11-01 PROCEDURE — 84484 ASSAY OF TROPONIN QUANT: CPT | Performed by: STUDENT IN AN ORGANIZED HEALTH CARE EDUCATION/TRAINING PROGRAM

## 2022-11-01 PROCEDURE — 85730 THROMBOPLASTIN TIME PARTIAL: CPT | Performed by: STUDENT IN AN ORGANIZED HEALTH CARE EDUCATION/TRAINING PROGRAM

## 2022-11-01 PROCEDURE — 82077 ASSAY SPEC XCP UR&BREATH IA: CPT | Performed by: STUDENT IN AN ORGANIZED HEALTH CARE EDUCATION/TRAINING PROGRAM

## 2022-11-01 PROCEDURE — 84443 ASSAY THYROID STIM HORMONE: CPT | Performed by: STUDENT IN AN ORGANIZED HEALTH CARE EDUCATION/TRAINING PROGRAM

## 2022-11-01 PROCEDURE — 36415 COLL VENOUS BLD VENIPUNCTURE: CPT | Performed by: STUDENT IN AN ORGANIZED HEALTH CARE EDUCATION/TRAINING PROGRAM

## 2022-11-01 PROCEDURE — 81001 URINALYSIS AUTO W/SCOPE: CPT | Performed by: STUDENT IN AN ORGANIZED HEALTH CARE EDUCATION/TRAINING PROGRAM

## 2022-11-01 PROCEDURE — 93005 ELECTROCARDIOGRAM TRACING: CPT

## 2022-11-01 RX ORDER — HYDROCODONE BITARTRATE AND ACETAMINOPHEN 5; 325 MG/1; MG/1
1 TABLET ORAL EVERY 6 HOURS PRN
Qty: 12 TABLET | Refills: 0 | Status: SHIPPED | OUTPATIENT
Start: 2022-11-01 | End: 2022-11-04

## 2022-11-01 RX ORDER — HYDROCODONE BITARTRATE AND ACETAMINOPHEN 5; 325 MG/1; MG/1
1 TABLET ORAL
Status: COMPLETED | OUTPATIENT
Start: 2022-11-01 | End: 2022-11-01

## 2022-11-01 RX ORDER — CLONIDINE HYDROCHLORIDE 0.1 MG/1
0.2 TABLET ORAL
Status: DISCONTINUED | OUTPATIENT
Start: 2022-11-01 | End: 2022-11-01

## 2022-11-01 RX ADMIN — HYDROCODONE BITARTRATE AND ACETAMINOPHEN 1 TABLET: 5; 325 TABLET ORAL at 01:11

## 2022-11-01 NOTE — ED PROVIDER NOTES
Encounter Date: 11/1/2022       History     Chief Complaint   Patient presents with    Chest Pain     Chest pain and left shoulder pain x couple days, non compliant with BP medication and lidocaine patches not helping the left shoulder pain, hurts worse with lifting the left arm      HPI    71-year-old male with a past medical history as delineated below although noncompliant with his medications presents emergency department for left shoulder pain.  Patient states he started having shoulder pain about 2-3 days ago.  States that he has significant pain with movement of his shoulder.  He notes that this morning he felt the pain was traveling into his chest so want to get checked out emergency department.  Patient states he flopped lidocaine patch to his left shoulder with improvement pain.  Denies any worsening pain with exertion.  States that if he touches his chest or shoulder the pain worsens.  Denies any shortness of breath.    Review of patient's allergies indicates:   Allergen Reactions    Fentanyl      Other reaction(s): unknown    Proparacaine     Tropicamide      Other reaction(s): disoriented     Past Medical History:   Diagnosis Date    Adjustment disorder     Bilateral carpal tunnel syndrome     CAD (coronary artery disease)     Cervical radiculopathy     Chronic headaches     Chronic pain syndrome     Gout     HTN (hypertension)     Lumbar radiculopathy     Migraine     Osteoarthritis     Pacemaker     Prostate cancer     Sleep apnea      Past Surgical History:   Procedure Laterality Date    APPENDECTOMY      CERVICAL FUSION      COLONOSCOPY N/A 08/25/2022    Procedure: COLONOSCOPY;  Surgeon: Luis Felipe Mckeon III, MD;  Location: [facility];  Service: Endoscopy;  Laterality: N/A;    EPIDURAL STEROID INJECTION INTO CERVICAL SPINE      EPIDURAL STEROID INJECTION INTO LUMBAR SPINE      Fluoroscopy of spinal cord      INJECTION OF JOINT Right 07/27/2022    Procedure: Injection, Joint, Hip;  Surgeon: Tono VALENTINE  MD Gagan;  Location: [facility];  Service: Orthopedics;  Laterality: Right;    INSERTION OF PACEMAKER      INSERTION OF PERMANENT PACEMAKER      INTRAOPERATIVE RADIATION THERAPY      LUNG REMOVAL, PARTIAL      Myelogram      Neck fusion      PENILE PROSTHESIS IMPLANT       Family History   Problem Relation Age of Onset    Heart disease Mother     Hypertension Mother     Heart disease Sister     Hypertension Sister     Heart disease Brother     Hypertension Brother      Social History     Tobacco Use    Smoking status: Never    Smokeless tobacco: Never   Substance Use Topics    Alcohol use: Not Currently     Comment: RARE    Drug use: Never     Review of Systems   Constitutional:  Negative for fever.   Respiratory:  Negative for cough.    Cardiovascular:  Positive for chest pain.   Gastrointestinal:  Negative for abdominal pain, constipation, diarrhea, nausea and vomiting.   Musculoskeletal:         Left shoulder pain   Skin:  Negative for rash.   Neurological:  Positive for headaches.   All other systems reviewed and are negative.    Physical Exam     Initial Vitals [11/01/22 1126]   BP Pulse Resp Temp SpO2   (!) 189/118 108 18 98.2 °F (36.8 °C) 99 %      MAP       --         Physical Exam    Nursing note and vitals reviewed.  Constitutional: He appears well-developed and well-nourished. No distress.   HENT:   Right Ear: External ear normal.   Left Ear: External ear normal.   Cardiovascular:  Normal rate, regular rhythm and intact distal pulses.           Pulmonary/Chest: Breath sounds normal. No respiratory distress. He has no wheezes. He has no rhonchi. He exhibits tenderness (reproducible tenderness to palpation to the left pectoral muscle which reproduces patient's complaint).   Abdominal: Abdomen is soft. Bowel sounds are normal. There is no abdominal tenderness.   Musculoskeletal:         General: Tenderness (generalized tenderness to palpation to the left shoulder) present. Normal range of motion.      Neurological: He is alert and oriented to person, place, and time. GCS score is 15. GCS eye subscore is 4. GCS verbal subscore is 5. GCS motor subscore is 6.   Skin: Skin is warm. Capillary refill takes less than 2 seconds.   Psychiatric: He has a normal mood and affect. Thought content normal.       ED Course   Procedures  Labs Reviewed   COMPREHENSIVE METABOLIC PANEL - Abnormal; Notable for the following components:       Result Value    Protein Total 8.0 (*)     Globulin 4.2 (*)     Albumin/Globulin Ratio 0.9 (*)     All other components within normal limits   URINALYSIS, REFLEX TO URINE CULTURE - Abnormal; Notable for the following components:    Blood, UA Large (*)     All other components within normal limits   PROTIME-INR - Abnormal; Notable for the following components:    INR 1.04 (*)     All other components within normal limits   CBC WITH DIFFERENTIAL - Abnormal; Notable for the following components:    RBC 3.27 (*)     Hgb 10.4 (*)     Hct 32.8 (*)     .3 (*)     MCH 31.8 (*)     MCHC 31.7 (*)     Lymph # 0.52 (*)     All other components within normal limits   URINALYSIS, MICROSCOPIC - Abnormal; Notable for the following components:    RBC, UA 6-10 (*)     All other components within normal limits   TROPONIN I - Normal   TSH - Normal   DRUG SCREEN, URINE (BEAKER) - Normal    Narrative:     Cut off concentrations:    Amphetamines - 1000 ng/ml  Barbiturates - 200 ng/ml  Benzodiazepine - 200 ng/ml  Cannabinoids (THC) - 50 ng/ml  Cocaine - 300 ng/ml  Fentanyl - 1.0 ng/ml  MDMA - 500 ng/ml  Opiates - 300 ng/ml   Phencyclidine (PCP) - 25 ng/ml    Specimen submitted for drug analysis and tested for pH and specific gravity in order to evaluate sample integrity. Suspect tampering if specific gravity is <1.003 and/or pH is not within the range of 4.5 - 8.0  False negatives may result form substances such as bleach added to urine.  False positives may result for the presence of a substance with similar  chemical structure to the drug or its metabolite.    This test provides only a PRELIMINARY analytical test result. A more specific alternate chemical method must be used in order to obtain a confirmed analytical result. Gas chromatography/mass spectrometry (GC/MS) is the preferred confirmatory method. Other chemical confirmation methods are available. Clinical consideration and professional judgement should be applied to any drug of abuse test result, particularly when preliminary positive results are used.    Positive results will be confirmed only at the physicians request. Unconfirmed screening results are to be used only for medical purposes (treatment).        ALCOHOL,MEDICAL (ETHANOL) - Normal   MAGNESIUM - Normal   LIPASE - Normal   APTT - Normal   CBC W/ AUTO DIFFERENTIAL    Narrative:     The following orders were created for panel order CBC auto differential.  Procedure                               Abnormality         Status                     ---------                               -----------         ------                     CBC with Differential[[id number]]        Abnormal            Final result                 Please view results for these tests on the individual orders.     EKG Readings: (Independently Interpreted)   Initial Reading: No STEMI. Rhythm: Sinus Tachycardia. Heart Rate: 103. Ectopy: No Ectopy. Conduction: 1st Degree AV Block. ST Segments: Normal ST Segments. T Waves: Normal. Clinical Impression: Sinus Tachycardia   ECG Results              EKG 12-lead (In process)  Result time 11/01/22 11:31:52      In process by Interface, Lab In [facility] (11/01/22 11:31:52)                   Narrative:    Test Reason : R07.9,    Vent. Rate : 103 BPM     Atrial Rate : 103 BPM     P-R Int : 216 ms          QRS Dur : 096 ms      QT Int : 346 ms       P-R-T Axes : 048 002 034 degrees     QTc Int : 453 ms    Sinus tachycardia with 1st degree A-V block  Otherwise normal ECG  When compared with ECG of  24-AUG-2022 08:27,  Sinus rhythm has replaced Electronic atrial pacemaker  Vent. rate has increased BY  43 BPM  Questionable change in The axis  Non-specific change in ST segment in Inferior leads  QT has lengthened    Referred By: [email or web address]   SELF           Confirmed By:                                   Imaging Results              X-Ray Shoulder 2 or More Views Left (Final result)  Result time 11/01/22 12:35:15      Final result by Britton Morales MD (11/01/22 12:35:15)                   Impression:      No acute osseous process identified.      Electronically signed by: Britton Morales  Date:    11/01/2022  Time:    12:35               Narrative:    EXAMINATION:  XR SHOULDER COMPLETE 2 OR MORE VIEWS LEFT    CLINICAL HISTORY:  left shoulder pain;    TECHNIQUE:  Two or three views of the left shoulder.    COMPARISON:  None    FINDINGS:  No acute fracture identified.  Glenohumeral and AC joints are aligned with underlying degenerative changes.  Humeral head is mildly high-riding, possibly chronic rotator cuff injury.                                       X-Ray Chest 1 View (Final result)  Result time 11/01/22 12:18:22      Final result by Blanca Salcedo MD (11/01/22 12:18:22)                   Impression:      No acute abnormality of the chest.      Electronically signed by: Blanca Salcedo  Date:    11/01/2022  Time:    12:18               Narrative:    EXAMINATION:  XR CHEST 1 VIEW    CLINICAL HISTORY:  Chest pain, unspecified    TECHNIQUE:  AP chest    COMPARISON:  Chest x-ray dated 01/01/2021    FINDINGS:  Left chest wall pacemaker is in place.  The heart is stable in size.  There is no focal airspace consolidation.  There is no pleural effusion or visible pneumothorax.                                       Medications   HYDROcodone-acetaminophen 5-325 mg per tablet 1 tablet (1 tablet Oral Given 11/1/22 0147)     Medical Decision Making:   Differential Diagnosis:   Left shoulder pain, osteoarthritis,  muscle strain, ACS           ED Course as of 11/01/22 1339   Tue Nov 01, 2022   1332 Blood pressure improved.  160s over 80s. [BS]   1335 Troponins negative.  Pain reproducible with left shoulder movement.  Likely rotator cuff syndrome.  Will give him a Norco and he will follow-up with his PCP.  Will given pain medicine her next few days.  ER precautions given.  [BS]      ED Course User Index  [BS] Rickie Billy MD                 Clinical Impression:   Final diagnoses:  [R07.9] Chest pain  [M75.102] Rotator cuff syndrome of left shoulder (Primary)  [R07.89] Atypical chest pain  [I10] Uncontrolled hypertension        ED Disposition Condition    Discharge Stable          ED Prescriptions       Medication Sig Dispense Start Date End Date Auth. Provider    HYDROcodone-acetaminophen (NORCO) 5-325 mg per tablet Take 1 tablet by mouth every 6 (six) hours as needed for Pain. 12 tablet 11/1/2022 11/4/2022 Rickie Billy MD          Follow-up Information       Follow up With Specialties Details Why Contact Info    Florin Birmingham MD Family Medicine Call   [address] [address] [phone]      [facility] - Emergency Dept Emergency Medicine Go to  If symptoms worsen [id number]  [facility] [address] [phone]    [facility] Orthopedics Call   [address] [facility] [address] [phone]             Rickie Billy MD  11/01/22 9533

## 2023-02-13 ENCOUNTER — HOSPITAL ENCOUNTER (EMERGENCY)
Facility: HOSPITAL | Age: 73
Discharge: HOME | End: 2023-02-13
Attending: STUDENT IN AN ORGANIZED HEALTH CARE EDUCATION/TRAINING PROGRAM
Payer: MEDICARE

## 2023-02-13 VITALS
SYSTOLIC BLOOD PRESSURE: 118 MMHG | TEMPERATURE: 98 F | HEART RATE: 84 BPM | WEIGHT: 220 LBS | DIASTOLIC BLOOD PRESSURE: 84 MMHG | OXYGEN SATURATION: 98 % | RESPIRATION RATE: 18 BRPM | HEIGHT: 72 IN | BODY MASS INDEX: 29.8 KG/M2

## 2023-02-13 DIAGNOSIS — M79.642 LEFT HAND PAIN: Primary | ICD-10-CM

## 2023-02-13 DIAGNOSIS — M10.9 ACUTE GOUT OF LEFT HAND, UNSPECIFIED CAUSE: ICD-10-CM

## 2023-02-13 PROCEDURE — 63600175 PHARM REV CODE 636 W HCPCS: Performed by: NURSE PRACTITIONER

## 2023-02-13 PROCEDURE — 96372 THER/PROPH/DIAG INJ SC/IM: CPT | Performed by: NURSE PRACTITIONER

## 2023-02-13 PROCEDURE — 99284 EMERGENCY DEPT VISIT MOD MDM: CPT

## 2023-02-13 RX ORDER — COLCHICINE 0.6 MG/1
0.6 TABLET ORAL 2 TIMES DAILY PRN
Qty: 30 TABLET | Refills: 0 | Status: SHIPPED | OUTPATIENT
Start: 2023-02-13 | End: 2023-03-21

## 2023-02-13 RX ORDER — HYDROCODONE BITARTRATE AND ACETAMINOPHEN 10; 325 MG/1; MG/1
1 TABLET ORAL EVERY 6 HOURS PRN
Qty: 12 TABLET | Refills: 0 | Status: ON HOLD | OUTPATIENT
Start: 2023-02-13 | End: 2024-02-01

## 2023-02-13 RX ORDER — PREDNISONE 20 MG/1
20 TABLET ORAL DAILY
Qty: 5 TABLET | Refills: 0 | Status: SHIPPED | OUTPATIENT
Start: 2023-02-13 | End: 2023-02-18

## 2023-02-13 RX ORDER — DEXAMETHASONE SODIUM PHOSPHATE 4 MG/ML
8 INJECTION, SOLUTION INTRA-ARTICULAR; INTRALESIONAL; INTRAMUSCULAR; INTRAVENOUS; SOFT TISSUE
Status: COMPLETED | OUTPATIENT
Start: 2023-02-13 | End: 2023-02-13

## 2023-02-13 RX ORDER — ALLOPURINOL 100 MG/1
100 TABLET ORAL DAILY
Qty: 15 TABLET | Refills: 0 | Status: SHIPPED | OUTPATIENT
Start: 2023-02-13 | End: 2023-02-28

## 2023-02-13 RX ADMIN — DEXAMETHASONE SODIUM PHOSPHATE 8 MG: 4 INJECTION, SOLUTION INTRA-ARTICULAR; INTRALESIONAL; INTRAMUSCULAR; INTRAVENOUS; SOFT TISSUE at 12:02

## 2023-02-13 NOTE — ED PROVIDER NOTES
Encounter Date: 2/13/2023       History     Chief Complaint   Patient presents with    Hand Pain     Pt to er c/o pain to left hand onset 4 days ago, states feels like his gout.     Patient states dorsal left hand pain x 4 days. Denies any injury or trauma. States that he has a history of gout and that this feels like a flare-up of gout. States that pain worsens with movement and palpation.     The history is provided by the patient.   Hand Pain  This is a recurrent problem. The current episode started more than 2 days ago. Episode frequency: Intermittently. The problem has not changed since onset.Pertinent negatives include no chest pain, no abdominal pain, no headaches and no shortness of breath. Exacerbated by: Movement.   Review of patient's allergies indicates:   Allergen Reactions    Fentanyl      Other reaction(s): unknown    Proparacaine     Tropicamide      Other reaction(s): disoriented     Past Medical History:   Diagnosis Date    Adjustment disorder     Bilateral carpal tunnel syndrome     CAD (coronary artery disease)     Cervical radiculopathy     Chronic headaches     Chronic pain syndrome     Gout     HTN (hypertension)     Lumbar radiculopathy     Migraine     Osteoarthritis     Pacemaker     Prostate cancer     Sleep apnea      Past Surgical History:   Procedure Laterality Date    APPENDECTOMY      CERVICAL FUSION      COLONOSCOPY N/A 08/25/2022    Procedure: COLONOSCOPY;  Surgeon: Luis Felipe Mckeon III, MD;  Location: [facility];  Service: Endoscopy;  Laterality: N/A;    EPIDURAL STEROID INJECTION INTO CERVICAL SPINE      EPIDURAL STEROID INJECTION INTO LUMBAR SPINE      Fluoroscopy of spinal cord      INJECTION OF JOINT Right 07/27/2022    Procedure: Injection, Joint, Hip;  Surgeon: Tono Farah MD;  Location: [facility];  Service: Orthopedics;  Laterality: Right;    INSERTION OF PACEMAKER      INSERTION OF PERMANENT PACEMAKER      INTRAOPERATIVE RADIATION THERAPY      LUNG REMOVAL, PARTIAL       Myelogram      Neck fusion      PENILE PROSTHESIS IMPLANT       Family History   Problem Relation Age of Onset    Heart disease Mother     Hypertension Mother     Heart disease Sister     Hypertension Sister     Heart disease Brother     Hypertension Brother      Social History     Tobacco Use    Smoking status: Never    Smokeless tobacco: Never   Substance Use Topics    Alcohol use: Not Currently     Comment: RARE    Drug use: Never     Review of Systems   Constitutional: Negative.  Negative for chills and fever.   HENT: Negative.     Eyes: Negative.    Respiratory: Negative.  Negative for shortness of breath.    Cardiovascular: Negative.  Negative for chest pain.   Gastrointestinal: Negative.  Negative for abdominal pain.   Endocrine: Negative.    Genitourinary: Negative.    Musculoskeletal: Negative.         Hand Pain   Skin: Negative.  Negative for rash.   Allergic/Immunologic: Negative.    Neurological: Negative.  Negative for weakness, numbness and headaches.   Hematological: Negative.    Psychiatric/Behavioral: Negative.     All other systems reviewed and are negative.    Physical Exam     Initial Vitals [02/13/23 1003]   BP Pulse Resp Temp SpO2   135/86 106 20 97.7 °F (36.5 °C) 98 %      MAP       --         Physical Exam    Nursing note and vitals reviewed.  Constitutional: He appears well-developed and well-nourished. No distress.   HENT:   Head: Normocephalic and atraumatic.   Mouth/Throat: Oropharynx is clear and moist.   Eyes: Conjunctivae and EOM are normal. Pupils are equal, round, and reactive to light.   Neck: Neck supple.   Normal range of motion.  Cardiovascular:  Normal rate, regular rhythm, normal heart sounds and intact distal pulses.           Pulses:       Radial pulses are 2+ on the right side and 2+ on the left side.   Pulmonary/Chest: Breath sounds normal. No respiratory distress.   Abdominal: Abdomen is soft. He exhibits no distension. There is no abdominal tenderness.   Musculoskeletal:          General: No edema. Normal range of motion.      Right hand: Normal.      Left hand: Swelling (Mild swelling to dorsal left hand. There is no erythema or area of fluctuance noted.) and tenderness present. No deformity. Normal range of motion. Normal strength. Normal sensation. Normal capillary refill.      Cervical back: Normal range of motion and neck supple.     Neurological: He is alert and oriented to person, place, and time. He has normal strength. GCS score is 15. GCS eye subscore is 4. GCS verbal subscore is 5. GCS motor subscore is 6.   Skin: Skin is warm and dry. No rash noted.   Psychiatric: He has a normal mood and affect. Thought content normal.       ED Course   Procedures  Labs Reviewed - No data to display       Imaging Results              X-Ray Hand 3 View Left (In process)                      Medications   dexAMETHasone injection 8 mg (8 mg Intramuscular Given 2/13/23 9564)     Medical Decision Making:   Initial Assessment:   Patient states dorsal left hand pain x 4 days. Denies any injury or trauma. States that he has a history of gout and that this feels like a flare-up of gout. States that pain worsens with movement and palpation.     The history is provided by the patient.   Hand Pain  This is a recurrent problem. The current episode started more than 2 days ago. Episode frequency: Intermittently. The problem has not changed since onset.Pertinent negatives include no chest pain, no abdominal pain, no headaches and no shortness of breath. Exacerbated by: Movement.     Patient is awake, alert, afebrile, neurovascularly intact, and nontoxic appearing in the ED.   Differential Diagnosis:   Gout, Arthritis, Sprain, Strain.   Clinical Tests:   Radiological Study: Ordered and Reviewed  ED Management:  Patient with a history of HTN and Gout. States that left hand pain feels like his usual gout pain. States that he did eat beef and shellfish recently. X-ray of left hand does not show any acute  abnormalities. Patient was given a steroid injection in the ED and will discharge with pain control and have patient f/u with his PCP. Patient was given ED return precautions.                         Clinical Impression:   Final diagnoses:  [M79.642] Left hand pain (Primary)  [M10.9] Acute gout of left hand, unspecified cause        ED Disposition Condition    Discharge Stable          ED Prescriptions       Medication Sig Dispense Start Date End Date Auth. Provider    allopurinoL (ZYLOPRIM) 100 MG tablet Take 1 tablet (100 mg total) by mouth once daily. for 15 doses 15 tablet 2/13/2023 2/28/2023 YA Lin    colchicine (COLCRYS) 0.6 mg tablet Take 1 tablet (0.6 mg total) by mouth 2 (two) times daily as needed (Gout Pain). 30 tablet 2/13/2023 3/15/2023 YA Lin    predniSONE (DELTASONE) 20 MG tablet Take 1 tablet (20 mg total) by mouth once daily. for 5 days 5 tablet 2/13/2023 2/18/2023 YA Lin    HYDROcodone-acetaminophen (NORCO)  mg per tablet Take 1 tablet by mouth every 6 (six) hours as needed for Pain. 12 tablet 2/13/2023 -- YA Lin          Follow-up Information       Follow up With Specialties Details Why Contact Info    Florin Birmingham MD Family Medicine In 3 days  [address] [facility] [address] [phone]               YA Lin  02/13/23 4320

## 2023-02-28 ENCOUNTER — OFFICE VISIT (OUTPATIENT)
Dept: ORTHOPEDICS | Facility: CLINIC | Age: 73
End: 2023-02-28
Payer: MEDICARE

## 2023-02-28 VITALS
HEIGHT: 72 IN | DIASTOLIC BLOOD PRESSURE: 69 MMHG | BODY MASS INDEX: 29.8 KG/M2 | HEART RATE: 86 BPM | WEIGHT: 220 LBS | SYSTOLIC BLOOD PRESSURE: 97 MMHG

## 2023-02-28 DIAGNOSIS — M17.11 PRIMARY OSTEOARTHRITIS OF RIGHT KNEE: Primary | ICD-10-CM

## 2023-02-28 DIAGNOSIS — M16.11 PRIMARY OSTEOARTHRITIS OF RIGHT HIP: ICD-10-CM

## 2023-02-28 PROCEDURE — 99214 OFFICE O/P EST MOD 30 MIN: CPT | Mod: 25,,, | Performed by: ORTHOPAEDIC SURGERY

## 2023-02-28 PROCEDURE — 20610 LARGE JOINT ASPIRATION/INJECTION: R KNEE: ICD-10-PCS | Mod: RT,,, | Performed by: ORTHOPAEDIC SURGERY

## 2023-02-28 PROCEDURE — 20610 DRAIN/INJ JOINT/BURSA W/O US: CPT | Mod: RT,,, | Performed by: ORTHOPAEDIC SURGERY

## 2023-02-28 PROCEDURE — 99214 PR OFFICE/OUTPT VISIT, EST, LEVL IV, 30-39 MIN: ICD-10-PCS | Mod: 25,,, | Performed by: ORTHOPAEDIC SURGERY

## 2023-02-28 RX ORDER — LIDOCAINE HYDROCHLORIDE 20 MG/ML
2 INJECTION, SOLUTION INFILTRATION; PERINEURAL
Status: DISCONTINUED | OUTPATIENT
Start: 2023-02-28 | End: 2023-02-28 | Stop reason: HOSPADM

## 2023-02-28 RX ORDER — BETAMETHASONE SODIUM PHOSPHATE AND BETAMETHASONE ACETATE 3; 3 MG/ML; MG/ML
6 INJECTION, SUSPENSION INTRA-ARTICULAR; INTRALESIONAL; INTRAMUSCULAR; SOFT TISSUE
Status: DISCONTINUED | OUTPATIENT
Start: 2023-02-28 | End: 2023-02-28 | Stop reason: HOSPADM

## 2023-02-28 RX ADMIN — LIDOCAINE HYDROCHLORIDE 2 MG: 20 INJECTION, SOLUTION INFILTRATION; PERINEURAL at 03:02

## 2023-02-28 RX ADMIN — BETAMETHASONE SODIUM PHOSPHATE AND BETAMETHASONE ACETATE 6 MG: 3; 3 INJECTION, SUSPENSION INTRA-ARTICULAR; INTRALESIONAL; INTRAMUSCULAR; SOFT TISSUE at 03:02

## 2023-02-28 NOTE — PROGRESS NOTES
History of present illness:    This is a 72 y.o. year old male that presents today with right hip and right knee pain.  He has a previous history of right knee osteoarthritis that has been treated conservatively in the past actually would like to receive a right knee steroid injection possibly a right hip steroid injection.  He has previous x-rays of the last year that demonstrate right knee and right hip osteoarthritis.    Past Medical History:   Diagnosis Date    Adjustment disorder     Bilateral carpal tunnel syndrome     CAD (coronary artery disease)     Cervical radiculopathy     Chronic headaches     Chronic pain syndrome     Gout     HTN (hypertension)     Lumbar radiculopathy     Migraine     Osteoarthritis     Pacemaker     Prostate cancer     Sleep apnea        Past Surgical History:   Procedure Laterality Date    APPENDECTOMY      CERVICAL FUSION      COLONOSCOPY N/A 08/25/2022    Procedure: COLONOSCOPY;  Surgeon: Luis Felipe Mckeon III, MD;  Location: [facility];  Service: Endoscopy;  Laterality: N/A;    EPIDURAL STEROID INJECTION INTO CERVICAL SPINE      EPIDURAL STEROID INJECTION INTO LUMBAR SPINE      Fluoroscopy of spinal cord      INJECTION OF JOINT Right 07/27/2022    Procedure: Injection, Joint, Hip;  Surgeon: Tono Farah MD;  Location: [facility];  Service: Orthopedics;  Laterality: Right;    INSERTION OF PACEMAKER      INSERTION OF PERMANENT PACEMAKER      INTRAOPERATIVE RADIATION THERAPY      LUNG REMOVAL, PARTIAL      Myelogram      Neck fusion      PENILE PROSTHESIS IMPLANT         Current Outpatient Medications   Medication Sig    albuterol (PROVENTIL/VENTOLIN HFA) 90 mcg/actuation inhaler Inhale 2 puffs into the lungs every 6 (six) hours as needed.    allopurinoL (ZYLOPRIM) 100 MG tablet Take 1 tablet (100 mg total) by mouth once daily. for 15 doses    aspirin (ECOTRIN) 81 MG EC tablet Take 81 mg by mouth every evening. Stopped x 2 weeks    atorvastatin (LIPITOR) 40 MG tablet Take 40 mg by  mouth every evening.    bicalutamide (CASODEX) 50 MG Tab Take 50 mg by mouth every evening.    cetirizine (ZYRTEC) 10 MG tablet Take 10 mg by mouth every evening.    colchicine (COLCRYS) 0.6 mg tablet Take 1 tablet (0.6 mg total) by mouth 2 (two) times daily as needed (Gout Pain).    diclofenac (VOLTAREN) 75 MG EC tablet Take 75 mg by mouth 2 (two) times daily as needed.    dorzolamide-timolol 2-0.5% (COSOPT) 22.3-6.8 mg/mL ophthalmic solution Place 1 drop into both eyes every 12 (twelve) hours as needed.    ferrous gluconate (FERGON) 324 MG tablet Take 1 tablet by mouth 2 (two) times daily.    fluticasone furoate-vilanteroL (BREO) 200-25 mcg/dose DsDv diskus inhaler Inhale 1 puff into the lungs every morning.    fluticasone propionate (FLONASE) 50 mcg/actuation nasal spray 1 spray by Each Nostril route daily as needed.    gabapentin (NEURONTIN) 300 MG capsule TAKE TWO CAPSULES BY MOUTH THREE TIMES A DAY    HYDROcodone-acetaminophen (NORCO)  mg per tablet Take 1 tablet by mouth every 6 (six) hours as needed for Pain.    isosorbide mononitrate (IMDUR) 30 MG 24 hr tablet Take 30 mg by mouth every evening.    lactulose (CHRONULAC) 10 gram/15 mL solution Take 30 g by mouth 2 (two) times daily as needed.    leuprolide acetate (LUPRON DEPOT IM) Inject into the muscle.    lisinopril (PRINIVIL,ZESTRIL) 20 MG tablet Take 20 mg by mouth every evening.    meloxicam (MOBIC) 15 MG tablet Take 15 mg by mouth every evening.    metoprolol succinate (TOPROL-XL) 25 MG 24 hr tablet Take 25 mg by mouth every evening.    multivitamin capsule Take 1 capsule by mouth every morning.    MYRBETRIQ 25 mg Tb24 ER tablet Take 25 mg by mouth 2 (two) times a day.    nitroGLYCERIN (NITROSTAT) 0.4 MG SL tablet Place 0.4 mg under the tongue every 5 (five) minutes as needed.    pantoprazole (PROTONIX) 40 MG tablet Take 40 mg by mouth daily as needed.    potassium bicarbonate disintegrating tablet Take by mouth 2 (two) times daily.    potassium  citrate (UROCIT-K 15) 15 mEq TbSR Take 1 tablet by mouth 2 (two) times daily.    sertraline (ZOLOFT) 50 MG tablet TAKE ONE TABLET BY MOUTH AT BEDTIME    tamsulosin (FLOMAX) 0.4 mg Cap Take 0.4 mg by mouth every morning.    tiZANidine (ZANAFLEX) 4 MG tablet TAKE ONE TABLET BY MOUTH EVERY 8 HOURS AS NEEDED FOR PAIN AND/OR SPASMS    topiramate (TOPAMAX) 25 MG tablet Take 25 mg by mouth every morning.    triamcinolone acetonide 0.1% (KENALOG) 0.1 % ointment Apply 1 application topically 2 (two) times daily as needed.     No current facility-administered medications for this visit.     Facility-Administered Medications Ordered in Other Visits   Medication    lactated ringers infusion    LIDOcaine (PF) 10 mg/ml (1%) injection 10 mg       Review of patient's allergies indicates:   Allergen Reactions    Fentanyl      Other reaction(s): unknown    Proparacaine     Tropicamide      Other reaction(s): disoriented       Family History   Problem Relation Age of Onset    Heart disease Mother     Hypertension Mother     Heart disease Sister     Hypertension Sister     Heart disease Brother     Hypertension Brother        Social History     Socioeconomic History    Marital status:    Tobacco Use    Smoking status: Never    Smokeless tobacco: Never   Substance and Sexual Activity    Alcohol use: Not Currently     Comment: RARE    Drug use: Never    Sexual activity: Yes       Chief Complaint:   Chief Complaint   Patient presents with    Right Knee - Pain     Right knee pain and requesting a cortisone injection today.       Consulting Physician: No ref. provider found      Review of Systems:    All review of systems negative except for those stated in the HPI    Examination:    Vital Signs:    Vitals:    02/28/23 1544   BP: 97/69   Pulse: 86   Weight: 99.8 kg (220 lb)   Height: 6' (1.829 m)       Body mass index is 29.84 kg/m².    Physical Exam:   General: Well-developed, well-nourished.  Neuro: Alert and oriented x 3.  Psych:  Normal mood and affect.    Knee Exam:  Varus deformity. Range of motion from 5-110 degrees. Negative patella grind and equal subluxation of knee cap medial and lateral < 1cm. Negative patella tendon tenderness. Negative Lachman and anterior drawer test. Negative posterior drawer test. Negative varus and valgus stress test. Positive medial joint line tenderness. Negative lateral joint line tenderness. 4/5 strength and normal skin appearance. Sensibility normal.    Hip Exam:  No obvious deformity. Functional range of motion. Negative MARC test. Negative FADDIR test. No flexion contracture. Negative greater trochanteric tenderness. Negative MONET test. 4/5 strength, normal skin appearance and palpable pulses distally. Sensibility normal.         Assessment: Primary osteoarthritis of right knee    Primary osteoarthritis of right hip  -     Large Joint Aspiration/Injection: R knee        Plan:    We will give this patient a right knee steroid injection today.  I will refer him to see Dr. Farah for a possible right hip steroid injection.  He will come back to see me as needed.      Large Joint Aspiration/Injection: R knee    Date/Time: 2/28/2023 3:30 PM  Performed by: Teofilo Eden MD  Authorized by: Teofilo Eden MD     Consent Done?:  Yes (Verbal)  Indications:  Arthritis and pain  Timeout: prior to procedure the correct patient, procedure, and site was verified    Prep: patient was prepped and draped in usual sterile fashion      Details:  Needle Size:  25 G  Approach:  Anterolateral  Location:  Knee  Site:  R knee  Medications:  2 mg LIDOcaine HCL 20 mg/ml (2%) 20 mg/mL (2 %); 6 mg betamethasone acetate-betamethasone sodium phosphate 6 mg/mL  Patient tolerance:  Patient tolerated the procedure well with no immediate complications     DISCLAIMER: This note may have been dictated using voice recognition software and may contain grammatical errors.     NOTE: Consult report sent to referring provider via [employer].

## 2023-02-28 NOTE — PROCEDURES
Large Joint Aspiration/Injection: R knee    Date/Time: 2/28/2023 3:30 PM  Performed by: Teofilo Eden MD  Authorized by: Teofilo Eden MD     Consent Done?:  Yes (Verbal)  Indications:  Arthritis and pain  Timeout: prior to procedure the correct patient, procedure, and site was verified    Prep: patient was prepped and draped in usual sterile fashion      Details:  Needle Size:  25 G  Approach:  Anterolateral  Location:  Knee  Site:  R knee  Medications:  2 mg LIDOcaine HCL 20 mg/ml (2%) 20 mg/mL (2 %); 6 mg betamethasone acetate-betamethasone sodium phosphate 6 mg/mL  Patient tolerance:  Patient tolerated the procedure well with no immediate complications

## 2023-03-10 ENCOUNTER — HOSPITAL ENCOUNTER (OUTPATIENT)
Dept: RADIOLOGY | Facility: CLINIC | Age: 73
Discharge: HOME | End: 2023-03-10
Attending: ORTHOPAEDIC SURGERY
Payer: MEDICARE

## 2023-03-10 ENCOUNTER — OFFICE VISIT (OUTPATIENT)
Dept: ORTHOPEDICS | Facility: CLINIC | Age: 73
End: 2023-03-10
Payer: MEDICARE

## 2023-03-10 VITALS
SYSTOLIC BLOOD PRESSURE: 140 MMHG | HEART RATE: 60 BPM | WEIGHT: 220 LBS | HEIGHT: 72 IN | BODY MASS INDEX: 29.8 KG/M2 | DIASTOLIC BLOOD PRESSURE: 82 MMHG

## 2023-03-10 DIAGNOSIS — M16.11 PRIMARY OSTEOARTHRITIS OF RIGHT HIP: Primary | ICD-10-CM

## 2023-03-10 DIAGNOSIS — M16.11 PRIMARY OSTEOARTHRITIS OF RIGHT HIP: ICD-10-CM

## 2023-03-10 PROCEDURE — 73502 X-RAY EXAM HIP UNI 2-3 VIEWS: CPT | Mod: RT,,, | Performed by: ORTHOPAEDIC SURGERY

## 2023-03-10 PROCEDURE — 99213 OFFICE O/P EST LOW 20 MIN: CPT | Mod: ,,, | Performed by: ORTHOPAEDIC SURGERY

## 2023-03-10 PROCEDURE — 73502 XR HIP WITH PELVIS WHEN PERFORMED, 2 OR 3  VIEWS RIGHT: ICD-10-PCS | Mod: RT,,, | Performed by: ORTHOPAEDIC SURGERY

## 2023-03-10 PROCEDURE — 99213 PR OFFICE/OUTPT VISIT, EST, LEVL III, 20-29 MIN: ICD-10-PCS | Mod: ,,, | Performed by: ORTHOPAEDIC SURGERY

## 2023-03-10 RX ORDER — MELOXICAM 15 MG/1
15 TABLET ORAL DAILY
Qty: 30 TABLET | Refills: 2 | Status: ON HOLD | OUTPATIENT
Start: 2023-03-10 | End: 2023-03-29 | Stop reason: HOSPADM

## 2023-03-10 RX ORDER — SODIUM CHLORIDE 9 MG/ML
INJECTION, SOLUTION INTRAVENOUS CONTINUOUS
Status: CANCELLED | OUTPATIENT
Start: 2023-03-10

## 2023-03-10 RX ORDER — MUPIROCIN 20 MG/G
OINTMENT TOPICAL
Status: CANCELLED | OUTPATIENT
Start: 2023-03-10

## 2023-03-10 NOTE — PROGRESS NOTES
Chief Complaint:   Chief Complaint   Patient presents with    Right Hip - Pain     Pt states his rt hip is hurting and is taking norco for the pain but it is not helping too much.        History of present illness:  62-year-old male presents for follow-up of right hip osteoarthritis.  Patient has significant severe pain in the right hip.  No significant groin pain with range of motion.  Pain with ambulation.  Patient has tried anti-inflammatories.  Does have a history of an injection in the past with relief.  Like to discuss further treatment    Past Medical History:   Diagnosis Date    Adjustment disorder     Bilateral carpal tunnel syndrome     CAD (coronary artery disease)     Cervical radiculopathy     Chronic headaches     Chronic pain syndrome     Gout     HTN (hypertension)     Lumbar radiculopathy     Migraine     Osteoarthritis     Pacemaker     Prostate cancer     Sleep apnea        Past Surgical History:   Procedure Laterality Date    APPENDECTOMY      CERVICAL FUSION      COLONOSCOPY N/A 08/25/2022    Procedure: COLONOSCOPY;  Surgeon: Luis Felipe Mckeon III, MD;  Location: [facility];  Service: Endoscopy;  Laterality: N/A;    EPIDURAL STEROID INJECTION INTO CERVICAL SPINE      EPIDURAL STEROID INJECTION INTO LUMBAR SPINE      Fluoroscopy of spinal cord      INJECTION OF JOINT Right 07/27/2022    Procedure: Injection, Joint, Hip;  Surgeon: Tono Farah MD;  Location: [facility];  Service: Orthopedics;  Laterality: Right;    INSERTION OF PACEMAKER      INSERTION OF PERMANENT PACEMAKER      INTRAOPERATIVE RADIATION THERAPY      LUNG REMOVAL, PARTIAL      Myelogram      Neck fusion      PENILE PROSTHESIS IMPLANT         Current Outpatient Medications   Medication Sig    albuterol (PROVENTIL/VENTOLIN HFA) 90 mcg/actuation inhaler Inhale 2 puffs into the lungs every 6 (six) hours as needed.    aspirin (ECOTRIN) 81 MG EC tablet Take 81 mg by mouth every evening. Stopped x 2 weeks    atorvastatin (LIPITOR) 40  MG tablet Take 40 mg by mouth every evening.    bicalutamide (CASODEX) 50 MG Tab Take 50 mg by mouth every evening.    cetirizine (ZYRTEC) 10 MG tablet Take 10 mg by mouth every evening.    colchicine (COLCRYS) 0.6 mg tablet Take 1 tablet (0.6 mg total) by mouth 2 (two) times daily as needed (Gout Pain).    diclofenac (VOLTAREN) 75 MG EC tablet Take 75 mg by mouth 2 (two) times daily as needed.    dorzolamide-timolol 2-0.5% (COSOPT) 22.3-6.8 mg/mL ophthalmic solution Place 1 drop into both eyes every 12 (twelve) hours as needed.    ferrous gluconate (FERGON) 324 MG tablet Take 1 tablet by mouth 2 (two) times daily.    fluticasone furoate-vilanteroL (BREO) 200-25 mcg/dose DsDv diskus inhaler Inhale 1 puff into the lungs every morning.    fluticasone propionate (FLONASE) 50 mcg/actuation nasal spray 1 spray by Each Nostril route daily as needed.    gabapentin (NEURONTIN) 300 MG capsule TAKE TWO CAPSULES BY MOUTH THREE TIMES A DAY    HYDROcodone-acetaminophen (NORCO)  mg per tablet Take 1 tablet by mouth every 6 (six) hours as needed for Pain.    isosorbide mononitrate (IMDUR) 30 MG 24 hr tablet Take 30 mg by mouth every evening.    lactulose (CHRONULAC) 10 gram/15 mL solution Take 30 g by mouth 2 (two) times daily as needed.    leuprolide acetate (LUPRON DEPOT IM) Inject into the muscle.    lisinopril (PRINIVIL,ZESTRIL) 20 MG tablet Take 20 mg by mouth every evening.    meloxicam (MOBIC) 15 MG tablet Take 15 mg by mouth every evening.    metoprolol succinate (TOPROL-XL) 25 MG 24 hr tablet Take 25 mg by mouth every evening.    multivitamin capsule Take 1 capsule by mouth every morning.    MYRBETRIQ 25 mg Tb24 ER tablet Take 25 mg by mouth 2 (two) times a day.    nitroGLYCERIN (NITROSTAT) 0.4 MG SL tablet Place 0.4 mg under the tongue every 5 (five) minutes as needed.    pantoprazole (PROTONIX) 40 MG tablet Take 40 mg by mouth daily as needed.    potassium bicarbonate disintegrating tablet Take by mouth 2 (two)  times daily.    sertraline (ZOLOFT) 50 MG tablet TAKE ONE TABLET BY MOUTH AT BEDTIME    tamsulosin (FLOMAX) 0.4 mg Cap Take 0.4 mg by mouth every morning.    tiZANidine (ZANAFLEX) 4 MG tablet TAKE ONE TABLET BY MOUTH EVERY 8 HOURS AS NEEDED FOR PAIN AND/OR SPASMS    topiramate (TOPAMAX) 25 MG tablet Take 25 mg by mouth every morning.    triamcinolone acetonide 0.1% (KENALOG) 0.1 % ointment Apply 1 application topically 2 (two) times daily as needed.    meloxicam (MOBIC) 15 MG tablet Take 1 tablet (15 mg total) by mouth once daily.    potassium citrate (UROCIT-K 15) 15 mEq TbSR Take 1 tablet by mouth 2 (two) times daily.     No current facility-administered medications for this visit.     Facility-Administered Medications Ordered in Other Visits   Medication    lactated ringers infusion    LIDOcaine (PF) 10 mg/ml (1%) injection 10 mg       Review of patient's allergies indicates:   Allergen Reactions    Fentanyl      Other reaction(s): unknown    Proparacaine     Tropicamide      Other reaction(s): disoriented       Family History   Problem Relation Age of Onset    Heart disease Mother     Hypertension Mother     Heart disease Sister     Hypertension Sister     Heart disease Brother     Hypertension Brother        Social History     Socioeconomic History    Marital status:    Tobacco Use    Smoking status: Never    Smokeless tobacco: Never   Substance and Sexual Activity    Alcohol use: Not Currently     Comment: RARE    Drug use: Never    Sexual activity: Yes           Review of Systems:    Constitution: Negative for chills, fever, and sweats.  Negative for unexplained weight loss.    HENT:  Negative for headaches and blurry vision.    Cardiovascular:Negative for chest pain or irregular heart beat. Negative for hypertension.    Respiratory:  Negative for cough and shortness of breath.    Gastrointestinal: Negative for abdominal pain, heartburn, melena, nausea, and vomitting.    Genitourinary:  Negative  bladder incontinence and dysuria.    Musculoskeletal:  See HPI    Neurological: Negative for numbness.    Psychiatric/Behavioral: Negative for depression.  The patient is not nervous/anxious.      Endocrine: Negative for polyuria    Hematologic/Lymphatic: Negative for bleeding problem.  Does not bruise/bleed easily.    Skin: Negative for poor would healing and rash      Physical Examination:    Vital Signs:    Vitals:    03/10/23 1012   BP: (!) 140/82   Pulse: 60       Body mass index is 29.84 kg/m².    Options.  General: No acute distress, alert and oriented, healthy appearing    HEENT: Head is atraumatic, mucous membranes are moist    Neck: Supples, no JVD    Cardiovascular: Palpable dorsalis pedis and posterior tibial pulses, regular rate and rhythm to those pulses    Lungs: Breathing non-labored    Skin: no rashes appreciated    Neurologic: Can flex and extend knees, ankles, and toes. Sensation is grossly intact      Right hip:  Range of motion of the right hip with significant groin pain.  Brisk capillary refill distally.  Sensation intact distally.    X-rays:  Three views right reviewed.  Patient with end-stage arthritis of the right hip with complete loss of joint space and bone-on-bone articulation.     Assessment::  Right hip osteoarthritis    Plan:  Discussed all treatment options the patient.  Patient with end-stage arthritis of the right hip.  He would like to try another injection as he is done well with this in the past.  We will get him set up for a cortisone injection of the right hip and she is response.    This note was created using [employer] voice recognition software that occasionally misinterpreted phrases or words.    Consult note is delivered via Epic messaging service.

## 2023-03-10 NOTE — H&P (VIEW-ONLY)
Chief Complaint:   Chief Complaint   Patient presents with    Right Hip - Pain     Pt states his rt hip is hurting and is taking norco for the pain but it is not helping too much.        History of present illness:  62-year-old male presents for follow-up of right hip osteoarthritis.  Patient has significant severe pain in the right hip.  No significant groin pain with range of motion.  Pain with ambulation.  Patient has tried anti-inflammatories.  Does have a history of an injection in the past with relief.  Like to discuss further treatment    Past Medical History:   Diagnosis Date    Adjustment disorder     Bilateral carpal tunnel syndrome     CAD (coronary artery disease)     Cervical radiculopathy     Chronic headaches     Chronic pain syndrome     Gout     HTN (hypertension)     Lumbar radiculopathy     Migraine     Osteoarthritis     Pacemaker     Prostate cancer     Sleep apnea        Past Surgical History:   Procedure Laterality Date    APPENDECTOMY      CERVICAL FUSION      COLONOSCOPY N/A 08/25/2022    Procedure: COLONOSCOPY;  Surgeon: Luis Felipe Mckeon III, MD;  Location: [facility];  Service: Endoscopy;  Laterality: N/A;    EPIDURAL STEROID INJECTION INTO CERVICAL SPINE      EPIDURAL STEROID INJECTION INTO LUMBAR SPINE      Fluoroscopy of spinal cord      INJECTION OF JOINT Right 07/27/2022    Procedure: Injection, Joint, Hip;  Surgeon: Tono Farah MD;  Location: [facility];  Service: Orthopedics;  Laterality: Right;    INSERTION OF PACEMAKER      INSERTION OF PERMANENT PACEMAKER      INTRAOPERATIVE RADIATION THERAPY      LUNG REMOVAL, PARTIAL      Myelogram      Neck fusion      PENILE PROSTHESIS IMPLANT         Current Outpatient Medications   Medication Sig    albuterol (PROVENTIL/VENTOLIN HFA) 90 mcg/actuation inhaler Inhale 2 puffs into the lungs every 6 (six) hours as needed.    aspirin (ECOTRIN) 81 MG EC tablet Take 81 mg by mouth every evening. Stopped x 2 weeks    atorvastatin (LIPITOR) 40  MG tablet Take 40 mg by mouth every evening.    bicalutamide (CASODEX) 50 MG Tab Take 50 mg by mouth every evening.    cetirizine (ZYRTEC) 10 MG tablet Take 10 mg by mouth every evening.    colchicine (COLCRYS) 0.6 mg tablet Take 1 tablet (0.6 mg total) by mouth 2 (two) times daily as needed (Gout Pain).    diclofenac (VOLTAREN) 75 MG EC tablet Take 75 mg by mouth 2 (two) times daily as needed.    dorzolamide-timolol 2-0.5% (COSOPT) 22.3-6.8 mg/mL ophthalmic solution Place 1 drop into both eyes every 12 (twelve) hours as needed.    ferrous gluconate (FERGON) 324 MG tablet Take 1 tablet by mouth 2 (two) times daily.    fluticasone furoate-vilanteroL (BREO) 200-25 mcg/dose DsDv diskus inhaler Inhale 1 puff into the lungs every morning.    fluticasone propionate (FLONASE) 50 mcg/actuation nasal spray 1 spray by Each Nostril route daily as needed.    gabapentin (NEURONTIN) 300 MG capsule TAKE TWO CAPSULES BY MOUTH THREE TIMES A DAY    HYDROcodone-acetaminophen (NORCO)  mg per tablet Take 1 tablet by mouth every 6 (six) hours as needed for Pain.    isosorbide mononitrate (IMDUR) 30 MG 24 hr tablet Take 30 mg by mouth every evening.    lactulose (CHRONULAC) 10 gram/15 mL solution Take 30 g by mouth 2 (two) times daily as needed.    leuprolide acetate (LUPRON DEPOT IM) Inject into the muscle.    lisinopril (PRINIVIL,ZESTRIL) 20 MG tablet Take 20 mg by mouth every evening.    meloxicam (MOBIC) 15 MG tablet Take 15 mg by mouth every evening.    metoprolol succinate (TOPROL-XL) 25 MG 24 hr tablet Take 25 mg by mouth every evening.    multivitamin capsule Take 1 capsule by mouth every morning.    MYRBETRIQ 25 mg Tb24 ER tablet Take 25 mg by mouth 2 (two) times a day.    nitroGLYCERIN (NITROSTAT) 0.4 MG SL tablet Place 0.4 mg under the tongue every 5 (five) minutes as needed.    pantoprazole (PROTONIX) 40 MG tablet Take 40 mg by mouth daily as needed.    potassium bicarbonate disintegrating tablet Take by mouth 2 (two)  times daily.    sertraline (ZOLOFT) 50 MG tablet TAKE ONE TABLET BY MOUTH AT BEDTIME    tamsulosin (FLOMAX) 0.4 mg Cap Take 0.4 mg by mouth every morning.    tiZANidine (ZANAFLEX) 4 MG tablet TAKE ONE TABLET BY MOUTH EVERY 8 HOURS AS NEEDED FOR PAIN AND/OR SPASMS    topiramate (TOPAMAX) 25 MG tablet Take 25 mg by mouth every morning.    triamcinolone acetonide 0.1% (KENALOG) 0.1 % ointment Apply 1 application topically 2 (two) times daily as needed.    meloxicam (MOBIC) 15 MG tablet Take 1 tablet (15 mg total) by mouth once daily.    potassium citrate (UROCIT-K 15) 15 mEq TbSR Take 1 tablet by mouth 2 (two) times daily.     No current facility-administered medications for this visit.     Facility-Administered Medications Ordered in Other Visits   Medication    lactated ringers infusion    LIDOcaine (PF) 10 mg/ml (1%) injection 10 mg       Review of patient's allergies indicates:   Allergen Reactions    Fentanyl      Other reaction(s): unknown    Proparacaine     Tropicamide      Other reaction(s): disoriented       Family History   Problem Relation Age of Onset    Heart disease Mother     Hypertension Mother     Heart disease Sister     Hypertension Sister     Heart disease Brother     Hypertension Brother        Social History     Socioeconomic History    Marital status:    Tobacco Use    Smoking status: Never    Smokeless tobacco: Never   Substance and Sexual Activity    Alcohol use: Not Currently     Comment: RARE    Drug use: Never    Sexual activity: Yes           Review of Systems:    Constitution: Negative for chills, fever, and sweats.  Negative for unexplained weight loss.    HENT:  Negative for headaches and blurry vision.    Cardiovascular:Negative for chest pain or irregular heart beat. Negative for hypertension.    Respiratory:  Negative for cough and shortness of breath.    Gastrointestinal: Negative for abdominal pain, heartburn, melena, nausea, and vomitting.    Genitourinary:  Negative  bladder incontinence and dysuria.    Musculoskeletal:  See HPI    Neurological: Negative for numbness.    Psychiatric/Behavioral: Negative for depression.  The patient is not nervous/anxious.      Endocrine: Negative for polyuria    Hematologic/Lymphatic: Negative for bleeding problem.  Does not bruise/bleed easily.    Skin: Negative for poor would healing and rash      Physical Examination:    Vital Signs:    Vitals:    03/10/23 1012   BP: (!) 140/82   Pulse: 60       Body mass index is 29.84 kg/m².    Options.  General: No acute distress, alert and oriented, healthy appearing    HEENT: Head is atraumatic, mucous membranes are moist    Neck: Supples, no JVD    Cardiovascular: Palpable dorsalis pedis and posterior tibial pulses, regular rate and rhythm to those pulses    Lungs: Breathing non-labored    Skin: no rashes appreciated    Neurologic: Can flex and extend knees, ankles, and toes. Sensation is grossly intact      Right hip:  Range of motion of the right hip with significant groin pain.  Brisk capillary refill distally.  Sensation intact distally.    X-rays:  Three views right reviewed.  Patient with end-stage arthritis of the right hip with complete loss of joint space and bone-on-bone articulation.     Assessment::  Right hip osteoarthritis    Plan:  Discussed all treatment options the patient.  Patient with end-stage arthritis of the right hip.  He would like to try another injection as he is done well with this in the past.  We will get him set up for a cortisone injection of the right hip and she is response.    This note was created using [employer] voice recognition software that occasionally misinterpreted phrases or words.    Consult note is delivered via Epic messaging service.

## 2023-03-24 ENCOUNTER — ANESTHESIA EVENT (OUTPATIENT)
Dept: SURGERY | Facility: HOSPITAL | Age: 73
End: 2023-03-24
Payer: MEDICARE

## 2023-03-28 RX ORDER — SODIUM CHLORIDE, SODIUM GLUCONATE, SODIUM ACETATE, POTASSIUM CHLORIDE AND MAGNESIUM CHLORIDE 30; 37; 368; 526; 502 MG/100ML; MG/100ML; MG/100ML; MG/100ML; MG/100ML
INJECTION, SOLUTION INTRAVENOUS CONTINUOUS
Status: CANCELLED | OUTPATIENT
Start: 2023-03-28 | End: 2023-04-27

## 2023-03-28 RX ORDER — ONDANSETRON 2 MG/ML
4 INJECTION INTRAMUSCULAR; INTRAVENOUS DAILY PRN
Status: CANCELLED | OUTPATIENT
Start: 2023-03-28

## 2023-03-28 NOTE — ANESTHESIA PREPROCEDURE EVALUATION
Fentanyl allergy documented on chart                                                                                                         03/28/2023  Rene Baer is a 72 y.o., male with ----------------------------  Adjustment disorder  Bilateral carpal tunnel syndrome  CAD (coronary artery disease)  Cervical radiculopathy  Chronic headaches  Chronic pain syndrome  Gout  HTN (hypertension)  Lumbar radiculopathy  Migraine  Osteoarthritis  Pacemaker  Prostate cancer  Sleep apnea    And ----------------------------  Appendectomy  Cervical fusion  Colonoscopy      Comment:  Procedure: COLONOSCOPY;  Surgeon: Luis Felipe Mckeon III, MD;  Location: [facility];  Service: Endoscopy;                 Laterality: N/A;  Epidural steroid injection into cervical spine  Epidural steroid injection into lumbar spine  Fluoroscopy of spinal cord  Injection of joint      Comment:  Procedure: Injection, Joint, Hip;  Surgeon: Tono Farah MD;  Location: [facility];  Service: Orthopedics;                Laterality: Right;  Insertion of permanent pacemaker  Intraoperative radiation therapy  Lung removal, partial  Myelogram  Penile prosthesis implant    Presents for right hip injection     Previous injection in July 2022  .      Pre-op Assessment    I have reviewed the NPO Status.      Review of Systems      Physical Exam  General: Well nourished, Cooperative, Alert and Oriented    Airway:  Mallampati: II   Mouth Opening: Normal  TM Distance: Normal  Tongue: Normal  Neck ROM: Normal ROM    Chest/Lungs:  Clear to auscultation, Normal Respiratory Rate    Heart:  Rate: Normal  Rhythm: Regular Rhythm        Anesthesia Plan  Type of Anesthesia, risks & benefits discussed:    Anesthesia Type: Gen Natural Airway  Intra-op Monitoring Plan: Standard ASA Monitors  Post Op Pain Control Plan: IV/PO Opioids PRN  Induction:  IV  Airway Plan: Direct  Informed Consent: Informed consent signed with the Patient and all  parties understand the risks and agree with anesthesia plan.  All questions answered. Patient consented to blood products? No  ASA Score: 3  Day of Surgery Review of History & Physical: H&P Update referred to the surgeon/provider.  Anesthesia Plan Notes: Nasal cannula vs facemask supplemental oxygenation   For patients with JAVIER/obesity, may consider SuperNoval Nasal CPAP      Ready For Surgery From Anesthesia Perspective.     .

## 2023-03-29 ENCOUNTER — ANESTHESIA (OUTPATIENT)
Dept: SURGERY | Facility: HOSPITAL | Age: 73
End: 2023-03-29
Payer: MEDICARE

## 2023-03-29 ENCOUNTER — HOSPITAL ENCOUNTER (OUTPATIENT)
Facility: HOSPITAL | Age: 73
Discharge: HOME | End: 2023-03-29
Attending: ORTHOPAEDIC SURGERY | Admitting: ORTHOPAEDIC SURGERY
Payer: MEDICARE

## 2023-03-29 DIAGNOSIS — M16.11 PRIMARY OSTEOARTHRITIS OF RIGHT HIP: ICD-10-CM

## 2023-03-29 PROCEDURE — 71000015 HC POSTOP RECOV 1ST HR: Performed by: ORTHOPAEDIC SURGERY

## 2023-03-29 PROCEDURE — 63600175 PHARM REV CODE 636 W HCPCS: Performed by: ORTHOPAEDIC SURGERY

## 2023-03-29 PROCEDURE — 37000008 HC ANESTHESIA 1ST 15 MINUTES: Performed by: ORTHOPAEDIC SURGERY

## 2023-03-29 PROCEDURE — 20610 DRAIN/INJ JOINT/BURSA W/O US: CPT | Mod: RT,,, | Performed by: ORTHOPAEDIC SURGERY

## 2023-03-29 PROCEDURE — 25000003 PHARM REV CODE 250: Performed by: ORTHOPAEDIC SURGERY

## 2023-03-29 PROCEDURE — 63600175 PHARM REV CODE 636 W HCPCS: Performed by: NURSE ANESTHETIST, CERTIFIED REGISTERED

## 2023-03-29 PROCEDURE — 25000003 PHARM REV CODE 250: Performed by: NURSE ANESTHETIST, CERTIFIED REGISTERED

## 2023-03-29 PROCEDURE — 20610 PR DRAIN/INJECT LARGE JOINT/BURSA: ICD-10-PCS | Mod: RT,,, | Performed by: ORTHOPAEDIC SURGERY

## 2023-03-29 PROCEDURE — 36000704 HC OR TIME LEV I 1ST 15 MIN: Performed by: ORTHOPAEDIC SURGERY

## 2023-03-29 PROCEDURE — 77002 NEEDLE LOCALIZATION BY XRAY: CPT | Mod: 26,,, | Performed by: ORTHOPAEDIC SURGERY

## 2023-03-29 PROCEDURE — 77002 PR FLUOROSCOPIC GUIDANCE NEEDLE PLACEMENT: ICD-10-PCS | Mod: 26,,, | Performed by: ORTHOPAEDIC SURGERY

## 2023-03-29 RX ORDER — LIDOCAINE HYDROCHLORIDE 10 MG/ML
INJECTION, SOLUTION EPIDURAL; INFILTRATION; INTRACAUDAL; PERINEURAL
Status: DISCONTINUED | OUTPATIENT
Start: 2023-03-29 | End: 2023-03-29

## 2023-03-29 RX ORDER — BETAMETHASONE SODIUM PHOSPHATE AND BETAMETHASONE ACETATE 3; 3 MG/ML; MG/ML
INJECTION, SUSPENSION INTRA-ARTICULAR; INTRALESIONAL; INTRAMUSCULAR; SOFT TISSUE
Status: DISCONTINUED
Start: 2023-03-29 | End: 2023-03-29 | Stop reason: HOSPADM

## 2023-03-29 RX ORDER — SODIUM CHLORIDE, SODIUM LACTATE, POTASSIUM CHLORIDE, CALCIUM CHLORIDE 600; 310; 30; 20 MG/100ML; MG/100ML; MG/100ML; MG/100ML
INJECTION, SOLUTION INTRAVENOUS CONTINUOUS
Status: ACTIVE | OUTPATIENT
Start: 2023-03-29

## 2023-03-29 RX ORDER — BETAMETHASONE SODIUM PHOSPHATE AND BETAMETHASONE ACETATE 3; 3 MG/ML; MG/ML
INJECTION, SUSPENSION INTRA-ARTICULAR; INTRALESIONAL; INTRAMUSCULAR; SOFT TISSUE
Status: DISCONTINUED | OUTPATIENT
Start: 2023-03-29 | End: 2023-03-29 | Stop reason: HOSPADM

## 2023-03-29 RX ORDER — PROPOFOL 10 MG/ML
INJECTION, EMULSION INTRAVENOUS
Status: DISCONTINUED | OUTPATIENT
Start: 2023-03-29 | End: 2023-03-29

## 2023-03-29 RX ORDER — SODIUM CHLORIDE, SODIUM GLUCONATE, SODIUM ACETATE, POTASSIUM CHLORIDE AND MAGNESIUM CHLORIDE 30; 37; 368; 526; 502 MG/100ML; MG/100ML; MG/100ML; MG/100ML; MG/100ML
INJECTION, SOLUTION INTRAVENOUS CONTINUOUS
Status: ACTIVE | OUTPATIENT
Start: 2023-03-29 | End: 2023-04-28

## 2023-03-29 RX ORDER — LIDOCAINE HYDROCHLORIDE 10 MG/ML
INJECTION INFILTRATION; PERINEURAL
Status: DISCONTINUED | OUTPATIENT
Start: 2023-03-29 | End: 2023-03-29 | Stop reason: HOSPADM

## 2023-03-29 RX ORDER — LIDOCAINE HYDROCHLORIDE 10 MG/ML
INJECTION INFILTRATION; PERINEURAL
Status: DISCONTINUED
Start: 2023-03-29 | End: 2023-03-29 | Stop reason: HOSPADM

## 2023-03-29 RX ADMIN — PROPOFOL 20 MG: 10 INJECTION, EMULSION INTRAVENOUS at 08:03

## 2023-03-29 RX ADMIN — SODIUM CHLORIDE, SODIUM GLUCONATE, SODIUM ACETATE, POTASSIUM CHLORIDE AND MAGNESIUM CHLORIDE: 526; 502; 368; 37; 30 INJECTION, SOLUTION INTRAVENOUS at 08:03

## 2023-03-29 RX ADMIN — LIDOCAINE HYDROCHLORIDE 50 MG: 10 INJECTION, SOLUTION EPIDURAL; INFILTRATION; INTRACAUDAL; PERINEURAL at 08:03

## 2023-03-29 RX ADMIN — PROPOFOL 50 MG: 10 INJECTION, EMULSION INTRAVENOUS at 08:03

## 2023-03-29 NOTE — DISCHARGE SUMMARY
[facility] Orthopa[facility]ics - Periop Services  Discharge Note  Short Stay    Procedure(s) (LRB):  R hip injection (Right)      OUTCOME: Patient tolerated treatment/procedure well without complication and is now ready for discharge.    DISPOSITION: Home or Self Care    FINAL DIAGNOSIS:  Primary osteoarthritis of right hip    FOLLOWUP: In clinic    DISCHARGE INSTRUCTIONS:  No discharge procedures on file.     TIME SPENT ON DISCHARGE: 5 minutes

## 2023-03-29 NOTE — DISCHARGE INSTRUCTIONS
Please call your DR. If you have problems with pain, bleeding, any signs of infection such as fever 102 or greater, procedure area redness, swelling, drainage, hard or hot to touch or anything of concern. Keep dressing clean, dry and intact till tomorrow evening then remove dressing. You can gently wash area in shower at that time but do not submerge.  No powders, lotions or creams to incisions. Keep extremity elevated to help with pain and swelling. Use ice 20-30 minute periods with 10 minute breaks  for 2 days to help with pain and swelling and that as needed. No driving or legal decisions for 24 hours. Clear liquid diet and advance to regular as tolerated.  Light activity.

## 2023-03-29 NOTE — ANESTHESIA POSTPROCEDURE EVALUATION
Anesthesia Post Evaluation    Patient: Rene Baer    Procedure(s) Performed: Procedure(s) (LRB):  R hip injection (Right)    Final Anesthesia Type: general      Patient location during evaluation: PACU  Patient participation: Yes- Able to Participate  Level of consciousness: awake and alert  Post-procedure vital signs: reviewed and stable  Pain management: adequate  Airway patency: patent    PONV status at discharge: No PONV  Anesthetic complications: no      Cardiovascular status: hemodynamically stable  Respiratory status: unassisted  Hydration status: euvolemic  Follow-up not needed.          Vitals Value Taken Time   /75 03/29/23 0918   Temp 36.2 °C (97.2 °F) 03/29/23 0901   Pulse 60 03/29/23 0931   Resp 20 03/29/23 0915   SpO2 100 % 03/29/23 0931   Vitals shown include unvalidated device data.      No case tracking events are documented in the log.      Pain/Jarad Score: Jarad Score: 9 (3/29/2023  9:02 AM)  Modified Jarad Score: 17 (3/29/2023  9:02 AM)

## 2023-03-29 NOTE — TRANSFER OF CARE
Anesthesia Transfer of Care Note    Patient: Rene Baer    Procedure(s) Performed: Procedure(s) (LRB):  R hip injection (Right)    Patient location: OPS    Anesthesia Type: general    Transport from OR: Transported from OR on room air with adequate spontaneous ventilation    Post pain: adequate analgesia    Post assessment: no apparent anesthetic complications and tolerated procedure well    Post vital signs: stable    Level of consciousness: awake, alert and oriented    Nausea/Vomiting: no nausea/vomiting    Complications: none    Transfer of care protocol was followed      Last vitals:   Visit Vitals  BP (!) 142/80   Pulse 60   Temp 36.1 °C (97 °F)   Resp 18   Ht 6' (1.829 m)   Wt 99 kg (218 lb 4.1 oz)   SpO2 100%   BMI 29.60 kg/m²

## 2023-03-29 NOTE — OP NOTE
Date of Procedure: 3/29/2023    Procedure: R ACETABULOFEMORAL JOINT INJECTION (HIP)/arthrogram    Provider: Tono Farah MD    Pre-Operative Diagnosis: Primary osteoarthritis of R hip     Post-Operative Diagnosis: as above    Anesthesia: General/MAC    Description of the Findings of the Procedure:     The patient was brought to the procedure room.  IV access was obtained prior to the procedure.  The patient was positioned on the fluoroscopy table.  Sedation was administered by anesthesia.  The skin overlying the hip was prepped and draped in a sterile fashion.  The skin and subcutaneous tissue was anesthetized using 1-2 cc of lidocaine 2%.  An 18 gauge, spinal needle was slowly advanced through under fluoroscopic guidance into the acetabulofemoral joint. The needle position was confirmed using oblique, AP and lateral fluoroscopic imaging.  2 cc of Omnipaque 300 was injected confirming intra-articular contrast spread. A combination of 12 mg betamethasone and 2 cc 2% lidocaine was easily injected. The needle was removed and band-aid placed.  A sterile dressing was applied. No specimens collected. Rene was taken to the Post-block Recovery Area for further observation. And discharged home when appropriate.    Complications: No    Estimated Blood Loss (EBL): Minimal           Specimens: none           Condition: Good    Disposition: PACU - hemodynamically stable.      Fluoroscopic guidance was used for needle localization.  Images were saved and stored for documentation.  The hip structures were identified and visualized.  Dynamic visualization of the 18g x 3.5 cm needle was continuous throughout the procedure and maintained in good position.

## 2023-03-30 VITALS
SYSTOLIC BLOOD PRESSURE: 136 MMHG | WEIGHT: 218.25 LBS | TEMPERATURE: 97 F | OXYGEN SATURATION: 100 % | BODY MASS INDEX: 29.56 KG/M2 | RESPIRATION RATE: 20 BRPM | DIASTOLIC BLOOD PRESSURE: 75 MMHG | HEART RATE: 56 BPM | HEIGHT: 72 IN

## 2023-07-18 ENCOUNTER — HOSPITAL ENCOUNTER (EMERGENCY)
Facility: HOSPITAL | Age: 73
Discharge: HOME | End: 2023-07-18
Attending: EMERGENCY MEDICINE
Payer: MEDICARE

## 2023-07-18 VITALS
SYSTOLIC BLOOD PRESSURE: 113 MMHG | HEIGHT: 72 IN | HEART RATE: 100 BPM | WEIGHT: 215 LBS | OXYGEN SATURATION: 98 % | BODY MASS INDEX: 29.12 KG/M2 | RESPIRATION RATE: 16 BRPM | TEMPERATURE: 99 F | DIASTOLIC BLOOD PRESSURE: 80 MMHG

## 2023-07-18 DIAGNOSIS — M25.569 KNEE PAIN: ICD-10-CM

## 2023-07-18 PROCEDURE — 96372 THER/PROPH/DIAG INJ SC/IM: CPT

## 2023-07-18 PROCEDURE — 25000003 PHARM REV CODE 250

## 2023-07-18 PROCEDURE — 99284 EMERGENCY DEPT VISIT MOD MDM: CPT

## 2023-07-18 PROCEDURE — 63600175 PHARM REV CODE 636 W HCPCS

## 2023-07-18 RX ORDER — PREDNISONE 20 MG/1
20 TABLET ORAL DAILY
Qty: 5 TABLET | Refills: 0 | Status: SHIPPED | OUTPATIENT
Start: 2023-07-18 | End: 2023-07-23

## 2023-07-18 RX ORDER — COLCHICINE 0.6 MG/1
0.6 TABLET ORAL 2 TIMES DAILY PRN
Qty: 10 TABLET | Refills: 0 | Status: SHIPPED | OUTPATIENT
Start: 2023-07-18

## 2023-07-18 RX ORDER — HYDROCODONE BITARTRATE AND ACETAMINOPHEN 10; 325 MG/1; MG/1
1 TABLET ORAL
Status: COMPLETED | OUTPATIENT
Start: 2023-07-18 | End: 2023-07-18

## 2023-07-18 RX ORDER — DEXAMETHASONE SODIUM PHOSPHATE 4 MG/ML
8 INJECTION, SOLUTION INTRA-ARTICULAR; INTRALESIONAL; INTRAMUSCULAR; INTRAVENOUS; SOFT TISSUE
Status: COMPLETED | OUTPATIENT
Start: 2023-07-18 | End: 2023-07-18

## 2023-07-18 RX ADMIN — DEXAMETHASONE SODIUM PHOSPHATE 8 MG: 4 INJECTION, SOLUTION INTRA-ARTICULAR; INTRALESIONAL; INTRAMUSCULAR; INTRAVENOUS; SOFT TISSUE at 12:07

## 2023-07-18 RX ADMIN — HYDROCODONE BITARTRATE AND ACETAMINOPHEN 1 TABLET: 10; 325 TABLET ORAL at 12:07

## 2023-07-18 NOTE — ED TRIAGE NOTES
C/o right knee pain and swelling s/t swollen knee joint . States he has had to have this drained before in ED. Denies recent trauma

## 2023-07-18 NOTE — ED PROVIDER NOTES
Encounter Date: 7/18/2023       History     Chief Complaint   Patient presents with    Joint Swelling     C/o right knee pain and swelling s/t swollen knee joint . States he has had to have this drained before in ED. Denies recent trauma     72 y.o.  male with a history of hypertension, CKD, and gout presents to Emergency Department with a chief complaint of atraumatic R knee pain. Symptoms began several days ago and have been constant since onset. Patient reports he has not been compliant with his gout medications. Associated symptoms include R knee swelling. Symptoms are aggravated with palpation and exertion and there are no alleviating factors. The patient denies CP, SOB, recent injury, fever, chills, or abdominal pain. He reports taking Norco, Gabapentin, and Zanaflex prior to arrival with no relief of symptoms. No other reported symptoms at this time      The history is provided by the patient and the spouse. No  was used.   General Illness   The current episode started several days ago. The problem occurs continuously. The problem has been unchanged. Pertinent negatives include no fever, no photophobia, no abdominal pain, no nausea, no vomiting, no stridor, no shortness of breath, no URI and no wheezing. He has received no recent medical care.   Review of patient's allergies indicates:   Allergen Reactions    Proparacaine     Tropicamide      Other reaction(s): disoriented     Past Medical History:   Diagnosis Date    Adjustment disorder     Bilateral carpal tunnel syndrome     CAD (coronary artery disease)     Cervical radiculopathy     Chronic headaches     Chronic pain syndrome     CKD (chronic kidney disease)     Gout     HTN (hypertension)     Lumbar radiculopathy     Migraine     Osteoarthritis     Pacemaker     Prostate cancer     Sleep apnea      Past Surgical History:   Procedure Laterality Date    APPENDECTOMY      ARTHROCENTESIS OF HIP JOINT Right 3/29/2023     Procedure: R hip injection;  Surgeon: Tono Farah MD;  Location: [facility] OR;  Service: Orthopedics;  Laterality: Right;    CERVICAL FUSION      COLONOSCOPY N/A 08/25/2022    Procedure: COLONOSCOPY;  Surgeon: Luis Felipe Mckeon III, MD;  Location: [facility];  Service: Endoscopy;  Laterality: N/A;    EPIDURAL STEROID INJECTION INTO CERVICAL SPINE      EPIDURAL STEROID INJECTION INTO LUMBAR SPINE      Fluoroscopy of spinal cord      INJECTION OF JOINT Right 07/27/2022    Procedure: Injection, Joint, Hip;  Surgeon: Tono Farah MD;  Location: [facility] OR;  Service: Orthopedics;  Laterality: Right;    INSERTION OF PERMANENT PACEMAKER      INTRAOPERATIVE RADIATION THERAPY      LUNG REMOVAL, PARTIAL      Myelogram      PENILE PROSTHESIS IMPLANT       Family History   Problem Relation Age of Onset    Heart disease Mother     Hypertension Mother     Heart disease Sister     Hypertension Sister     Heart disease Brother     Hypertension Brother      Social History     Tobacco Use    Smoking status: Never    Smokeless tobacco: Never   Substance Use Topics    Alcohol use: Yes     Alcohol/week: 1.0 standard drink     Types: 1 Shots of liquor per week     Comment: RARE    Drug use: Never     Review of Systems   Constitutional:  Negative for chills, fatigue and fever.   Eyes:  Negative for photophobia and visual disturbance.   Respiratory:  Negative for shortness of breath, wheezing and stridor.    Cardiovascular:  Negative for chest pain, palpitations and leg swelling.   Gastrointestinal:  Negative for abdominal pain, nausea and vomiting.   Musculoskeletal:  Positive for arthralgias and joint swelling. Negative for back pain and gait problem.   Skin:  Negative for color change and wound.   All other systems reviewed and are negative.    Physical Exam     Initial Vitals [07/18/23 1143]   BP Pulse Resp Temp SpO2   113/80 100 18 98.6 °F (37 °C) 98 %      MAP       --         Physical Exam    Nursing note and vitals  reviewed.  Constitutional: He appears well-developed and well-nourished. He is not diaphoretic. He is cooperative.  Non-toxic appearance. No distress.   HENT:   Head: Normocephalic and atraumatic.   Right Ear: External ear normal.   Left Ear: External ear normal.   Nose: Nose normal.   Mouth/Throat: Oropharynx is clear and moist.   Eyes: Conjunctivae and EOM are normal. Pupils are equal, round, and reactive to light.   Neck: Neck supple.   Normal range of motion.  Cardiovascular:  Normal rate, regular rhythm, S1 normal, S2 normal, normal heart sounds, intact distal pulses and normal pulses.           Pulmonary/Chest: Effort normal and breath sounds normal. No respiratory distress. He has no wheezes. He exhibits no tenderness.   Abdominal: Abdomen is soft. Bowel sounds are normal. He exhibits no distension. There is no abdominal tenderness. There is no guarding.   Musculoskeletal:         General: Tenderness present. Normal range of motion.      Cervical back: Normal range of motion and neck supple.      Right knee: Swelling present. No erythema. Normal range of motion. Tenderness present. Normal pulse.      Left knee: Normal.      Comments: Tenderness and mild swelling noted to R knee. Full 5/5 ROM noted. CMS intact. All other adjacent joints otherwise normal.        Neurological: He is alert and oriented to person, place, and time. He has normal strength. No sensory deficit. GCS score is 15. GCS eye subscore is 4. GCS verbal subscore is 5. GCS motor subscore is 6.   Skin: Skin is warm and dry. Capillary refill takes less than 2 seconds. No rash noted. No erythema.   Psychiatric: He has a normal mood and affect. Thought content normal.       ED Course   Procedures  Labs Reviewed - No data to display       Imaging Results    None          Medications   dexAMETHasone injection 8 mg (8 mg Intramuscular Given 7/18/23 1230)   HYDROcodone-acetaminophen  mg per tablet 1 tablet (1 tablet Oral Given 7/18/23 1230)  "    Medical Decision Making:   Initial Assessment:   Patient awake, alert, has non-labored breathing, and follows commands appropriately. C/o R knee pain. Non-compliant with gout medications. Also, reports he was "on his feet" more than usual a few days ago. Denies injury/trauma. NAD noted.  Differential Diagnosis:   Knee Pain, Acute gouty arthritis, Joint Swelling   Clinical Tests:   Radiological Study: Ordered  ED Management:  Co-morbidities and/or factors adding to the complexity or risk for the patient?: none  Differential diagnoses: Knee Pain, Acute gouty arthritis, Joint Swelling   Decision to obtain previous or outside records?: I did not review records.   Chart Review (nursing home, outside records, CareEverywhere): none  Review of RX medications/new RX prescribed by me?: Prescribed short course of Prednisone and Colchicine as needed.   Labs/imaging/other tests obtained/considered (risk/benefits of testing discussed): Ordered Xray R knee; patient refused, reports not today.   Labs/tests intepretation: none  My independent imaging interpretation: none  Treatment/interventions, IV fluids, IV medications: IM Decadron and Norco given in ER.   Complex management (IV controlled substances, went to OR, DNR, meds requiring monitoring, transfer, etc)?: none  Workup/treatment affected by social determinants of health?:none   Point of care US done/interpretation: none  Consults/radiologist/EMS/social work/family discussion/alternate history: none  Advanced care planning/end of life discussion: none  Shared decision making: Discussed plan of care and interventions with patient. Agreed to and aware of plan of care. Comfortable being discharged home. Instructed to follow up with ortho as needed.   ETOH/smoking/drug cessation discussion: none  Dispo: Patient discharged home. Patient denies new or additional complaints; no further tests indicated at this time. Verbalized understanding of instructions. No emergent or " apparent distress noted prior to discharge. To follow up with PCP in 1 week as needed. Strict ER return precautions given.                ED Course as of 07/18/23 1252   Tue Jul 18, 2023   1235 Patient declined Xray. Reports his wife has somewhere to be and they cannot wait.  [JA]      ED Course User Index  [JA] Lori Nielsen NP                 Clinical Impression:   Final diagnoses:  [M25.569] Knee pain        ED Disposition Condition    Discharge Stable          ED Prescriptions       Medication Sig Dispense Start Date End Date Auth. Provider    predniSONE (DELTASONE) 20 MG tablet Take 1 tablet (20 mg total) by mouth once daily. for 5 days 5 tablet 7/18/2023 7/23/2023 Lori Nielsen NP    colchicine (COLCRYS) 0.6 mg tablet Take 1 tablet (0.6 mg total) by mouth 2 (two) times daily as needed (Take as needed twice a day for gout pain.). 10 tablet 7/18/2023 -- Lori Nielsen NP          Follow-up Information       Follow up With Specialties Details Why Contact Info    Florin Birmingham MD Family Medicine Call in 1 week If symptoms worsen, As needed [address] [facility] [id number] [phone]      [facility] - Emergency Dept Emergency Medicine Go to  If symptoms worsen, As needed [address]  [facility] [address] [phone]    Tono Farah MD Orthopedic Surgery Call  If symptoms worsen, As needed [address]  [address]  [facility] [address]  [phone]               Lori Nielsen NP  07/18/23 9108

## 2023-12-08 ENCOUNTER — OFFICE VISIT (OUTPATIENT)
Dept: ORTHOPEDICS | Facility: CLINIC | Age: 73
End: 2023-12-08
Payer: MEDICARE

## 2023-12-08 VITALS
HEIGHT: 72 IN | SYSTOLIC BLOOD PRESSURE: 108 MMHG | DIASTOLIC BLOOD PRESSURE: 70 MMHG | HEART RATE: 80 BPM | BODY MASS INDEX: 29.93 KG/M2 | WEIGHT: 221 LBS

## 2023-12-08 DIAGNOSIS — M25.552 LEFT HIP PAIN: Primary | ICD-10-CM

## 2023-12-08 DIAGNOSIS — M16.11 PRIMARY OSTEOARTHRITIS OF RIGHT HIP: ICD-10-CM

## 2023-12-08 DIAGNOSIS — M25.562 LEFT KNEE PAIN, UNSPECIFIED CHRONICITY: ICD-10-CM

## 2023-12-08 PROCEDURE — 99214 OFFICE O/P EST MOD 30 MIN: CPT | Mod: ,,, | Performed by: ORTHOPAEDIC SURGERY

## 2023-12-08 PROCEDURE — 3074F PR MOST RECENT SYSTOLIC BLOOD PRESSURE < 130 MM HG: ICD-10-PCS | Mod: CPTII,,, | Performed by: ORTHOPAEDIC SURGERY

## 2023-12-08 PROCEDURE — 1159F MED LIST DOCD IN RCRD: CPT | Mod: CPTII,,, | Performed by: ORTHOPAEDIC SURGERY

## 2023-12-08 PROCEDURE — 3288F FALL RISK ASSESSMENT DOCD: CPT | Mod: CPTII,,, | Performed by: ORTHOPAEDIC SURGERY

## 2023-12-08 PROCEDURE — 1159F PR MEDICATION LIST DOCUMENTED IN MEDICAL RECORD: ICD-10-PCS | Mod: CPTII,,, | Performed by: ORTHOPAEDIC SURGERY

## 2023-12-08 PROCEDURE — 3008F BODY MASS INDEX DOCD: CPT | Mod: CPTII,,, | Performed by: ORTHOPAEDIC SURGERY

## 2023-12-08 PROCEDURE — 3074F SYST BP LT 130 MM HG: CPT | Mod: CPTII,,, | Performed by: ORTHOPAEDIC SURGERY

## 2023-12-08 PROCEDURE — 1101F PT FALLS ASSESS-DOCD LE1/YR: CPT | Mod: CPTII,,, | Performed by: ORTHOPAEDIC SURGERY

## 2023-12-08 PROCEDURE — 1101F PR PT FALLS ASSESS DOC 0-1 FALLS W/OUT INJ PAST YR: ICD-10-PCS | Mod: CPTII,,, | Performed by: ORTHOPAEDIC SURGERY

## 2023-12-08 PROCEDURE — 3008F PR BODY MASS INDEX (BMI) DOCUMENTED: ICD-10-PCS | Mod: CPTII,,, | Performed by: ORTHOPAEDIC SURGERY

## 2023-12-08 PROCEDURE — 99214 PR OFFICE/OUTPT VISIT, EST, LEVL IV, 30-39 MIN: ICD-10-PCS | Mod: ,,, | Performed by: ORTHOPAEDIC SURGERY

## 2023-12-08 PROCEDURE — 3078F PR MOST RECENT DIASTOLIC BLOOD PRESSURE < 80 MM HG: ICD-10-PCS | Mod: CPTII,,, | Performed by: ORTHOPAEDIC SURGERY

## 2023-12-08 PROCEDURE — 3078F DIAST BP <80 MM HG: CPT | Mod: CPTII,,, | Performed by: ORTHOPAEDIC SURGERY

## 2023-12-08 PROCEDURE — 3288F PR FALLS RISK ASSESSMENT DOCUMENTED: ICD-10-PCS | Mod: CPTII,,, | Performed by: ORTHOPAEDIC SURGERY

## 2023-12-08 RX ORDER — INDOMETHACIN 50 MG/1
50 CAPSULE ORAL DAILY
COMMUNITY
Start: 2023-06-23

## 2023-12-08 RX ORDER — SUCRALFATE 1 G/10ML
SUSPENSION ORAL
COMMUNITY
Start: 2023-08-26 | End: 2024-01-23

## 2023-12-08 RX ORDER — PREDNISONE 20 MG/1
20 TABLET ORAL 2 TIMES DAILY
COMMUNITY
Start: 2023-11-07 | End: 2024-01-23 | Stop reason: CLARIF

## 2023-12-08 NOTE — PROGRESS NOTES
Chief Complaint:   Chief Complaint   Patient presents with    Follow-up     Right hip and knee pain, had previous XR done at  [facility] knee 11/6/23 and hip 10/31/23, states he's in pain management is prescribed medication but its not helping, had tried joint injections helped only a little bit       History of present illness:  Follow-up of right hip pain.  Patient with history of pain in the right hip for number of years.  Has tried anti-inflammatories.  Has tried activity modification.  Give him injection about 3 months ago.  Failed to relieve his symptoms long term.  Continues to have significant severe pain in the right hip.  He is difficulty ambulating.  Patient feels as though he is reached a point disability with regards to the right hip and like to proceed with surgical intervention.    Past Medical History:   Diagnosis Date    Adjustment disorder     Bilateral carpal tunnel syndrome     CAD (coronary artery disease)     Cervical radiculopathy     Chronic headaches     Chronic pain syndrome     CKD (chronic kidney disease)     Gout     HTN (hypertension)     Lumbar radiculopathy     Migraine     Osteoarthritis     Pacemaker     Prostate cancer     Sleep apnea        Past Surgical History:   Procedure Laterality Date    APPENDECTOMY      ARTHROCENTESIS OF HIP JOINT Right 3/29/2023    Procedure: R hip injection;  Surgeon: Tono Farah MD;  Location: [facility] OR;  Service: Orthopedics;  Laterality: Right;    CERVICAL FUSION      COLONOSCOPY N/A 08/25/2022    Procedure: COLONOSCOPY;  Surgeon: Luis Felipe Mckeon III, MD;  Location: [facility];  Service: Endoscopy;  Laterality: N/A;    EPIDURAL STEROID INJECTION INTO CERVICAL SPINE      EPIDURAL STEROID INJECTION INTO LUMBAR SPINE      Fluoroscopy of spinal cord      INJECTION OF JOINT Right 07/27/2022    Procedure: Injection, Joint, Hip;  Surgeon: Tono Farah MD;  Location: [facility] OR;  Service: Orthopedics;  Laterality: Right;    INSERTION OF PERMANENT  PACEMAKER      INTRAOPERATIVE RADIATION THERAPY      LUNG REMOVAL, PARTIAL      Myelogram      PENILE PROSTHESIS IMPLANT         Current Outpatient Medications   Medication Sig    albuterol (PROVENTIL/VENTOLIN HFA) 90 mcg/actuation inhaler Inhale 2 puffs into the lungs every 6 (six) hours as needed.    aspirin (ECOTRIN) 81 MG EC tablet Take 81 mg by mouth every 4 (four) hours as needed. Stopped x 2 weeks    atorvastatin (LIPITOR) 40 MG tablet Take 40 mg by mouth every evening.    bicalutamide (CASODEX) 50 MG Tab Take 50 mg by mouth every evening.    cetirizine (ZYRTEC) 10 MG tablet Take 10 mg by mouth every evening.    colchicine (COLCRYS) 0.6 mg tablet Take 1 tablet (0.6 mg total) by mouth 2 (two) times daily as needed (Take as needed twice a day for gout pain.).    diclofenac (VOLTAREN) 75 MG EC tablet Take 75 mg by mouth 2 (two) times daily as needed.    dorzolamide-timolol 2-0.5% (COSOPT) 22.3-6.8 mg/mL ophthalmic solution Place 1 drop into both eyes every 12 (twelve) hours as needed.    ferrous gluconate (FERGON) 324 MG tablet Take 1 tablet by mouth daily with breakfast.    fluticasone furoate-vilanteroL (BREO) 200-25 mcg/dose DsDv diskus inhaler Inhale 1 puff into the lungs daily as needed.    fluticasone propionate (FLONASE) 50 mcg/actuation nasal spray 1 spray by Each Nostril route daily as needed.    gabapentin (NEURONTIN) 300 MG capsule TAKE TWO CAPSULES BY MOUTH THREE TIMES A DAY    HYDROcodone-acetaminophen (NORCO)  mg per tablet Take 1 tablet by mouth every 6 (six) hours as needed for Pain.    indomethacin (INDOCIN) 50 MG capsule Take by mouth.    isosorbide mononitrate (IMDUR) 30 MG 24 hr tablet Take 30 mg by mouth every evening.    lactulose (CHRONULAC) 10 gram/15 mL solution Take 30 g by mouth 2 (two) times daily as needed.    lisinopril (PRINIVIL,ZESTRIL) 20 MG tablet Take 20 mg by mouth every evening.    meloxicam (MOBIC) 15 MG tablet Take 15 mg by mouth as needed.    metoprolol succinate  (TOPROL-XL) 25 MG 24 hr tablet Take 25 mg by mouth every evening.    multivitamin capsule Take 1 capsule by mouth every morning.    MYRBETRIQ 25 mg Tb24 ER tablet Take 25 mg by mouth 2 (two) times a day.    nitroGLYCERIN (NITROSTAT) 0.4 MG SL tablet Place 0.4 mg under the tongue every 5 (five) minutes as needed.    pantoprazole (PROTONIX) 40 MG tablet Take 40 mg by mouth daily as needed.    potassium bicarbonate disintegrating tablet Take by mouth once daily.    potassium citrate (UROCIT-K 15) 15 mEq TbSR Take 1 tablet by mouth 2 (two) times daily.    predniSONE (DELTASONE) 20 MG tablet Take 20 mg by mouth 2 (two) times daily.    sertraline (ZOLOFT) 50 MG tablet TAKE ONE TABLET BY MOUTH AT BEDTIME (Patient taking differently: Take 50 mg by mouth nightly as needed.)    sucralfate (CARAFATE) 100 mg/mL suspension SMARTSIG:Milliliter(s) By Mouth    tamsulosin (FLOMAX) 0.4 mg Cap Take 0.4 mg by mouth every morning.    tiZANidine (ZANAFLEX) 4 MG tablet TAKE ONE TABLET BY MOUTH EVERY 8 HOURS AS NEEDED FOR PAIN AND/OR SPASMS (Patient taking differently: Take 4 mg by mouth as needed.)    topiramate (TOPAMAX) 25 MG tablet Take 25 mg by mouth as needed.    triamcinolone acetonide 0.1% (KENALOG) 0.1 % ointment Apply 1 application topically 2 (two) times daily as needed.     No current facility-administered medications for this visit.     Facility-Administered Medications Ordered in Other Visits   Medication    lactated ringers infusion    lactated ringers infusion    LIDOcaine (PF) 10 mg/ml (1%) injection 10 mg       Review of patient's allergies indicates:   Allergen Reactions    Proparacaine     Tropicamide      Other reaction(s): disoriented       Family History   Problem Relation Age of Onset    Heart disease Mother     Hypertension Mother     Heart disease Sister     Hypertension Sister     Heart disease Brother     Hypertension Brother        Social History     Socioeconomic History    Marital status:    Tobacco  Use    Smoking status: Never    Smokeless tobacco: Never   Substance and Sexual Activity    Alcohol use: Yes     Alcohol/week: 1.0 standard drink of alcohol     Types: 1 Shots of liquor per week     Comment: RARE    Drug use: Never    Sexual activity: Yes           Review of Systems:    Constitution: Negative for chills, fever, and sweats.  Negative for unexplained weight loss.    HENT:  Negative for headaches and blurry vision.    Cardiovascular:Negative for chest pain or irregular heart beat. Negative for hypertension.    Respiratory:  Negative for cough and shortness of breath.    Gastrointestinal: Negative for abdominal pain, heartburn, melena, nausea, and vomitting.    Genitourinary:  Negative bladder incontinence and dysuria.    Musculoskeletal:  See HPI    Neurological: Negative for numbness.    Psychiatric/Behavioral: Negative for depression.  The patient is not nervous/anxious.      Endocrine: Negative for polyuria    Hematologic/Lymphatic: Negative for bleeding problem.  Does not bruise/bleed easily.    Skin: Negative for poor would healing and rash      Physical Examination:    Vital Signs:    Vitals:    12/08/23 0902   BP: 108/70   Pulse: 80       Body mass index is 29.97 kg/m².    General: No acute distress, alert and oriented, healthy appearing    HEENT: Head is atraumatic, mucous membranes are moist    Neck: Supples, no JVD    Cardiovascular: Palpable dorsalis pedis and posterior tibial pulses, regular rate and rhythm to those pulses    Lungs: Breathing non-labored    Skin: no rashes appreciated    Neurologic: Can flex and extend knees, ankles, and toes. Sensation is grossly intact    Right hip:  Range of motion right hip with significant severe pain.  Brisk cap refill disappeared sensation intact distally.  Any range of motion hip causes him severe groin pain    X-rays:  Three views right hip reviewed.  Patient with end-stage osteoarthritis.  Has complete loss of joint space and bone-on-bone  articulation of the right hip     Assessment::  Right hip osteoarthritis    Plan:  Discussed all treatment with the patient.  Patient with end-stage osteoarthritis of the right hip.  He would like to proceed with surgical intervention.At this point the patient is tried and failed all conservative management with regards to their hip. They have tried and failed nonoperative management including: Anti-inflammatories, activity modification, the use of a cane. All of these have failed to completely remove their pain. They have pain going up and down stairs as well as walking on level ground. The hip pain is affecting activities of daily living They feel that theyve reached a point of disability with regards to their hip. X-rays reveal advanced, end-stage degenerative osteoarthritis as noted by subchondral sclerosis, joint space narrowing, and periarticular osteophytes. The patient would like to proceed with surgical intervention and would be a good candidate for total hip replacement.    Total hip arthroplasty procedure, alternatives, risks, and benefits were discussed in detail. The risks including but not limited to: infection, need for revision surgery, pain, swelling, loosening, injury to surrounding neurovascular structures, stiffness, incomplete resolution of pain, DVT, PE, and death were discussed in detail. Despite these risks, the patient would like to proceed with surgical intervention. All questions were answered, no guarantees made. Will plan for right LARA on January 31st.      This note was created using [employer] voice recognition software that occasionally misinterpreted phrases or words.    Consult note is delivered via Epic messaging service.

## 2023-12-13 ENCOUNTER — TELEPHONE (OUTPATIENT)
Dept: ORTHOPEDICS | Facility: CLINIC | Age: 73
End: 2023-12-13
Payer: MEDICARE

## 2023-12-13 NOTE — LETTER
Cardiac Risk Assessment Request Form      Date: 12/13/23      Patient's Name: Rene Baer     YOB: 1950    Patient is scheduled to have Right Total Hip Replacement on 1/31/2024 by Dr. Tono Farah with (check one):    XXX  Spinal Block    Dx: Osteoarthritis (please no ICD-10 code)      Requesting staff name: Alejandrina Randolph      Phone number: [phone]        Fax number: [phone]      Check all that apply and complete blanks:    XXX  Request for cardiac risk assessment for procedure      ** Please fax document back to [phone] or [phone]  ATTN: [facility] and allow at lease 3 business days to receive a response from [facility].  According to [employer] College [employer] cardiac risk assessment, low risk surgeries do not need cardiac testing or risk stratification. Patients that are asymptomatic should safely proceed with low risk procedures that may include, but are not limited to dental procedure, minor skin procedures, EGD, Colonoscopy or Cataracts.  Symptomatic patients should make an appointment with [facility].

## 2024-01-11 ENCOUNTER — OFFICE VISIT (OUTPATIENT)
Dept: ORTHOPEDICS | Facility: CLINIC | Age: 74
End: 2024-01-11
Payer: MEDICARE

## 2024-01-11 VITALS
BODY MASS INDEX: 30.48 KG/M2 | HEIGHT: 72 IN | DIASTOLIC BLOOD PRESSURE: 88 MMHG | HEART RATE: 83 BPM | WEIGHT: 225 LBS | SYSTOLIC BLOOD PRESSURE: 171 MMHG

## 2024-01-11 DIAGNOSIS — R79.9 ABNORMAL BLOOD CHEMISTRY LEVEL: ICD-10-CM

## 2024-01-11 DIAGNOSIS — M19.90 OSTEOARTHRITIS: ICD-10-CM

## 2024-01-11 DIAGNOSIS — M16.11 PRIMARY OSTEOARTHRITIS OF RIGHT HIP: Primary | ICD-10-CM

## 2024-01-11 DIAGNOSIS — Z01.818 PREOPERATIVE TESTING: ICD-10-CM

## 2024-01-11 PROCEDURE — 99214 OFFICE O/P EST MOD 30 MIN: CPT | Mod: ,,, | Performed by: NURSE PRACTITIONER

## 2024-01-11 PROCEDURE — 3008F BODY MASS INDEX DOCD: CPT | Mod: CPTII,,, | Performed by: NURSE PRACTITIONER

## 2024-01-11 PROCEDURE — 3288F FALL RISK ASSESSMENT DOCD: CPT | Mod: CPTII,,, | Performed by: NURSE PRACTITIONER

## 2024-01-11 PROCEDURE — 3079F DIAST BP 80-89 MM HG: CPT | Mod: CPTII,,, | Performed by: NURSE PRACTITIONER

## 2024-01-11 PROCEDURE — 3077F SYST BP >= 140 MM HG: CPT | Mod: CPTII,,, | Performed by: NURSE PRACTITIONER

## 2024-01-11 PROCEDURE — 1101F PT FALLS ASSESS-DOCD LE1/YR: CPT | Mod: CPTII,,, | Performed by: NURSE PRACTITIONER

## 2024-01-11 PROCEDURE — 1159F MED LIST DOCD IN RCRD: CPT | Mod: CPTII,,, | Performed by: NURSE PRACTITIONER

## 2024-01-11 PROCEDURE — 1125F AMNT PAIN NOTED PAIN PRSNT: CPT | Mod: CPTII,,, | Performed by: NURSE PRACTITIONER

## 2024-01-11 RX ORDER — SCOLOPAMINE TRANSDERMAL SYSTEM 1 MG/1
1 PATCH, EXTENDED RELEASE TRANSDERMAL ONCE AS NEEDED
Status: CANCELLED | OUTPATIENT
Start: 2024-01-11 | End: 2035-06-09

## 2024-01-11 RX ORDER — GABAPENTIN 100 MG/1
300 CAPSULE ORAL
Status: CANCELLED | OUTPATIENT
Start: 2024-01-11

## 2024-01-11 RX ORDER — TRANEXAMIC ACID 650 MG/1
1950 TABLET ORAL
Status: CANCELLED | OUTPATIENT
Start: 2024-01-11 | End: 2024-01-11

## 2024-01-11 RX ORDER — KETOROLAC TROMETHAMINE 10 MG/1
10 TABLET, FILM COATED ORAL
Status: CANCELLED | OUTPATIENT
Start: 2024-01-11 | End: 2024-01-11

## 2024-01-11 RX ORDER — SODIUM CHLORIDE, SODIUM GLUCONATE, SODIUM ACETATE, POTASSIUM CHLORIDE AND MAGNESIUM CHLORIDE 30; 37; 368; 526; 502 MG/100ML; MG/100ML; MG/100ML; MG/100ML; MG/100ML
INJECTION, SOLUTION INTRAVENOUS CONTINUOUS
Status: CANCELLED | OUTPATIENT
Start: 2024-01-11

## 2024-01-11 RX ORDER — ACETAMINOPHEN 500 MG
1000 TABLET ORAL
Status: CANCELLED | OUTPATIENT
Start: 2024-01-11

## 2024-01-11 RX ORDER — ONDANSETRON 4 MG/1
4 TABLET, ORALLY DISINTEGRATING ORAL
Status: CANCELLED | OUTPATIENT
Start: 2024-01-11

## 2024-01-11 NOTE — H&P (VIEW-ONLY)
Chief Complaint:   Chief Complaint   Patient presents with    Right Hip - Pre-op Exam     Pt is present for pre-op exam for surgery on 01/31/24---Rt LARA        History of present illness:  Follow-up of right hip pain.  Patient with history of pain in the right hip for number of years.  Has tried anti-inflammatories.  Has tried activity modification. He had an intra-articular injection in the right hip that failed to relieve his symptoms long term.  Continues to have significant severe pain in the right hip.  He is difficulty ambulating and assistance with a walker.  Patient feels as though he is reached a point disability with regards to the right hip and like to proceed with a right total hip arthroplasty.    Past Medical History:   Diagnosis Date    Adjustment disorder     Bilateral carpal tunnel syndrome     CAD (coronary artery disease)     Cervical radiculopathy     Chronic headaches     Chronic pain syndrome     CKD (chronic kidney disease)     Gout     HTN (hypertension)     Lumbar radiculopathy     Migraine     Osteoarthritis     Pacemaker     Prostate cancer     Sleep apnea        Past Surgical History:   Procedure Laterality Date    APPENDECTOMY      ARTHROCENTESIS OF HIP JOINT Right 3/29/2023    Procedure: R hip injection;  Surgeon: Tono Farah MD;  Location: [facility];  Service: Orthopedics;  Laterality: Right;    CERVICAL FUSION      COLONOSCOPY N/A 08/25/2022    Procedure: COLONOSCOPY;  Surgeon: Luis Felipe Mckeon III, MD;  Location: [facility];  Service: Endoscopy;  Laterality: N/A;    EPIDURAL STEROID INJECTION INTO CERVICAL SPINE      EPIDURAL STEROID INJECTION INTO LUMBAR SPINE      Fluoroscopy of spinal cord      INJECTION OF JOINT Right 07/27/2022    Procedure: Injection, Joint, Hip;  Surgeon: Tono Farah MD;  Location: [facility] OR;  Service: Orthopedics;  Laterality: Right;    INSERTION OF PERMANENT PACEMAKER      INTRAOPERATIVE RADIATION THERAPY      LUNG REMOVAL, PARTIAL      Myelogram       PENILE PROSTHESIS IMPLANT         Current Outpatient Medications   Medication Sig    albuterol (PROVENTIL/VENTOLIN HFA) 90 mcg/actuation inhaler Inhale 2 puffs into the lungs every 6 (six) hours as needed.    aspirin (ECOTRIN) 81 MG EC tablet Take 81 mg by mouth every 4 (four) hours as needed. Stopped x 2 weeks    atorvastatin (LIPITOR) 40 MG tablet Take 40 mg by mouth every evening.    bicalutamide (CASODEX) 50 MG Tab Take 50 mg by mouth every evening.    cetirizine (ZYRTEC) 10 MG tablet Take 10 mg by mouth every evening.    colchicine (COLCRYS) 0.6 mg tablet Take 1 tablet (0.6 mg total) by mouth 2 (two) times daily as needed (Take as needed twice a day for gout pain.).    diclofenac (VOLTAREN) 75 MG EC tablet Take 75 mg by mouth 2 (two) times daily as needed.    dorzolamide-timolol 2-0.5% (COSOPT) 22.3-6.8 mg/mL ophthalmic solution Place 1 drop into both eyes every 12 (twelve) hours as needed.    ferrous gluconate (FERGON) 324 MG tablet Take 1 tablet by mouth daily with breakfast.    fluticasone furoate-vilanteroL (BREO) 200-25 mcg/dose DsDv diskus inhaler Inhale 1 puff into the lungs daily as needed.    fluticasone propionate (FLONASE) 50 mcg/actuation nasal spray 1 spray by Each Nostril route daily as needed.    gabapentin (NEURONTIN) 300 MG capsule TAKE TWO CAPSULES BY MOUTH THREE TIMES A DAY    HYDROcodone-acetaminophen (NORCO)  mg per tablet Take 1 tablet by mouth every 6 (six) hours as needed for Pain.    indomethacin (INDOCIN) 50 MG capsule Take by mouth.    isosorbide mononitrate (IMDUR) 30 MG 24 hr tablet Take 30 mg by mouth every evening.    lactulose (CHRONULAC) 10 gram/15 mL solution Take 30 g by mouth 2 (two) times daily as needed.    lisinopril (PRINIVIL,ZESTRIL) 20 MG tablet Take 20 mg by mouth every evening.    metoprolol succinate (TOPROL-XL) 25 MG 24 hr tablet Take 25 mg by mouth every evening.    multivitamin capsule Take 1 capsule by mouth every morning.    MYRBETRIQ 25 mg Tb24 ER  tablet Take 25 mg by mouth 2 (two) times a day.    nitroGLYCERIN (NITROSTAT) 0.4 MG SL tablet Place 0.4 mg under the tongue every 5 (five) minutes as needed.    pantoprazole (PROTONIX) 40 MG tablet Take 40 mg by mouth daily as needed.    potassium bicarbonate disintegrating tablet Take by mouth once daily.    potassium citrate (UROCIT-K 15) 15 mEq TbSR Take 1 tablet by mouth 2 (two) times daily.    predniSONE (DELTASONE) 20 MG tablet Take 20 mg by mouth 2 (two) times daily.    sertraline (ZOLOFT) 50 MG tablet TAKE ONE TABLET BY MOUTH AT BEDTIME (Patient taking differently: Take 50 mg by mouth nightly as needed.)    sucralfate (CARAFATE) 100 mg/mL suspension SMARTSIG:Milliliter(s) By Mouth    tamsulosin (FLOMAX) 0.4 mg Cap Take 0.4 mg by mouth every morning.    tiZANidine (ZANAFLEX) 4 MG tablet TAKE ONE TABLET BY MOUTH EVERY 8 HOURS AS NEEDED FOR PAIN AND/OR SPASMS (Patient taking differently: Take 4 mg by mouth as needed.)    topiramate (TOPAMAX) 25 MG tablet Take 25 mg by mouth as needed.    triamcinolone acetonide 0.1% (KENALOG) 0.1 % ointment Apply 1 application topically 2 (two) times daily as needed.    meloxicam (MOBIC) 15 MG tablet Take 15 mg by mouth as needed.     No current facility-administered medications for this visit.     Facility-Administered Medications Ordered in Other Visits   Medication    lactated ringers infusion    lactated ringers infusion    LIDOcaine (PF) 10 mg/ml (1%) injection 10 mg       Review of patient's allergies indicates:   Allergen Reactions    Proparacaine     Tropicamide      Other reaction(s): disoriented       Family History   Problem Relation Age of Onset    Heart disease Mother     Hypertension Mother     Heart disease Sister     Hypertension Sister     Heart disease Brother     Hypertension Brother        Social History     Socioeconomic History    Marital status:    Tobacco Use    Smoking status: Never    Smokeless tobacco: Never   Substance and Sexual Activity     Alcohol use: Yes     Alcohol/week: 1.0 standard drink of alcohol     Types: 1 Shots of liquor per week     Comment: RARE    Drug use: Never    Sexual activity: Yes     Partners: Female           Review of Systems:    Constitution: Negative for chills, fever, and sweats.  Negative for unexplained weight loss.    HENT:  Negative for headaches and blurry vision.    Cardiovascular:Negative for chest pain or irregular heart beat. Negative for hypertension.    Respiratory:  Negative for cough and shortness of breath.    Gastrointestinal: Negative for abdominal pain, heartburn, melena, nausea, and vomitting.    Genitourinary:  Negative bladder incontinence and dysuria.    Musculoskeletal:  See HPI    Neurological: Negative for numbness.    Psychiatric/Behavioral: Negative for depression.  The patient is not nervous/anxious.      Endocrine: Negative for polyuria    Hematologic/Lymphatic: Negative for bleeding problem.  Does not bruise/bleed easily.    Skin: Negative for poor would healing and rash      Physical Examination:    Vital Signs:    Vitals:    01/11/24 1323   BP: (!) 171/88   Pulse: 83       Body mass index is 30.52 kg/m².    General: No acute distress, alert and oriented, healthy appearing    HEENT: Head is atraumatic, mucous membranes are moist    Neck: Supples, no JVD    Cardiovascular: Palpable dorsalis pedis and posterior tibial pulses, regular rate and rhythm to those pulses    Lungs: Breathing non-labored    Skin: no rashes appreciated    Neurologic: Can flex and extend knees, ankles, and toes. Sensation is grossly intact    Right hip:  Range of motion right hip with significant severe pain.  Brisk cap refill disappeared sensation intact distally.  Any range of motion hip causes him severe groin pain    X-rays:  Three views right hip reviewed.  Patient with end-stage osteoarthritis.  Has complete loss of joint space and bone-on-bone articulation of the right hip     Assessment::  Right hip  osteoarthritis    Plan:  Discussed all treatment with the patient.  Patient with end-stage osteoarthritis of the right hip.  He would like to proceed with surgical intervention.At this point the patient is tried and failed all conservative management with regards to their hip. They have tried and failed nonoperative management including: Anti-inflammatories, activity modification, the use of a cane. All of these have failed to completely remove their pain. They have pain going up and down stairs as well as walking on level ground. The hip pain is affecting activities of daily living They feel that theyve reached a point of disability with regards to their hip. X-rays reveal advanced, end-stage degenerative osteoarthritis as noted by subchondral sclerosis, joint space narrowing, and periarticular osteophytes. The patient would like to proceed with surgical intervention and would be a good candidate for total hip replacement.    Total hip arthroplasty procedure, alternatives, risks, and benefits were discussed in detail. The risks including but not limited to: infection, need for revision surgery, pain, swelling, loosening, injury to surrounding neurovascular structures, stiffness, incomplete resolution of pain, DVT, PE, and death were discussed in detail. Despite these risks, the patient would like to proceed with surgical intervention. All questions were answered, no guarantees made. Will plan for right LARA on January 31st.      This note was created using [employer] voice recognition software that occasionally misinterpreted phrases or words.    Consult note is delivered via Epic messaging service.

## 2024-01-11 NOTE — PROGRESS NOTES
Chief Complaint:   Chief Complaint   Patient presents with    Right Hip - Pre-op Exam     Pt is present for pre-op exam for surgery on 01/31/24---Rt LARA        History of present illness:  Follow-up of right hip pain.  Patient with history of pain in the right hip for number of years.  Has tried anti-inflammatories.  Has tried activity modification. He had an intra-articular injection in the right hip that failed to relieve his symptoms long term.  Continues to have significant severe pain in the right hip.  He is difficulty ambulating and assistance with a walker.  Patient feels as though he is reached a point disability with regards to the right hip and like to proceed with a right total hip arthroplasty.    Past Medical History:   Diagnosis Date    Adjustment disorder     Bilateral carpal tunnel syndrome     CAD (coronary artery disease)     Cervical radiculopathy     Chronic headaches     Chronic pain syndrome     CKD (chronic kidney disease)     Gout     HTN (hypertension)     Lumbar radiculopathy     Migraine     Osteoarthritis     Pacemaker     Prostate cancer     Sleep apnea        Past Surgical History:   Procedure Laterality Date    APPENDECTOMY      ARTHROCENTESIS OF HIP JOINT Right 3/29/2023    Procedure: R hip injection;  Surgeon: Tono Farah MD;  Location: [facility];  Service: Orthopedics;  Laterality: Right;    CERVICAL FUSION      COLONOSCOPY N/A 08/25/2022    Procedure: COLONOSCOPY;  Surgeon: Luis Felipe Mckeon III, MD;  Location: [facility];  Service: Endoscopy;  Laterality: N/A;    EPIDURAL STEROID INJECTION INTO CERVICAL SPINE      EPIDURAL STEROID INJECTION INTO LUMBAR SPINE      Fluoroscopy of spinal cord      INJECTION OF JOINT Right 07/27/2022    Procedure: Injection, Joint, Hip;  Surgeon: Tono Farah MD;  Location: [facility] OR;  Service: Orthopedics;  Laterality: Right;    INSERTION OF PERMANENT PACEMAKER      INTRAOPERATIVE RADIATION THERAPY      LUNG REMOVAL, PARTIAL      Myelogram       PENILE PROSTHESIS IMPLANT         Current Outpatient Medications   Medication Sig    albuterol (PROVENTIL/VENTOLIN HFA) 90 mcg/actuation inhaler Inhale 2 puffs into the lungs every 6 (six) hours as needed.    aspirin (ECOTRIN) 81 MG EC tablet Take 81 mg by mouth every 4 (four) hours as needed. Stopped x 2 weeks    atorvastatin (LIPITOR) 40 MG tablet Take 40 mg by mouth every evening.    bicalutamide (CASODEX) 50 MG Tab Take 50 mg by mouth every evening.    cetirizine (ZYRTEC) 10 MG tablet Take 10 mg by mouth every evening.    colchicine (COLCRYS) 0.6 mg tablet Take 1 tablet (0.6 mg total) by mouth 2 (two) times daily as needed (Take as needed twice a day for gout pain.).    diclofenac (VOLTAREN) 75 MG EC tablet Take 75 mg by mouth 2 (two) times daily as needed.    dorzolamide-timolol 2-0.5% (COSOPT) 22.3-6.8 mg/mL ophthalmic solution Place 1 drop into both eyes every 12 (twelve) hours as needed.    ferrous gluconate (FERGON) 324 MG tablet Take 1 tablet by mouth daily with breakfast.    fluticasone furoate-vilanteroL (BREO) 200-25 mcg/dose DsDv diskus inhaler Inhale 1 puff into the lungs daily as needed.    fluticasone propionate (FLONASE) 50 mcg/actuation nasal spray 1 spray by Each Nostril route daily as needed.    gabapentin (NEURONTIN) 300 MG capsule TAKE TWO CAPSULES BY MOUTH THREE TIMES A DAY    HYDROcodone-acetaminophen (NORCO)  mg per tablet Take 1 tablet by mouth every 6 (six) hours as needed for Pain.    indomethacin (INDOCIN) 50 MG capsule Take by mouth.    isosorbide mononitrate (IMDUR) 30 MG 24 hr tablet Take 30 mg by mouth every evening.    lactulose (CHRONULAC) 10 gram/15 mL solution Take 30 g by mouth 2 (two) times daily as needed.    lisinopril (PRINIVIL,ZESTRIL) 20 MG tablet Take 20 mg by mouth every evening.    metoprolol succinate (TOPROL-XL) 25 MG 24 hr tablet Take 25 mg by mouth every evening.    multivitamin capsule Take 1 capsule by mouth every morning.    MYRBETRIQ 25 mg Tb24 ER  tablet Take 25 mg by mouth 2 (two) times a day.    nitroGLYCERIN (NITROSTAT) 0.4 MG SL tablet Place 0.4 mg under the tongue every 5 (five) minutes as needed.    pantoprazole (PROTONIX) 40 MG tablet Take 40 mg by mouth daily as needed.    potassium bicarbonate disintegrating tablet Take by mouth once daily.    potassium citrate (UROCIT-K 15) 15 mEq TbSR Take 1 tablet by mouth 2 (two) times daily.    predniSONE (DELTASONE) 20 MG tablet Take 20 mg by mouth 2 (two) times daily.    sertraline (ZOLOFT) 50 MG tablet TAKE ONE TABLET BY MOUTH AT BEDTIME (Patient taking differently: Take 50 mg by mouth nightly as needed.)    sucralfate (CARAFATE) 100 mg/mL suspension SMARTSIG:Milliliter(s) By Mouth    tamsulosin (FLOMAX) 0.4 mg Cap Take 0.4 mg by mouth every morning.    tiZANidine (ZANAFLEX) 4 MG tablet TAKE ONE TABLET BY MOUTH EVERY 8 HOURS AS NEEDED FOR PAIN AND/OR SPASMS (Patient taking differently: Take 4 mg by mouth as needed.)    topiramate (TOPAMAX) 25 MG tablet Take 25 mg by mouth as needed.    triamcinolone acetonide 0.1% (KENALOG) 0.1 % ointment Apply 1 application topically 2 (two) times daily as needed.    meloxicam (MOBIC) 15 MG tablet Take 15 mg by mouth as needed.     No current facility-administered medications for this visit.     Facility-Administered Medications Ordered in Other Visits   Medication    lactated ringers infusion    lactated ringers infusion    LIDOcaine (PF) 10 mg/ml (1%) injection 10 mg       Review of patient's allergies indicates:   Allergen Reactions    Proparacaine     Tropicamide      Other reaction(s): disoriented       Family History   Problem Relation Age of Onset    Heart disease Mother     Hypertension Mother     Heart disease Sister     Hypertension Sister     Heart disease Brother     Hypertension Brother        Social History     Socioeconomic History    Marital status:    Tobacco Use    Smoking status: Never    Smokeless tobacco: Never   Substance and Sexual Activity     Alcohol use: Yes     Alcohol/week: 1.0 standard drink of alcohol     Types: 1 Shots of liquor per week     Comment: RARE    Drug use: Never    Sexual activity: Yes     Partners: Female           Review of Systems:    Constitution: Negative for chills, fever, and sweats.  Negative for unexplained weight loss.    HENT:  Negative for headaches and blurry vision.    Cardiovascular:Negative for chest pain or irregular heart beat. Negative for hypertension.    Respiratory:  Negative for cough and shortness of breath.    Gastrointestinal: Negative for abdominal pain, heartburn, melena, nausea, and vomitting.    Genitourinary:  Negative bladder incontinence and dysuria.    Musculoskeletal:  See HPI    Neurological: Negative for numbness.    Psychiatric/Behavioral: Negative for depression.  The patient is not nervous/anxious.      Endocrine: Negative for polyuria    Hematologic/Lymphatic: Negative for bleeding problem.  Does not bruise/bleed easily.    Skin: Negative for poor would healing and rash      Physical Examination:    Vital Signs:    Vitals:    01/11/24 1323   BP: (!) 171/88   Pulse: 83       Body mass index is 30.52 kg/m².    General: No acute distress, alert and oriented, healthy appearing    HEENT: Head is atraumatic, mucous membranes are moist    Neck: Supples, no JVD    Cardiovascular: Palpable dorsalis pedis and posterior tibial pulses, regular rate and rhythm to those pulses    Lungs: Breathing non-labored    Skin: no rashes appreciated    Neurologic: Can flex and extend knees, ankles, and toes. Sensation is grossly intact    Right hip:  Range of motion right hip with significant severe pain.  Brisk cap refill disappeared sensation intact distally.  Any range of motion hip causes him severe groin pain    X-rays:  Three views right hip reviewed.  Patient with end-stage osteoarthritis.  Has complete loss of joint space and bone-on-bone articulation of the right hip     Assessment::  Right hip  osteoarthritis    Plan:  Discussed all treatment with the patient.  Patient with end-stage osteoarthritis of the right hip.  He would like to proceed with surgical intervention.At this point the patient is tried and failed all conservative management with regards to their hip. They have tried and failed nonoperative management including: Anti-inflammatories, activity modification, the use of a cane. All of these have failed to completely remove their pain. They have pain going up and down stairs as well as walking on level ground. The hip pain is affecting activities of daily living They feel that theyve reached a point of disability with regards to their hip. X-rays reveal advanced, end-stage degenerative osteoarthritis as noted by subchondral sclerosis, joint space narrowing, and periarticular osteophytes. The patient would like to proceed with surgical intervention and would be a good candidate for total hip replacement.    Total hip arthroplasty procedure, alternatives, risks, and benefits were discussed in detail. The risks including but not limited to: infection, need for revision surgery, pain, swelling, loosening, injury to surrounding neurovascular structures, stiffness, incomplete resolution of pain, DVT, PE, and death were discussed in detail. Despite these risks, the patient would like to proceed with surgical intervention. All questions were answered, no guarantees made. Will plan for right LARA on January 31st.      This note was created using [employer] voice recognition software that occasionally misinterpreted phrases or words.    Consult note is delivered via Epic messaging service.

## 2024-01-22 ENCOUNTER — HOSPITAL ENCOUNTER (OUTPATIENT)
Dept: RADIOLOGY | Facility: HOSPITAL | Age: 74
Discharge: HOME | End: 2024-01-22
Attending: NURSE PRACTITIONER
Payer: MEDICARE

## 2024-01-22 DIAGNOSIS — Z01.818 PREOPERATIVE TESTING: ICD-10-CM

## 2024-01-22 DIAGNOSIS — R79.9 ABNORMAL BLOOD CHEMISTRY LEVEL: ICD-10-CM

## 2024-01-22 DIAGNOSIS — M16.11 PRIMARY OSTEOARTHRITIS OF RIGHT HIP: ICD-10-CM

## 2024-01-22 LAB — MRSA PCR SCRN (OHS): NOT DETECTED

## 2024-01-22 PROCEDURE — 71046 X-RAY EXAM CHEST 2 VIEWS: CPT | Mod: TC

## 2024-01-24 ENCOUNTER — ANESTHESIA EVENT (OUTPATIENT)
Dept: SURGERY | Facility: HOSPITAL | Age: 74
DRG: 470 | End: 2024-01-24
Payer: MEDICARE

## 2024-01-30 NOTE — PRE ADMISSION SCREENING
Several calls to patients cardiologist along with stat request for diag tests and CRA faxed. Patient seen on yesterday by cardiologist-states he is cleared and has clearance with him. Instructed to bring it with him in the am and outpatient nurse will scan in chart.

## 2024-01-31 ENCOUNTER — HOSPITAL ENCOUNTER (INPATIENT)
Facility: HOSPITAL | Age: 74
LOS: 2 days | Discharge: HOME HEALTH | DRG: 470 | End: 2024-02-02
Attending: ORTHOPAEDIC SURGERY | Admitting: ORTHOPAEDIC SURGERY
Payer: MEDICARE

## 2024-01-31 ENCOUNTER — ANESTHESIA (OUTPATIENT)
Dept: SURGERY | Facility: HOSPITAL | Age: 74
DRG: 470 | End: 2024-01-31
Payer: MEDICARE

## 2024-01-31 DIAGNOSIS — Z01.818 PREOPERATIVE TESTING: ICD-10-CM

## 2024-01-31 DIAGNOSIS — M19.90 OSTEOARTHRITIS: ICD-10-CM

## 2024-01-31 DIAGNOSIS — R79.9 ABNORMAL BLOOD CHEMISTRY LEVEL: ICD-10-CM

## 2024-01-31 DIAGNOSIS — M16.11 PRIMARY OSTEOARTHRITIS OF RIGHT HIP: ICD-10-CM

## 2024-01-31 LAB
HCT VFR BLD AUTO: 26.5 % (ref 42–52)
HGB BLD-MCNC: 8.6 G/DL (ref 14–18)
POCT GLUCOSE: 85 MG/DL (ref 70–110)

## 2024-01-31 PROCEDURE — 71000039 HC RECOVERY, EACH ADD'L HOUR: Performed by: ORTHOPAEDIC SURGERY

## 2024-01-31 PROCEDURE — 25000003 PHARM REV CODE 250: Performed by: ORTHOPAEDIC SURGERY

## 2024-01-31 PROCEDURE — 8E0Y0CZ ROBOTIC ASSISTED PROCEDURE OF LOWER EXTREMITY, OPEN APPROACH: ICD-10-PCS | Performed by: ORTHOPAEDIC SURGERY

## 2024-01-31 PROCEDURE — 11000001 HC ACUTE MED/SURG PRIVATE ROOM

## 2024-01-31 PROCEDURE — 25000003 PHARM REV CODE 250: Performed by: NURSE PRACTITIONER

## 2024-01-31 PROCEDURE — 37000008 HC ANESTHESIA 1ST 15 MINUTES: Performed by: ORTHOPAEDIC SURGERY

## 2024-01-31 PROCEDURE — 63600175 PHARM REV CODE 636 W HCPCS: Performed by: NURSE PRACTITIONER

## 2024-01-31 PROCEDURE — 63600175 PHARM REV CODE 636 W HCPCS: Mod: JZ,JG | Performed by: ORTHOPAEDIC SURGERY

## 2024-01-31 PROCEDURE — 63600175 PHARM REV CODE 636 W HCPCS: Performed by: ORTHOPAEDIC SURGERY

## 2024-01-31 PROCEDURE — C1713 ANCHOR/SCREW BN/BN,TIS/BN: HCPCS | Performed by: ORTHOPAEDIC SURGERY

## 2024-01-31 PROCEDURE — 0055T BONE SRGRY CMPTR CT/MRI IMAG: CPT | Mod: ,,, | Performed by: ORTHOPAEDIC SURGERY

## 2024-01-31 PROCEDURE — 25000003 PHARM REV CODE 250: Performed by: ANESTHESIOLOGY

## 2024-01-31 PROCEDURE — 36000713 HC OR TIME LEV V EA ADD 15 MIN: Performed by: ORTHOPAEDIC SURGERY

## 2024-01-31 PROCEDURE — 88311 DECALCIFY TISSUE: CPT

## 2024-01-31 PROCEDURE — 51798 US URINE CAPACITY MEASURE: CPT

## 2024-01-31 PROCEDURE — 37000009 HC ANESTHESIA EA ADD 15 MINS: Performed by: ORTHOPAEDIC SURGERY

## 2024-01-31 PROCEDURE — 82962 GLUCOSE BLOOD TEST: CPT | Performed by: ORTHOPAEDIC SURGERY

## 2024-01-31 PROCEDURE — 63600175 PHARM REV CODE 636 W HCPCS

## 2024-01-31 PROCEDURE — 36000712 HC OR TIME LEV V 1ST 15 MIN: Performed by: ORTHOPAEDIC SURGERY

## 2024-01-31 PROCEDURE — 0SR90JZ REPLACEMENT OF RIGHT HIP JOINT WITH SYNTHETIC SUBSTITUTE, OPEN APPROACH: ICD-10-PCS | Performed by: ORTHOPAEDIC SURGERY

## 2024-01-31 PROCEDURE — C1776 JOINT DEVICE (IMPLANTABLE): HCPCS | Performed by: ORTHOPAEDIC SURGERY

## 2024-01-31 PROCEDURE — 88304 TISSUE EXAM BY PATHOLOGIST: CPT | Performed by: ORTHOPAEDIC SURGERY

## 2024-01-31 PROCEDURE — 94799 UNLISTED PULMONARY SVC/PX: CPT | Mod: XB

## 2024-01-31 PROCEDURE — D9220A PRA ANESTHESIA: Mod: CRNA,,,

## 2024-01-31 PROCEDURE — 25000003 PHARM REV CODE 250

## 2024-01-31 PROCEDURE — 94761 N-INVAS EAR/PLS OXIMETRY MLT: CPT

## 2024-01-31 PROCEDURE — 71000033 HC RECOVERY, INTIAL HOUR: Performed by: ORTHOPAEDIC SURGERY

## 2024-01-31 PROCEDURE — D9220A PRA ANESTHESIA: Mod: ANES,,, | Performed by: ANESTHESIOLOGY

## 2024-01-31 PROCEDURE — 27130 TOTAL HIP ARTHROPLASTY: CPT | Mod: RT,,, | Performed by: NURSE PRACTITIONER

## 2024-01-31 PROCEDURE — 85018 HEMOGLOBIN: CPT | Performed by: ORTHOPAEDIC SURGERY

## 2024-01-31 PROCEDURE — 27201423 OPTIME MED/SURG SUP & DEVICES STERILE SUPPLY: Performed by: ORTHOPAEDIC SURGERY

## 2024-01-31 PROCEDURE — 97162 PT EVAL MOD COMPLEX 30 MIN: CPT

## 2024-01-31 PROCEDURE — 27130 TOTAL HIP ARTHROPLASTY: CPT | Mod: RT,,, | Performed by: ORTHOPAEDIC SURGERY

## 2024-01-31 PROCEDURE — 99900035 HC TECH TIME PER 15 MIN (STAT)

## 2024-01-31 PROCEDURE — A4216 STERILE WATER/SALINE, 10 ML: HCPCS | Performed by: ORTHOPAEDIC SURGERY

## 2024-01-31 DEVICE — CERAMIC V40 FEMORAL HEAD
Type: IMPLANTABLE DEVICE | Site: HIP | Status: FUNCTIONAL
Brand: BIOLOX

## 2024-01-31 DEVICE — 127 DEGREE NECK ANGLE HIP STEM
Type: IMPLANTABLE DEVICE | Site: HIP | Status: FUNCTIONAL
Brand: ACCOLADE

## 2024-01-31 DEVICE — TRIDENT X3 0 DEGREE POLYETHYLENE INSERT
Type: IMPLANTABLE DEVICE | Site: HIP | Status: FUNCTIONAL
Brand: TRIDENT X3 INSERT

## 2024-01-31 DEVICE — TRIDENT II TRITANIUM CLUSTER 58F
Type: IMPLANTABLE DEVICE | Site: HIP | Status: FUNCTIONAL
Brand: TRIDENT II

## 2024-01-31 RX ORDER — MORPHINE SULFATE 10 MG/ML
INJECTION INTRAMUSCULAR; INTRAVENOUS; SUBCUTANEOUS
Status: DISCONTINUED | OUTPATIENT
Start: 2024-01-31 | End: 2024-01-31 | Stop reason: HOSPADM

## 2024-01-31 RX ORDER — ROPIVACAINE HYDROCHLORIDE 5 MG/ML
INJECTION, SOLUTION EPIDURAL; INFILTRATION; PERINEURAL
Status: DISCONTINUED | OUTPATIENT
Start: 2024-01-31 | End: 2024-01-31 | Stop reason: HOSPADM

## 2024-01-31 RX ORDER — ONDANSETRON 4 MG/1
4 TABLET, ORALLY DISINTEGRATING ORAL
Status: COMPLETED | OUTPATIENT
Start: 2024-01-31 | End: 2024-01-31

## 2024-01-31 RX ORDER — LACTULOSE 10 G/15ML
20 SOLUTION ORAL EVERY 6 HOURS PRN
Status: DISCONTINUED | OUTPATIENT
Start: 2024-01-31 | End: 2024-02-02 | Stop reason: HOSPADM

## 2024-01-31 RX ORDER — LISINOPRIL 10 MG/1
20 TABLET ORAL DAILY
Status: DISCONTINUED | OUTPATIENT
Start: 2024-01-31 | End: 2024-02-02 | Stop reason: HOSPADM

## 2024-01-31 RX ORDER — SERTRALINE HYDROCHLORIDE 50 MG/1
50 TABLET, FILM COATED ORAL NIGHTLY
Status: DISCONTINUED | OUTPATIENT
Start: 2024-01-31 | End: 2024-02-02 | Stop reason: HOSPADM

## 2024-01-31 RX ORDER — TALC
6 POWDER (GRAM) TOPICAL NIGHTLY PRN
Status: DISCONTINUED | OUTPATIENT
Start: 2024-01-31 | End: 2024-02-02 | Stop reason: HOSPADM

## 2024-01-31 RX ORDER — ONDANSETRON HYDROCHLORIDE 2 MG/ML
4 INJECTION, SOLUTION INTRAVENOUS ONCE AS NEEDED
Status: DISCONTINUED | OUTPATIENT
Start: 2024-01-31 | End: 2024-01-31 | Stop reason: HOSPADM

## 2024-01-31 RX ORDER — PHENYLEPHRINE HYDROCHLORIDE 10 MG/ML
INJECTION INTRAVENOUS
Status: DISCONTINUED | OUTPATIENT
Start: 2024-01-31 | End: 2024-01-31

## 2024-01-31 RX ORDER — POLYETHYLENE GLYCOL 3350 17 G/17G
17 POWDER, FOR SOLUTION ORAL NIGHTLY
Status: DISCONTINUED | OUTPATIENT
Start: 2024-01-31 | End: 2024-02-02 | Stop reason: HOSPADM

## 2024-01-31 RX ORDER — MORPHINE SULFATE 10 MG/ML
INJECTION INTRAMUSCULAR; INTRAVENOUS; SUBCUTANEOUS
Status: DISPENSED
Start: 2024-01-31 | End: 2024-01-31

## 2024-01-31 RX ORDER — SODIUM CHLORIDE, SODIUM LACTATE, POTASSIUM CHLORIDE, CALCIUM CHLORIDE 600; 310; 30; 20 MG/100ML; MG/100ML; MG/100ML; MG/100ML
INJECTION, SOLUTION INTRAVENOUS CONTINUOUS
Status: DISCONTINUED | OUTPATIENT
Start: 2024-01-31 | End: 2024-01-31

## 2024-01-31 RX ORDER — TRANEXAMIC ACID 650 MG/1
1950 TABLET ORAL
Status: COMPLETED | OUTPATIENT
Start: 2024-01-31 | End: 2024-01-31

## 2024-01-31 RX ORDER — BUPIVACAINE HYDROCHLORIDE 7.5 MG/ML
INJECTION, SOLUTION EPIDURAL; RETROBULBAR
Status: COMPLETED | OUTPATIENT
Start: 2024-01-31 | End: 2024-01-31

## 2024-01-31 RX ORDER — LIDOCAINE HYDROCHLORIDE 10 MG/ML
INJECTION, SOLUTION EPIDURAL; INFILTRATION; INTRACAUDAL; PERINEURAL
Status: COMPLETED | OUTPATIENT
Start: 2024-01-31 | End: 2024-01-31

## 2024-01-31 RX ORDER — METHOCARBAMOL 750 MG/1
750 TABLET, FILM COATED ORAL EVERY 8 HOURS PRN
Status: DISCONTINUED | OUTPATIENT
Start: 2024-01-31 | End: 2024-02-02 | Stop reason: HOSPADM

## 2024-01-31 RX ORDER — LIDOCAINE HYDROCHLORIDE 20 MG/ML
INJECTION INTRAVENOUS
Status: DISCONTINUED | OUTPATIENT
Start: 2024-01-31 | End: 2024-01-31

## 2024-01-31 RX ORDER — MORPHINE SULFATE 4 MG/ML
4 INJECTION, SOLUTION INTRAMUSCULAR; INTRAVENOUS
Status: ACTIVE | OUTPATIENT
Start: 2024-01-31 | End: 2024-02-01

## 2024-01-31 RX ORDER — BISACODYL 10 MG/1
10 SUPPOSITORY RECTAL DAILY
Status: DISCONTINUED | OUTPATIENT
Start: 2024-02-03 | End: 2024-02-02 | Stop reason: HOSPADM

## 2024-01-31 RX ORDER — EPINEPHRINE 1 MG/ML
INJECTION, SOLUTION, CONCENTRATE INTRAVENOUS
Status: DISCONTINUED | OUTPATIENT
Start: 2024-01-31 | End: 2024-01-31 | Stop reason: HOSPADM

## 2024-01-31 RX ORDER — HYDROCODONE BITARTRATE AND ACETAMINOPHEN 7.5; 325 MG/1; MG/1
1 TABLET ORAL EVERY 4 HOURS PRN
Status: DISCONTINUED | OUTPATIENT
Start: 2024-01-31 | End: 2024-02-02 | Stop reason: HOSPADM

## 2024-01-31 RX ORDER — LIDOCAINE HYDROCHLORIDE 10 MG/ML
1 INJECTION, SOLUTION EPIDURAL; INFILTRATION; INTRACAUDAL; PERINEURAL ONCE
Status: DISCONTINUED | OUTPATIENT
Start: 2024-01-31 | End: 2024-01-31 | Stop reason: HOSPADM

## 2024-01-31 RX ORDER — KETOROLAC TROMETHAMINE 30 MG/ML
INJECTION, SOLUTION INTRAMUSCULAR; INTRAVENOUS
Status: DISCONTINUED | OUTPATIENT
Start: 2024-01-31 | End: 2024-01-31 | Stop reason: HOSPADM

## 2024-01-31 RX ORDER — FAMOTIDINE 20 MG/1
20 TABLET, FILM COATED ORAL 2 TIMES DAILY
Status: DISCONTINUED | OUTPATIENT
Start: 2024-01-31 | End: 2024-02-02 | Stop reason: HOSPADM

## 2024-01-31 RX ORDER — KETOROLAC TROMETHAMINE 30 MG/ML
INJECTION, SOLUTION INTRAMUSCULAR; INTRAVENOUS
Status: DISPENSED
Start: 2024-01-31 | End: 2024-01-31

## 2024-01-31 RX ORDER — EPINEPHRINE 1 MG/ML
INJECTION, SOLUTION, CONCENTRATE INTRAVENOUS
Status: DISPENSED
Start: 2024-01-31 | End: 2024-01-31

## 2024-01-31 RX ORDER — DOCUSATE SODIUM 100 MG/1
200 CAPSULE, LIQUID FILLED ORAL DAILY
Status: DISCONTINUED | OUTPATIENT
Start: 2024-02-01 | End: 2024-02-02 | Stop reason: HOSPADM

## 2024-01-31 RX ORDER — ONDANSETRON HYDROCHLORIDE 2 MG/ML
4 INJECTION, SOLUTION INTRAVENOUS EVERY 6 HOURS PRN
Status: DISCONTINUED | OUTPATIENT
Start: 2024-01-31 | End: 2024-02-02 | Stop reason: HOSPADM

## 2024-01-31 RX ORDER — PROPOFOL 10 MG/ML
VIAL (ML) INTRAVENOUS
Status: DISCONTINUED | OUTPATIENT
Start: 2024-01-31 | End: 2024-01-31

## 2024-01-31 RX ORDER — ALUMINUM HYDROXIDE, MAGNESIUM HYDROXIDE, AND SIMETHICONE 1200; 120; 1200 MG/30ML; MG/30ML; MG/30ML
30 SUSPENSION ORAL EVERY 6 HOURS PRN
Status: DISCONTINUED | OUTPATIENT
Start: 2024-01-31 | End: 2024-02-02 | Stop reason: HOSPADM

## 2024-01-31 RX ORDER — KETOROLAC TROMETHAMINE 30 MG/ML
15 INJECTION, SOLUTION INTRAMUSCULAR; INTRAVENOUS EVERY 6 HOURS
Status: DISCONTINUED | OUTPATIENT
Start: 2024-01-31 | End: 2024-01-31

## 2024-01-31 RX ORDER — AMOXICILLIN 250 MG
2 CAPSULE ORAL 2 TIMES DAILY
Status: DISCONTINUED | OUTPATIENT
Start: 2024-01-31 | End: 2024-02-02 | Stop reason: HOSPADM

## 2024-01-31 RX ORDER — GLYCOPYRROLATE 0.2 MG/ML
INJECTION INTRAMUSCULAR; INTRAVENOUS
Status: DISCONTINUED | OUTPATIENT
Start: 2024-01-31 | End: 2024-01-31

## 2024-01-31 RX ORDER — ACETAMINOPHEN 500 MG
1000 TABLET ORAL
Status: COMPLETED | OUTPATIENT
Start: 2024-01-31 | End: 2024-01-31

## 2024-01-31 RX ORDER — KETAMINE HYDROCHLORIDE 100 MG/ML
INJECTION, SOLUTION INTRAMUSCULAR; INTRAVENOUS
Status: DISCONTINUED | OUTPATIENT
Start: 2024-01-31 | End: 2024-01-31

## 2024-01-31 RX ORDER — KETOROLAC TROMETHAMINE 10 MG/1
10 TABLET, FILM COATED ORAL
Status: DISCONTINUED | OUTPATIENT
Start: 2024-01-31 | End: 2024-01-31 | Stop reason: HOSPADM

## 2024-01-31 RX ORDER — HYDROCODONE BITARTRATE AND ACETAMINOPHEN 5; 325 MG/1; MG/1
1 TABLET ORAL EVERY 4 HOURS PRN
Status: DISCONTINUED | OUTPATIENT
Start: 2024-01-31 | End: 2024-01-31

## 2024-01-31 RX ORDER — SODIUM CHLORIDE 9 MG/ML
INJECTION, SOLUTION INTRAMUSCULAR; INTRAVENOUS; SUBCUTANEOUS
Status: DISCONTINUED | OUTPATIENT
Start: 2024-01-31 | End: 2024-01-31 | Stop reason: HOSPADM

## 2024-01-31 RX ORDER — ROPIVACAINE HYDROCHLORIDE 5 MG/ML
INJECTION, SOLUTION EPIDURAL; INFILTRATION; PERINEURAL
Status: DISPENSED
Start: 2024-01-31 | End: 2024-01-31

## 2024-01-31 RX ORDER — SODIUM CHLORIDE 9 MG/ML
INJECTION, SOLUTION INTRAVENOUS CONTINUOUS
Status: DISCONTINUED | OUTPATIENT
Start: 2024-01-31 | End: 2024-02-02 | Stop reason: HOSPADM

## 2024-01-31 RX ORDER — SODIUM CHLORIDE, SODIUM GLUCONATE, SODIUM ACETATE, POTASSIUM CHLORIDE AND MAGNESIUM CHLORIDE 30; 37; 368; 526; 502 MG/100ML; MG/100ML; MG/100ML; MG/100ML; MG/100ML
INJECTION, SOLUTION INTRAVENOUS CONTINUOUS
Status: DISCONTINUED | OUTPATIENT
Start: 2024-01-31 | End: 2024-01-31

## 2024-01-31 RX ORDER — EPHEDRINE SULFATE 50 MG/ML
INJECTION, SOLUTION INTRAVENOUS
Status: DISCONTINUED | OUTPATIENT
Start: 2024-01-31 | End: 2024-01-31

## 2024-01-31 RX ORDER — SODIUM CHLORIDE 0.9 % (FLUSH) 0.9 %
SYRINGE (ML) INJECTION
Status: DISPENSED
Start: 2024-01-31 | End: 2024-01-31

## 2024-01-31 RX ORDER — TAMSULOSIN HYDROCHLORIDE 0.4 MG/1
0.4 CAPSULE ORAL EVERY MORNING
Status: DISCONTINUED | OUTPATIENT
Start: 2024-02-01 | End: 2024-02-02 | Stop reason: HOSPADM

## 2024-01-31 RX ORDER — MIDAZOLAM HYDROCHLORIDE 1 MG/ML
INJECTION INTRAMUSCULAR; INTRAVENOUS
Status: DISCONTINUED | OUTPATIENT
Start: 2024-01-31 | End: 2024-01-31

## 2024-01-31 RX ORDER — ONDANSETRON HYDROCHLORIDE 2 MG/ML
INJECTION, SOLUTION INTRAVENOUS
Status: DISCONTINUED | OUTPATIENT
Start: 2024-01-31 | End: 2024-01-31

## 2024-01-31 RX ORDER — ACETAMINOPHEN 10 MG/ML
1000 INJECTION, SOLUTION INTRAVENOUS ONCE
Status: COMPLETED | OUTPATIENT
Start: 2024-01-31 | End: 2024-01-31

## 2024-01-31 RX ORDER — ACETAMINOPHEN 500 MG
500 TABLET ORAL EVERY 4 HOURS
Status: DISCONTINUED | OUTPATIENT
Start: 2024-02-01 | End: 2024-02-01

## 2024-01-31 RX ORDER — NAPROXEN SODIUM 220 MG/1
81 TABLET, FILM COATED ORAL 2 TIMES DAILY
Status: DISCONTINUED | OUTPATIENT
Start: 2024-02-01 | End: 2024-02-02 | Stop reason: HOSPADM

## 2024-01-31 RX ORDER — GABAPENTIN 300 MG/1
300 CAPSULE ORAL NIGHTLY
Status: DISCONTINUED | OUTPATIENT
Start: 2024-01-31 | End: 2024-02-02 | Stop reason: HOSPADM

## 2024-01-31 RX ORDER — BICALUTAMIDE 50 MG/1
50 TABLET, FILM COATED ORAL NIGHTLY
Status: DISCONTINUED | OUTPATIENT
Start: 2024-01-31 | End: 2024-02-02 | Stop reason: HOSPADM

## 2024-01-31 RX ORDER — TRAMADOL HYDROCHLORIDE 50 MG/1
50 TABLET ORAL EVERY 4 HOURS PRN
Status: DISCONTINUED | OUTPATIENT
Start: 2024-01-31 | End: 2024-01-31

## 2024-01-31 RX ORDER — ISOSORBIDE MONONITRATE 30 MG/1
30 TABLET, EXTENDED RELEASE ORAL NIGHTLY
Status: DISCONTINUED | OUTPATIENT
Start: 2024-01-31 | End: 2024-02-02 | Stop reason: HOSPADM

## 2024-01-31 RX ORDER — GABAPENTIN 300 MG/1
300 CAPSULE ORAL
Status: COMPLETED | OUTPATIENT
Start: 2024-01-31 | End: 2024-01-31

## 2024-01-31 RX ORDER — HYDROCODONE BITARTRATE AND ACETAMINOPHEN 5; 325 MG/1; MG/1
1 TABLET ORAL EVERY 4 HOURS PRN
Status: DISCONTINUED | OUTPATIENT
Start: 2024-01-31 | End: 2024-02-02 | Stop reason: HOSPADM

## 2024-01-31 RX ORDER — METOCLOPRAMIDE HYDROCHLORIDE 5 MG/ML
10 INJECTION INTRAMUSCULAR; INTRAVENOUS
Status: DISCONTINUED | OUTPATIENT
Start: 2024-01-31 | End: 2024-02-01

## 2024-01-31 RX ORDER — SCOLOPAMINE TRANSDERMAL SYSTEM 1 MG/1
1 PATCH, EXTENDED RELEASE TRANSDERMAL ONCE AS NEEDED
Status: DISCONTINUED | OUTPATIENT
Start: 2024-01-31 | End: 2024-01-31 | Stop reason: HOSPADM

## 2024-01-31 RX ORDER — HYDROMORPHONE HYDROCHLORIDE 2 MG/ML
0.4 INJECTION, SOLUTION INTRAMUSCULAR; INTRAVENOUS; SUBCUTANEOUS EVERY 5 MIN PRN
Status: DISCONTINUED | OUTPATIENT
Start: 2024-01-31 | End: 2024-01-31 | Stop reason: HOSPADM

## 2024-01-31 RX ADMIN — ISOSORBIDE MONONITRATE 30 MG: 30 TABLET, EXTENDED RELEASE ORAL at 08:01

## 2024-01-31 RX ADMIN — GLYCOPYRROLATE 0.2 MG: 0.2 INJECTION INTRAMUSCULAR; INTRAVENOUS at 11:01

## 2024-01-31 RX ADMIN — KETAMINE HYDROCHLORIDE 10 MG: 100 INJECTION, SOLUTION INTRAMUSCULAR; INTRAVENOUS at 12:01

## 2024-01-31 RX ADMIN — SENNOSIDES AND DOCUSATE SODIUM 2 TABLET: 50; 8.6 TABLET ORAL at 08:01

## 2024-01-31 RX ADMIN — ONDANSETRON HYDROCHLORIDE 4 MG: 2 SOLUTION INTRAMUSCULAR; INTRAVENOUS at 12:01

## 2024-01-31 RX ADMIN — METOCLOPRAMIDE 10 MG: 5 INJECTION, SOLUTION INTRAMUSCULAR; INTRAVENOUS at 06:01

## 2024-01-31 RX ADMIN — PROPOFOL 50 MG: 10 INJECTION, EMULSION INTRAVENOUS at 11:01

## 2024-01-31 RX ADMIN — HYDROCODONE BITARTRATE AND ACETAMINOPHEN 1 TABLET: 5; 325 TABLET ORAL at 06:01

## 2024-01-31 RX ADMIN — GABAPENTIN 300 MG: 300 CAPSULE ORAL at 09:01

## 2024-01-31 RX ADMIN — ACETAMINOPHEN 1000 MG: 10 INJECTION INTRAVENOUS at 08:01

## 2024-01-31 RX ADMIN — CEFAZOLIN 2 G: 2 INJECTION, POWDER, FOR SOLUTION INTRAMUSCULAR; INTRAVENOUS at 06:01

## 2024-01-31 RX ADMIN — FAMOTIDINE 20 MG: 20 TABLET ORAL at 08:01

## 2024-01-31 RX ADMIN — PHENYLEPHRINE HYDROCHLORIDE 0.8 MCG/KG/MIN: 10 INJECTION INTRAVENOUS at 11:01

## 2024-01-31 RX ADMIN — SODIUM CHLORIDE, SODIUM GLUCONATE, SODIUM ACETATE, POTASSIUM CHLORIDE AND MAGNESIUM CHLORIDE: 526; 502; 368; 37; 30 INJECTION, SOLUTION INTRAVENOUS at 11:01

## 2024-01-31 RX ADMIN — SODIUM CHLORIDE, SODIUM GLUCONATE, SODIUM ACETATE, POTASSIUM CHLORIDE AND MAGNESIUM CHLORIDE: 526; 502; 368; 37; 30 INJECTION, SOLUTION INTRAVENOUS at 12:01

## 2024-01-31 RX ADMIN — SODIUM CHLORIDE, SODIUM GLUCONATE, SODIUM ACETATE, POTASSIUM CHLORIDE AND MAGNESIUM CHLORIDE: 526; 502; 368; 37; 30 INJECTION, SOLUTION INTRAVENOUS at 09:01

## 2024-01-31 RX ADMIN — KETAMINE HYDROCHLORIDE 20 MG: 100 INJECTION, SOLUTION INTRAMUSCULAR; INTRAVENOUS at 11:01

## 2024-01-31 RX ADMIN — PHENYLEPHRINE HYDROCHLORIDE 200 MCG: 10 INJECTION INTRAVENOUS at 12:01

## 2024-01-31 RX ADMIN — TRANEXAMIC ACID 1950 MG: 650 TABLET ORAL at 09:01

## 2024-01-31 RX ADMIN — BUPIVACAINE HYDROCHLORIDE 1.8 ML: 7.5 INJECTION, SOLUTION EPIDURAL; RETROBULBAR at 11:01

## 2024-01-31 RX ADMIN — METOCLOPRAMIDE 10 MG: 5 INJECTION, SOLUTION INTRAMUSCULAR; INTRAVENOUS at 10:01

## 2024-01-31 RX ADMIN — HYDROCODONE BITARTRATE AND ACETAMINOPHEN 1 TABLET: 5; 325 TABLET ORAL at 11:01

## 2024-01-31 RX ADMIN — POLYETHYLENE GLYCOL 3350 17 G: 17 POWDER, FOR SOLUTION ORAL at 08:01

## 2024-01-31 RX ADMIN — ACETAMINOPHEN 1000 MG: 500 TABLET ORAL at 09:01

## 2024-01-31 RX ADMIN — CEFAZOLIN 2 G: 2 INJECTION, POWDER, FOR SOLUTION INTRAMUSCULAR; INTRAVENOUS at 11:01

## 2024-01-31 RX ADMIN — SERTRALINE HYDROCHLORIDE 50 MG: 50 TABLET ORAL at 08:01

## 2024-01-31 RX ADMIN — GABAPENTIN 300 MG: 300 CAPSULE ORAL at 08:01

## 2024-01-31 RX ADMIN — MIDAZOLAM 2 MG: 1 INJECTION INTRAMUSCULAR; INTRAVENOUS at 11:01

## 2024-01-31 RX ADMIN — ONDANSETRON 4 MG: 4 TABLET, ORALLY DISINTEGRATING ORAL at 09:01

## 2024-01-31 RX ADMIN — LIDOCAINE HYDROCHLORIDE 20 MG: 10 INJECTION, SOLUTION EPIDURAL; INFILTRATION; INTRACAUDAL; PERINEURAL at 11:01

## 2024-01-31 RX ADMIN — LIDOCAINE HYDROCHLORIDE 100 MG: 20 INJECTION INTRAVENOUS at 11:01

## 2024-01-31 RX ADMIN — EPHEDRINE SULFATE 50 MG: 50 INJECTION INTRAVENOUS at 12:01

## 2024-01-31 RX ADMIN — SODIUM CHLORIDE: 9 INJECTION, SOLUTION INTRAVENOUS at 02:01

## 2024-01-31 NOTE — TRANSFER OF CARE
Anesthesia Transfer of Care Note    Patient: Rene Baer    Procedure(s) Performed: Procedure(s) (LRB):  ROBOTIC ARTHROPLASTY, HIP, TOTAL, POSTERIOR APPROACH (Right)    Patient location: PACU    Anesthesia Type: spinal    Transport from OR: Transported from OR on room air with adequate spontaneous ventilation    Post pain: adequate analgesia    Post assessment: no apparent anesthetic complications    Post vital signs: stable    Level of consciousness: responds to stimulation    Nausea/Vomiting: no nausea/vomiting    Complications: none    Transfer of care protocol was followed      Last vitals: Visit Vitals  BP (!) 143/65   Pulse 79   Temp 36.6 °C (97.9 °F)   Resp 20   Ht 6' (1.829 m)   Wt 102 kg (224 lb 13.9 oz)   SpO2 100%   BMI 30.50 kg/m²

## 2024-01-31 NOTE — OP NOTE
OPERATIVE REPORT      Patient: Rene Baer   : 1950    MRN: [id number]  Date: 2024      Surgeon: Tono Farah MD  Assistant: Ashanti Xiong NP, [facility].  Certified first assist was necessary as a skilled set of hands and was necessary for proper patient positioning as well as assistance with closure in place with multiple implants.  Preoperative Diagnosis:  Right hip primary osteoarthritis  Postoperative Diagnosis: Right Hip primary osteoarthritis  Procedure:  Right Ashwin total hip arthroplasty (57890)  Anesthesiologist: No responsible provider has been recorded for the case.  OR Staff: Circulator: Tiffanie Espino RN  Nurse Practitioner: Ashanti Xiong FNP  Scrub Person: Efren Choudhury ST  Implants:   Implant Name Type Inv. Item Serial No.  Lot No. LRB No. Used Action   PIN BONE 4 X 140MM STERILE - [id number]  PIN BONE 4 X 140MM STERILE  ASHWIN [employer]  Right 2 Implanted and Explanted   SHELL TRIDENT II ACET F 58MM - [id number]  SHELL TRIDENT II ACET F 58MM  [employer]. [id number] Right 1 Implanted   INSERT TRIDENT X3 0 DEG 36MM F - [id number]  INSERT TRIDENT X3 0 DEG 36MM F  [employer]. [id number] Right 1 Implanted   STEM FEM ACCOLADE2 SZ6 35X111 - [id number]  STEM FEM ACCOLADE2 SZ6 35X111  [employer]. [id number] Right 1 Implanted   HEAD FEM V40 36MM +0MM BIO - [id number]  HEAD FEM V40 36MM +0MM BIO  [employer]. [id number] Right 1 Implanted     EBL: 200  Complications: None  Disposition:  To PACU stable     Indications: Rene Baer is a 73 y.o. male presenting with ongoing complaints of right hip pain.  Patient had tried and failed all conservative management including anti-inflammatories, activity modification, the use of a cane, home exercise program.  Despite this he continued to have significant complaints of right hip pain.  Patient felt as though we reached a point of  disability with regards to his right hip and would like to proceed with total hip arthroplasty.  The risks, benefits, alternatives to total hip arthroplasty were discussed in detail with the patient.  These include but are limited to infection, bleeding, scarring, need for revision surgery, included resolution of pain, DVT, PE, death.  Despite these risks, he elected to proceed with surgical intervention.  All questions answered to the patient's satisfaction.  No guarantees made.  The patient voiced understanding and written as well as verbal consent was obtained by myself prior to the procedure.    Procedure Note:  The patient was seen in the preoperative holding area.  He was marked and consented for surgery.  Again all questions were answered.  Taken the operating room was placed under anesthesia.  Placed in the left lateral decubitus position with all bony prominences well padded.  Right hip prepped draped normal sterile fashion.  A time-out was performed verifying correct patient site side and procedure.  All were in agreement.  Preoperative antibiotics were administered as well as TXA.    Three Kal pins were attached percutaneously to the iliac crest.  [employer] robotics array was placed in appropriate position.  Standard direct superior approach to the hip was performed centered on the posterior 3rd of the greater trochanter.  Taken down through the skin and subcutaneous tissue down the fascia.  Fascia was split in line with the fibers.  Charnley retractor was placed.  Check point placed in the greater trochanter.  Piriformis tendon was identified.  Arthrotomy was made on the superior aspect of the piriformis and taken distally through the superior aspect of the short external rotators in a hockey-stick style fashion.  Hip was internally rotated and dislocated.  Sagittal saw was used to make an osteotomy 1-2 fingerbreadths proximal to the level of lesser trochanter.  The retractors placed around the acetabulum  and excellent exposure was had.  Removed the labrum as well pulvinar.  Registered the acetabulum using Ashwin protocol with excellent registration.  Brought in the Ashwin robot and reamed to a 58 Reamer.  We then impacted a 58 Trident II acetabulum component into position found to be down.   Acetabulum was impacted with 40° of inclination and 20° of anteversion.  We then impacted the liner into position and found it to be down.    Attention then turned to the femur.  Hip was internally rotated exposing the femur.  Retractors were placed.  Cookie cutter used to take out the lateral femoral neck.  Canal finder to open up the femoral canal.  We then broached sequentially up to a size 6 broach.  This came to a firm and solid stop with a change in pitch.  We then trialed at this point using a trial femoral neck and head.  Hip was taken through full range of motion and found to be stable with no anterior or posterior instability and good recreation of leg lengths as well as offset.  This was confirmed using measurements from the Ashwin robot.  We then dislocated the hip confirm stability of femoral broach.  We then opened the implant impacted the a 6 femoral component position.  It came down to the previous level of resection with again a firm solid stop. 36+0 Biolox head was opened and impacted onto the femoral trunnion after cleaning and drying the trunnion.  Hip was reduced and again taken to full range of motion.  Excellent recreation of leg length was felt and no anterior or posterior instability found.    Within turned our attention toward closure.  Wound was copiously irrigated with normal saline followed by Betadine lavage and more normal saline.  We closed the piriformis tendon as well as the short external rotators back to the greater trochanter through drill tunnels using #2 FiberWire sutures.  We injected our joint cocktail.  Fascia closed with #1 Stratafix suture in running fashion.  2-0 Monocryl on the  subcutaneous tissue followed by skin closure.    All sponge and needle counts were correct at the end of the case.  I was present and participated in all key and critical aspects of the procedure.    Prognosis:  Patient arose from anesthesia without any issues.  Was transferred to recovery room stable condition.  Postoperatively weight-bearing as tolerated to the left lower extremity with appropriate hip precautions.  Plan to discharge home versus rehab pending physical therapy evaluation.    This note/OR report was created with the assistance of  voice recognition software or phone  dictation.  There may be transcription errors as a result of using this technology however minimal. Effort has been made to assure accuracy of transcription but any obvious errors or omissions should be clarified with the author of the document.

## 2024-01-31 NOTE — PLAN OF CARE
Problem: Adult Inpatient Plan of Care  Goal: Plan of Care Review  Outcome: Ongoing, Progressing  Goal: Patient-Specific Goal (Individualized)  Outcome: Ongoing, Progressing  Goal: Absence of Hospital-Acquired Illness or Injury  Outcome: Ongoing, Progressing  Goal: Optimal Comfort and Wellbeing  Outcome: Ongoing, Progressing  Goal: Readiness for Transition of Care  Outcome: Ongoing, Progressing     Problem: Infection  Goal: Absence of Infection Signs and Symptoms  Outcome: Ongoing, Progressing     Problem: Hypertension Comorbidity  Goal: Blood Pressure in Desired Range  Outcome: Ongoing, Progressing     Problem: Obstructive Sleep Apnea Risk or Actual Comorbidity Management  Goal: Unobstructed Breathing During Sleep  Outcome: Ongoing, Progressing     Problem: Fall Injury Risk  Goal: Absence of Fall and Fall-Related Injury  Outcome: Ongoing, Progressing     Problem: Adjustment to Surgery (Hip Arthroplasty)  Goal: Optimal Coping  Outcome: Ongoing, Progressing     Problem: Bleeding (Hip Arthroplasty)  Goal: Absence of Bleeding  Outcome: Ongoing, Progressing     Problem: Bowel Motility Impaired (Hip Arthroplasty)  Goal: Effective Bowel Elimination  Outcome: Ongoing, Progressing     Problem: Fluid and Electrolyte Imbalance (Hip Arthroplasty)  Goal: Fluid and Electrolyte Balance  Outcome: Ongoing, Progressing     Problem: Functional Ability Impaired (Hip Arthroplasty)  Goal: Optimal Functional Ability  Outcome: Ongoing, Progressing     Problem: Infection (Hip Arthroplasty)  Goal: Absence of Infection Signs and Symptoms  Outcome: Ongoing, Progressing     Problem: Neurovascular Compromise (Hip Arthroplasty)  Goal: Intact Neurovascular Status  Outcome: Ongoing, Progressing     Problem: Ongoing Anesthesia Effects (Hip Arthroplasty)  Goal: Anesthesia/Sedation Recovery  Outcome: Ongoing, Progressing     Problem: Pain (Hip Arthroplasty)  Goal: Acceptable Pain Control  Outcome: Ongoing, Progressing     Problem: Postoperative  Nausea and Vomiting (Hip Arthroplasty)  Goal: Nausea and Vomiting Relief  Outcome: Ongoing, Progressing     Problem: Postoperative Urinary Retention (Hip Arthroplasty)  Goal: Effective Urinary Elimination  Outcome: Ongoing, Progressing     Problem: Respiratory Compromise (Hip Arthroplasty)  Goal: Effective Oxygenation and Ventilation  Outcome: Ongoing, Progressing

## 2024-01-31 NOTE — ANESTHESIA PROCEDURE NOTES
Spinal    Diagnosis: Rt hip osteoarthritis  Patient location during procedure: OR  Start time: 1/31/2024 11:22 AM  Timeout: 1/31/2024 11:21 AM  End time: 1/31/2024 11:25 AM    Staffing  Authorizing Provider: Bronson Gomez MD  Performing Provider: Bronson Gomez MD    Staffing  Performed by: Bronson Gomez MD  Authorized by: Bronson Gomez MD    Preanesthetic Checklist  Completed: patient identified, IV checked, site marked, risks and benefits discussed, surgical consent, monitors and equipment checked, pre-op evaluation and timeout performed  Spinal Block  Patient position: sitting  Prep: ChloraPrep  Patient monitoring: heart rate, cardiac monitor, continuous pulse ox and frequent blood pressure checks  Location: L3-4  Injection technique: single shot  CSF Fluid: clear free-flowing CSF  Needle  Needle gauge: 25 G  Needle length: 3.5 in  Additional Documentation: incremental injection, negative aspiration for heme and no paresthesia on injection  Needle localization: anatomical landmarks  Assessment  Sensory level: T6   Dermatomal levels determined by pinch or prick  Ease of block: moderate  Patient's tolerance of the procedure: comfortable throughout block and no complaints  Medications:    Medications: bupivacaine (pf) (MARCAINE) injection 0.75% - Intraspinal   1.8 mL - 1/31/2024 11:25:00 AM  lidocaine (PF) injection 1% - Infiltration, Back   20 mg - 1/31/2024 11:22:00 AM

## 2024-01-31 NOTE — PT/OT/SLP EVAL
Physical Therapy Evaluation    Patient Name:  Rene Baer   MRN:  [id number]    Recommendations:     Discharge Recommendations: Low Intensity Therapy   Discharge Equipment Recommendations: walker, rolling   Barriers to discharge: None    Assessment:     Rene Baer is a 73 y.o. male admitted with a medical diagnosis of Primary osteoarthritis of right hip.  He presents with the following impairments/functional limitations: weakness, impaired endurance, impaired functional mobility, decreased lower extremity function, pain, decreased ROM, edema, orthopedic precautions.    Rehab Prognosis: Fair; patient would benefit from acute skilled PT services to address these deficits and reach maximum level of function.    Recent Surgery: Procedure(s) (LRB):  ROBOTIC ARTHROPLASTY, HIP, TOTAL, POSTERIOR APPROACH (Right) Day of Surgery    Plan:     During this hospitalization, patient to be seen BID to address the identified rehab impairments via gait training, therapeutic activities, therapeutic exercises and progress toward the following goals:    Plan of Care Expires:  02/06/24    Subjective     Chief Complaint: R hip pain  Patient/Family Comments/goals:   Pain/Comfort:  Location - Side 1: Right  Location 1: hip  Pain Addressed 1: Pre-medicate for activity, Reposition, Distraction, Cessation of Activity    Patients cultural, spiritual, Religion conflicts given the current situation:      Living Environment:  Pt lives in single story home alone typically but will be staying with girlfriend in [place] during recovery, no steps to enter home.  Prior to admission, patients level of function was mod ind.  Equipment used at home: walker, rolling.  DME owned (not currently used): none.  Upon discharge, patient will have assistance from girlfriend.    Objective:     Communicated with nurse prior to session.  Patient found supine with peripheral IV  upon PT entry to room.    General Precautions: Standard, fall  Orthopedic  Precautions:RLE weight bearing as tolerated   Braces:    Respiratory Status: Room air    Exams:  RLE ROM: NT dt sx side  RLE Strength: Nt dt sx side  LLE ROM: WFL  LLE Strength: WFL    Functional Mobility:  Bed Mobility:     Supine to Sit: stand by assistance  Transfers:     Sit to Stand:  contact guard assistance with rolling walker  Bed to Chair: minimum assistance with  rolling walker  using  Step Transfer; pt took 4 steps forward and reported continued numbness in RLE, able to pivot to chair with minimum assistance          Treatment & Education:  Pt edu on total hip precautions and partial WB status  Pt did not complete prehab prior to sx    Patient left up in chair with all lines intact, call button in reach, and nurse notified.    GOALS:   Multidisciplinary Problems       Physical Therapy Goals          Problem: Physical Therapy    Goal Priority Disciplines Outcome Goal Variances Interventions   Physical Therapy Goal     PT, PT/OT Ongoing, Progressing     Description: Pt will improve functional independence by performing:    Bed mobility: SBA  Sit to stand: SBA with rolling walker  Bed to chair t/f: SBA with Stand Step  with rolling walker  Ambulation x 200'  with SBA with rolling walker  1 Step (Curb): Min A  with rolling walker  3 Steps: Min A  with B HR  Independent with total hip HEP                        History:     Past Medical History:   Diagnosis Date    Adjustment disorder     Bilateral carpal tunnel syndrome     CAD (coronary artery disease)     Cervical radiculopathy     Chronic pain syndrome     CKD (chronic kidney disease)     Gout     HTN (hypertension)     Lumbar radiculopathy     Osteoarthritis     Pacemaker     Prostate cancer     Sleep apnea     Does not sleep with c-pap    Unspecified glaucoma        Past Surgical History:   Procedure Laterality Date    APPENDECTOMY      ARTHROCENTESIS OF HIP JOINT Right 03/29/2023    Procedure: R hip injection;  Surgeon: Tono Farah MD;  Location:  [facility] OR;  Service: Orthopedics;  Laterality: Right;    Cataract surgery Bilateral     CERVICAL FUSION      COLONOSCOPY N/A 08/25/2022    Procedure: COLONOSCOPY;  Surgeon: Luis Felipe Mckeon III, MD;  Location: [facility];  Service: Endoscopy;  Laterality: N/A;    EPIDURAL STEROID INJECTION INTO CERVICAL SPINE      EPIDURAL STEROID INJECTION INTO LUMBAR SPINE      Fluoroscopy of spinal cord      INJECTION OF JOINT Right 07/27/2022    Procedure: Injection, Joint, Hip;  Surgeon: Tono Farah MD;  Location: [facility];  Service: Orthopedics;  Laterality: Right;    INSERTION OF PERMANENT PACEMAKER      INTRAOPERATIVE RADIATION THERAPY      LUNG REMOVAL, PARTIAL      Myelogram      PENILE PROSTHESIS IMPLANT         Time Tracking:     PT Received On:    PT Start Time: 1634     PT Stop Time: 1650  PT Total Time (min): 16 min     Billable Minutes: Evaluation 16 01/31/2024

## 2024-01-31 NOTE — ANESTHESIA PREPROCEDURE EVALUATION
01/31/2024  Rene Baer is a 73 y.o., male, who presents for the following:    Procedure: ROBOTIC ARTHROPLASTY, HIP, TOTAL, POSTERIOR APPROACH (Right)   Anesthesia type: Spinal   Diagnosis: Primary osteoarthritis of right hip [M16.11]   Pre-op diagnosis: Primary osteoarthritis of right hip [M16.11]   Location: [facility] OR  / [facility] OR   Surgeons: Tono Farah MD     HT 6'    LAB:        EKG: NSR / LVH    Pre-op Assessment    I have reviewed the Patient Summary Reports.     I have reviewed the Nursing Notes. I have reviewed the NPO Status.   I have reviewed the Medications.     Review of Systems  Anesthesia Hx:  No problems with previous Anesthesia             Denies Family Hx of Anesthesia complications.    Denies Personal Hx of Anesthesia complications.                    Social:  Non-Smoker       Cardiovascular:     Hypertension   CAD               Pacemaker                         Pulmonary:        Sleep Apnea                Renal/:  Chronic Renal Disease, CKD                Musculoskeletal:  Arthritis               Neurological:           Cervical and Lumbar Radiculopathy                            Endocrine:  Endocrine Normal                Physical Exam  General: Alert and Oriented    Airway:  Mallampati: II   Mouth Opening: Normal  TM Distance: Normal  Tongue: Normal  Neck ROM: Normal ROM    Dental:  Intact  Partial Bridges removed  Chest/Lungs:  Normal Respiratory Rate    Heart:  Rate: Normal  Rhythm: Regular Rhythm        Anesthesia Plan  Type of Anesthesia, risks & benefits discussed:    Anesthesia Type: Gen Natural Airway, Spinal, Regional  Intra-op Monitoring Plan: Standard ASA Monitors  Post Op Pain Control Plan: IV/PO Opioids PRN  Induction:  IV  Airway Plan: Direct  Informed Consent: Informed consent signed with the Patient and all parties understand the risks and agree with anesthesia  plan.  All questions answered. Patient consented to blood products? No  ASA Score: 3  Day of Surgery Review of History & Physical: H&P Update referred to the surgeon/provider.  Anesthesia Plan Notes: Nasal cannula vs facemask supplemental oxygenation   For patients with JAVIER/obesity, may consider SuperNoval Nasal CPAP  Spinal Block w/ Propofol sedation    Ready For Surgery From Anesthesia Perspective.     .

## 2024-01-31 NOTE — ANESTHESIA POSTPROCEDURE EVALUATION
Anesthesia Post Evaluation    Patient: Rene Baer    Procedure(s) Performed: Procedure(s) (LRB):  ROBOTIC ARTHROPLASTY, HIP, TOTAL, POSTERIOR APPROACH (Right)    Final Anesthesia Type: spinal      Patient location during evaluation: PACU  Patient participation: Yes- Able to Participate  Level of consciousness: oriented and sedated  Post-procedure vital signs: reviewed and stable  Pain management: adequate  Airway patency: patent    PONV status at discharge: No PONV  Anesthetic complications: no      Cardiovascular status: hemodynamically stable  Respiratory status: spontaneous ventilation and unassisted  Hydration status: euvolemic  Follow-up not needed.  Comments: [facility]          NEURO: Neuraxial block resolving    Vitals Value Taken Time   /59 01/31/24 1315   Temp 36.6 °C (97.9 °F) 01/31/24 1255   Pulse 65 01/31/24 1317   Resp 17 01/31/24 1317   SpO2 100 % 01/31/24 1317   Vitals shown include unvalidated device data.      No case tracking events are documented in the log.      Pain/Jarad Score: Pain Rating Prior to Med Admin: 8 (1/31/2024  9:30 AM)  Jarad Score: 8 (1/31/2024  1:04 PM)

## 2024-01-31 NOTE — NURSING
Nurses Note -- 4 Eyes      1/31/2024   4:45 PM      Skin assessed during: Admit      [x] No Altered Skin Integrity Present    []Prevention Measures Documented      [] Yes- Altered Skin Integrity Present or Discovered   [] LDA Added if Not in Epic (Describe Wound)   [] New Altered Skin Integrity was Present on Admit and Documented in LDA   [] Wound Image Taken    Wound Care Consulted? No    Attending Nurse:  Nyla Irvin RN/Staff Member:   Reshma

## 2024-02-01 LAB
ABO + RH BLD: NORMAL
ABO + RH BLD: NORMAL
ABORH RETYPE: NORMAL
ANION GAP SERPL CALC-SCNC: 5 MEQ/L
BLD PROD TYP BPU: NORMAL
BLD PROD TYP BPU: NORMAL
BLOOD UNIT EXPIRATION DATE: NORMAL
BLOOD UNIT EXPIRATION DATE: NORMAL
BLOOD UNIT TYPE CODE: 5100
BLOOD UNIT TYPE CODE: 5100
BUN SERPL-MCNC: 21.9 MG/DL (ref 8.4–25.7)
CALCIUM SERPL-MCNC: 8.6 MG/DL (ref 8.8–10)
CHLORIDE SERPL-SCNC: 107 MMOL/L (ref 98–107)
CO2 SERPL-SCNC: 25 MMOL/L (ref 23–31)
CREAT SERPL-MCNC: 1.38 MG/DL (ref 0.73–1.18)
CREAT/UREA NIT SERPL: 16
CROSSMATCH INTERPRETATION: NORMAL
CROSSMATCH INTERPRETATION: NORMAL
DISPENSE STATUS: NORMAL
DISPENSE STATUS: NORMAL
ERYTHROCYTE [DISTWIDTH] IN BLOOD BY AUTOMATED COUNT: 15.6 % (ref 11.5–17)
GFR SERPLBLD CREATININE-BSD FMLA CKD-EPI: 54 MLS/MIN/1.73/M2
GLUCOSE SERPL-MCNC: 119 MG/DL (ref 82–115)
GROUP & RH: NORMAL
HCT VFR BLD AUTO: 23.8 % (ref 42–52)
HGB BLD-MCNC: 7.7 G/DL (ref 14–18)
INDIRECT COOMBS: NORMAL
MCH RBC QN AUTO: 31.6 PG (ref 27–31)
MCHC RBC AUTO-ENTMCNC: 32.4 G/DL (ref 33–36)
MCV RBC AUTO: 97.5 FL (ref 80–94)
NRBC BLD AUTO-RTO: 0 %
PLATELET # BLD AUTO: 235 X10(3)/MCL (ref 130–400)
PMV BLD AUTO: 10.2 FL (ref 7.4–10.4)
POTASSIUM SERPL-SCNC: 5 MMOL/L (ref 3.5–5.1)
RBC # BLD AUTO: 2.44 X10(6)/MCL (ref 4.7–6.1)
SODIUM SERPL-SCNC: 137 MMOL/L (ref 136–145)
SPECIMEN OUTDATE: NORMAL
UNIT NUMBER: NORMAL
UNIT NUMBER: NORMAL
WBC # SPEC AUTO: 8.87 X10(3)/MCL (ref 4.5–11.5)

## 2024-02-01 PROCEDURE — 97166 OT EVAL MOD COMPLEX 45 MIN: CPT

## 2024-02-01 PROCEDURE — 94761 N-INVAS EAR/PLS OXIMETRY MLT: CPT

## 2024-02-01 PROCEDURE — 86920 COMPATIBILITY TEST SPIN: CPT | Performed by: ORTHOPAEDIC SURGERY

## 2024-02-01 PROCEDURE — 97530 THERAPEUTIC ACTIVITIES: CPT

## 2024-02-01 PROCEDURE — 86901 BLOOD TYPING SEROLOGIC RH(D): CPT | Performed by: ORTHOPAEDIC SURGERY

## 2024-02-01 PROCEDURE — 25000003 PHARM REV CODE 250: Performed by: ORTHOPAEDIC SURGERY

## 2024-02-01 PROCEDURE — 94799 UNLISTED PULMONARY SVC/PX: CPT | Mod: XB

## 2024-02-01 PROCEDURE — 99900035 HC TECH TIME PER 15 MIN (STAT)

## 2024-02-01 PROCEDURE — 85027 COMPLETE CBC AUTOMATED: CPT | Performed by: ORTHOPAEDIC SURGERY

## 2024-02-01 PROCEDURE — 97116 GAIT TRAINING THERAPY: CPT | Mod: CQ

## 2024-02-01 PROCEDURE — 25000003 PHARM REV CODE 250: Performed by: NURSE PRACTITIONER

## 2024-02-01 PROCEDURE — 30233N1 TRANSFUSION OF NONAUTOLOGOUS RED BLOOD CELLS INTO PERIPHERAL VEIN, PERCUTANEOUS APPROACH: ICD-10-PCS | Performed by: ORTHOPAEDIC SURGERY

## 2024-02-01 PROCEDURE — P9016 RBC LEUKOCYTES REDUCED: HCPCS | Performed by: ORTHOPAEDIC SURGERY

## 2024-02-01 PROCEDURE — 97530 THERAPEUTIC ACTIVITIES: CPT | Mod: CQ

## 2024-02-01 PROCEDURE — 11000001 HC ACUTE MED/SURG PRIVATE ROOM

## 2024-02-01 PROCEDURE — 80048 BASIC METABOLIC PNL TOTAL CA: CPT | Performed by: ORTHOPAEDIC SURGERY

## 2024-02-01 PROCEDURE — 63600175 PHARM REV CODE 636 W HCPCS: Performed by: ORTHOPAEDIC SURGERY

## 2024-02-01 PROCEDURE — 63600175 PHARM REV CODE 636 W HCPCS: Performed by: NURSE PRACTITIONER

## 2024-02-01 RX ORDER — CEFADROXIL 500 MG/1
500 CAPSULE ORAL EVERY 12 HOURS
Qty: 14 CAPSULE | Refills: 0 | Status: SHIPPED | OUTPATIENT
Start: 2024-02-01 | End: 2024-02-08

## 2024-02-01 RX ORDER — POLYETHYLENE GLYCOL 3350 17 G/17G
17 POWDER, FOR SOLUTION ORAL DAILY
Qty: 14 EACH | Refills: 0 | Status: SHIPPED | OUTPATIENT
Start: 2024-02-01 | End: 2024-02-15

## 2024-02-01 RX ORDER — METHOCARBAMOL 750 MG/1
750 TABLET, FILM COATED ORAL EVERY 6 HOURS PRN
Qty: 56 TABLET | Refills: 0 | Status: SHIPPED | OUTPATIENT
Start: 2024-02-01 | End: 2024-02-15

## 2024-02-01 RX ORDER — DIPHENHYDRAMINE HCL 25 MG
25 CAPSULE ORAL ONCE
Status: COMPLETED | OUTPATIENT
Start: 2024-02-01 | End: 2024-02-01

## 2024-02-01 RX ORDER — HYDROCODONE BITARTRATE AND ACETAMINOPHEN 500; 5 MG/1; MG/1
TABLET ORAL
Status: DISCONTINUED | OUTPATIENT
Start: 2024-02-01 | End: 2024-02-02 | Stop reason: HOSPADM

## 2024-02-01 RX ORDER — FUROSEMIDE 10 MG/ML
20 INJECTION INTRAMUSCULAR; INTRAVENOUS ONCE
Status: COMPLETED | OUTPATIENT
Start: 2024-02-01 | End: 2024-02-01

## 2024-02-01 RX ORDER — HYDROCODONE BITARTRATE AND ACETAMINOPHEN 10; 325 MG/1; MG/1
1 TABLET ORAL EVERY 4 HOURS PRN
Qty: 42 TABLET | Refills: 0 | Status: SHIPPED | OUTPATIENT
Start: 2024-02-01 | End: 2024-02-08

## 2024-02-01 RX ORDER — METOCLOPRAMIDE HYDROCHLORIDE 5 MG/ML
5 INJECTION INTRAMUSCULAR; INTRAVENOUS
Status: COMPLETED | OUTPATIENT
Start: 2024-02-01 | End: 2024-02-01

## 2024-02-01 RX ORDER — NAPROXEN SODIUM 220 MG/1
81 TABLET, FILM COATED ORAL 2 TIMES DAILY
Qty: 84 TABLET | Refills: 0 | Status: SHIPPED | OUTPATIENT
Start: 2024-02-01 | End: 2024-03-14

## 2024-02-01 RX ADMIN — METOCLOPRAMIDE 5 MG: 5 INJECTION, SOLUTION INTRAMUSCULAR; INTRAVENOUS at 09:02

## 2024-02-01 RX ADMIN — HYDROCODONE BITARTRATE AND ACETAMINOPHEN 1 TABLET: 7.5; 325 TABLET ORAL at 09:02

## 2024-02-01 RX ADMIN — METHOCARBAMOL 750 MG: 750 TABLET ORAL at 07:02

## 2024-02-01 RX ADMIN — FAMOTIDINE 20 MG: 20 TABLET ORAL at 08:02

## 2024-02-01 RX ADMIN — METOCLOPRAMIDE 5 MG: 5 INJECTION, SOLUTION INTRAMUSCULAR; INTRAVENOUS at 04:02

## 2024-02-01 RX ADMIN — METOCLOPRAMIDE 10 MG: 5 INJECTION, SOLUTION INTRAMUSCULAR; INTRAVENOUS at 04:02

## 2024-02-01 RX ADMIN — HYDROCODONE BITARTRATE AND ACETAMINOPHEN 1 TABLET: 7.5; 325 TABLET ORAL at 01:02

## 2024-02-01 RX ADMIN — FUROSEMIDE 20 MG: 10 INJECTION, SOLUTION INTRAMUSCULAR; INTRAVENOUS at 08:02

## 2024-02-01 RX ADMIN — DIPHENHYDRAMINE HYDROCHLORIDE 25 MG: 25 CAPSULE ORAL at 12:02

## 2024-02-01 RX ADMIN — GABAPENTIN 300 MG: 300 CAPSULE ORAL at 08:02

## 2024-02-01 RX ADMIN — METOCLOPRAMIDE 10 MG: 5 INJECTION, SOLUTION INTRAMUSCULAR; INTRAVENOUS at 10:02

## 2024-02-01 RX ADMIN — SENNOSIDES AND DOCUSATE SODIUM 2 TABLET: 50; 8.6 TABLET ORAL at 08:02

## 2024-02-01 RX ADMIN — HYDROCODONE BITARTRATE AND ACETAMINOPHEN 1 TABLET: 5; 325 TABLET ORAL at 04:02

## 2024-02-01 RX ADMIN — HYDROCODONE BITARTRATE AND ACETAMINOPHEN 1 TABLET: 5; 325 TABLET ORAL at 08:02

## 2024-02-01 RX ADMIN — ISOSORBIDE MONONITRATE 30 MG: 30 TABLET, EXTENDED RELEASE ORAL at 08:02

## 2024-02-01 RX ADMIN — ASPIRIN 81 MG CHEWABLE TABLET 81 MG: 81 TABLET CHEWABLE at 08:02

## 2024-02-01 RX ADMIN — SERTRALINE HYDROCHLORIDE 50 MG: 50 TABLET ORAL at 08:02

## 2024-02-01 RX ADMIN — CEFAZOLIN 2 G: 2 INJECTION, POWDER, FOR SOLUTION INTRAMUSCULAR; INTRAVENOUS at 05:02

## 2024-02-01 RX ADMIN — BICALUTAMIDE 50 MG: 50 TABLET ORAL at 08:02

## 2024-02-01 RX ADMIN — LISINOPRIL 20 MG: 10 TABLET ORAL at 08:02

## 2024-02-01 RX ADMIN — CEFAZOLIN 2 G: 2 INJECTION, POWDER, FOR SOLUTION INTRAMUSCULAR; INTRAVENOUS at 12:02

## 2024-02-01 RX ADMIN — DOCUSATE SODIUM 200 MG: 100 CAPSULE, LIQUID FILLED ORAL at 05:02

## 2024-02-01 RX ADMIN — HYDROCODONE BITARTRATE AND ACETAMINOPHEN 1 TABLET: 7.5; 325 TABLET ORAL at 05:02

## 2024-02-01 RX ADMIN — POLYETHYLENE GLYCOL 3350 17 G: 17 POWDER, FOR SOLUTION ORAL at 08:02

## 2024-02-01 RX ADMIN — TAMSULOSIN HYDROCHLORIDE 0.4 MG: 0.4 CAPSULE ORAL at 06:02

## 2024-02-01 NOTE — DISCHARGE INSTRUCTIONS
[facility]  [address]  [place] [address]  Phone [phone]       /      Fax [phone]  SURGEON: Dr. Farah    After discharge, all questions or concerns should be handled at your surgeon's office ([phone]). If it is a weekend or after hours, you will get the surgeon on call.     Discharge Medications:    PAIN MANAGEMENT: Next Dose Available   Robaxin/Methocarbamol 750mg (Muscle Relaxer) - Every 6-8 hours AS NEEDED for muscle spasms, thigh pain or additional pain control 315 pm   Norco 7.5/325mg (Hydrocodone/Acetaminophen) (Pain Med) - every 4-6 hours AS NEEDED for pain. DO NOT TAKE ZANAFLEX/TIZANIDINE. 210 pm   Neurontin/Gabapentin 300mg (Nerve/Shocking Pain) - ok to restart home regimen 8 pm   COMPLICATION PREVENTION MEDS: Next Dose DUE   Aspirin 81mg twice a day for 6 weeks post-op for blood clot prevention PM on 830 pm   MiraLAX 17gm - once or twice a day while on narcotics and muscle relaxers for constipation prevention PM on 830 pm   Duricef/Cefadroxil 500mg (Antibiotic) - twice a day for 7 days post-op for infection prevention PM on 830 pm       Total Hip Replacement                                                                                                                                  PAIN MEDICATIONS/PAIN MANAGEMENT: (Use the medication log in your discharge packet to keep track of your medications)  For best wound healing, NO anti-inflammatories (Ibuprofen, Aleve, Motrin, Naproxen, Mobic/Meloxicam, Toradol/Ketorolac, Celebrex, Diclofenac/Voltaren...etc) for 2 weeks or until the wound is healed.    Norco 7.5/325mg (Hydrocodone/Acetaminophen) (pain pill) - You can take 1 tablet every 4-6 hours for pain. If the pain is mild, take 1/2 of a pill. Once you start taking a half of a pain pill, you can take a Tylenol 325mg with each dose for a little extra pain relief without side effects. Gradually decrease the use as the pain lessens. As you  decrease the Norco, increase the Tylenol.      **NO MORE THAN 3000mg OF TYLENOL IN 24 HOURS**.     Robaxin/Methocarbamol 750mg (muscle relaxer)- you can take every 6-8 hours as needed for muscle spasms, thigh pain and stiffness, additional pain control or breakthrough pain medications. This medication is helpful for pain control while lessening your need for narcotics. Please reduce the use gradually as the pain and spasms lessen. DO NOT TAKE AT THE SAME TIME AS A PAIN PILL. YOU WILL BE BETTER SERVED WITH 2 HOURS BETWEEN PAIN PILL AND MUSCLE RELAXER.     Neurontin/Gabapentin 300mg (neuropathic/shocking/ nerve like pain) - ok to restart home regimen.  If you get to sleepy during the day, SWITCH TO BEDTIME DOSING or decrease dose and frequency while on pain pills and muscle relaxers.     **Other things that help with pain control is WALKING, COMPRESSION WRAP, ICE and ELEVATION!!**    BLOOD CLOT PREVENTION:   Aspirin 81mg (blood thinner) - twice a day for 6 weeks for blood clot prevention. Start on the evening of 2/2/24. Stop on 3/15/24.  If you were taking Baby Aspirin 81mg prior to surgery, may return to daily dose AFTER 6 weeks - 3/16/24.    You need to continuing wearing your compression stocking (SHON Hose - ThromboEmbolic Disease Prevention Device) for the next 2-6 weeks post-op. It is ok to remove them for hygiene and at bedtime.   Hand wash and Dry. **If the swelling persists in the legs after you stop wearing the Shon hose, continue to wear them until the swelling decreases.**  REMOVE STOCKINGS AT LEAST DAILY FOR SKIN ASSESSMENT.   Do NOT let the stockings roll down, creating a tourniquet around the back of your knee. If you need to, leave the excess at the bottom of the stocking.   The best thing you can do to prevent blood clots is to walk around as much as possible, AT LEAST EVERY 1-2 HOURS.       CONSTIPATION PREVENTION:   Miralax or Senokot S/[place]-Colace and Stool softeners EVERY DAY while on pain  meds.  Use other more aggressive over the counter LAXATIVES as needed for constipation (Examples: Milk of Magnesia, Dulcolax tabs or suppository, Magnesium Citrate, Fleet's Enema...etc.)   Drink lots of water.  Increase Fiber in diet.  Increase walking distance each day  DO NOT GO MORE THAN 2 DAYS WITHOUT HAVING A BOWEL MOVEMENT!    ACTIVITY:   Weight bearing precautions as follows:  FULL weight bearing to operative leg with walker.   HIP PRECAUTIONS:  NO Twisting  NO Leaning past 90 degrees  NO Crossing legs    DO NOT TAKE YOURSELF OFF OF THE WALKER TOO SOON. ALLOW YOUR OUTPATIENT THERAPIST or SURGEON TO GUIDE YOU.   Change positions often throughout the day.   Walk around at least every 1-2 hours while awake.   No heavy lifting, pulling, pushing or straining.  Ice the Hip and thigh AS MUCH AS POSSIBLE  Home Health Physical Therapy at least 3 times per week. After 2 weeks, transition to outpatient physical therapy.        WOUND CARE:   Dry dressing changes every other day and as needed for soiling for 14 days. Start SUNDAY. Switch to every other day as soon as possible. NOTIFY MD OF EXCESSIVE WOUND DRAINAGE.     DO NOT WET WOUND or apply any ointments, creams, lotions or antiseptics.  Ace wrap - apply your compression stocking and apply the ace wrap where the stocking stops for extra added compression to the knee.   May wet incision AFTER 2 weeks- (after you follow-up with your surgeon).   Ok to shower before then if able to keep wound from getting wet (plastic barrier, saran wrap or cling wrap and tape).   DO NOT TOUCH INCISION      URINARY RETENTION:  If you start having difficulty urinating, decrease the use of Pain pills and muscle relaxers and notify your primary care doctor.     PNEUMONIA PREVENTION:  Stay out of bed as much as possible and walk around every 1-2 hours.  Continue breathing exercises (Incentive Spirometry) every 1-2 hours while mobility is limited and while you are on pain pills.    FALL  PREVENTION:  Wear sturdy shoes that fit well - Wearing shoes with high heels or slippery soles, or shoes that are too loose, can lead to falls. Walking around in bare feet, or only socks, can also increase your risk of falling.  Use walker as long as your surgeon and therapist recommend it  Use good lighting and  throw rugs, electrical cords, furniture and clutter (anything than can cause you to trip at home.   Non-slip rug in bathroom or shower    INFECTION PREVENTION:  Duricef/Cefadroxil 500mg (Antibiotic) -  take twice a day for 7 days to prevent infection  Proper handwashing before and after dressing changes. Do not wet the wound. Wound care instructions as written above. NOTIFY MD OF EXCESSIVE WOUND DRAINAGE.  No alcohol, smoking or tobacco products  Pets should not be allowed around the wound or the dressing.   Treat UTI and skin infections as soon as possible.  Pre-medicate with antibiotics prior to dental or surgical procedures.   If you are diabetic, MAINTAIN GOOD BLOOD SUGAR CONTROL (Below 150) DURING YOUR RECOVERY. If you see high numbers, notify your primary care doctor.     Call your SURGEON'S OFFICE ([phone]) if you experience the following signs and symptoms of infection:   Unusual redness, swelling, excessive, cloudy or foul smelling drainage at the incision site.   Persistent low grade temp OR a temp greater than 102 F, unrelieved by Tylenol  Pain at surgical site, unrelieved by pain meds    Warning signs of a blood clot in your leg: (CALL YOUR SURGEON)  New onset or increasing pain in calf, new onset tenderness or redness above or below the knee or increasing swelling of your calf, ankle, or foot.  Warning signs that a blood clot has traveled to your lungs: (REPORT TO THE ER/CALL [phone])  Sudden or increase in Shortness of breath, sudden onset of chest pains, or  Localized chest pain with coughing.       IF ANY ISSUES ARISE AND YOU FEEL THE NEED TO CALL YOUR PRIMARY CARE DOCTOR, PLEASE LET  YOUR SURGEON KNOW AS WELL.     For emergencies, please report to OUR ([facility] or [facility]) Emergency department and tell them to call YOUR SURGEON at [phone].     BEFORE MAKING ANY CHANGES TO THE MEDICAL CARE PLAN OR GOING TO THE EMERGENCY ROOM, PLEASE CONTACT THE SURGEON.    3rd floor nursing unit # [phone]  Use this number for questions about your discharge instructions or problems filling your discharge prescriptions.

## 2024-02-01 NOTE — PROGRESS NOTES
No acute events overnight.  Pain controlled.  Resting in bed.     Vital Signs  Temp: 98 °F (36.7 °C)  Temp Source: Oral  Pulse: 71  Resp: 18  SpO2: 97 %  Pulse Oximetry Type: Continuous  Flow (L/min): 10  Device (Oxygen Therapy): room air  BP: (!) 107/58  Height and Weight  Height: 6' (182.9 cm)  Height Method: Stated  Weight: 102 kg (224 lb 13.9 oz)  Weight Method: Standard Scale  BSA (Calculated - sq m): 2.28 sq meters  BMI (Calculated): 30.5  Weight in (lb) to have BMI = 25: 183.9]    +FHL/EHL  BCR distally  Dressing c/d/i  SILT distally    Recent Lab Results         02/01/24  0520   01/31/24  1303   01/31/24  0905        Anion Gap 5.0           BUN 21.9           BUN/CREAT RATIO 16           Calcium 8.6           Chloride 107           CO2 25           Creatinine 1.38           eGFR 54           Glucose 119           Hematocrit 23.8   26.5         Hemoglobin 7.7   8.6         MCH 31.6           MCHC 32.4           MCV 97.5           MPV 10.2           nRBC 0.0           Platelet Count 235           POCT Glucose     85       Potassium 5.0           RBC 2.44           RDW 15.6           Sodium 137           WBC 8.87                   A/P:  Status post LARA  Pain controlled  Overall patient doing well.  Therapy for mobility and ambulation.  ASA for DVT PPx  ABLA - transfusion today

## 2024-02-01 NOTE — PT/OT/SLP PROGRESS
Physical Therapy Treatment    Patient Name:  Rene Baer   MRN:  [id number]    Recommendations:     Discharge Recommendations: Low Intensity Therapy  Discharge Equipment Recommendations: walker, rolling  Barriers to discharge: None    Assessment:     Rene Baer is a 73 y.o. male admitted with a medical diagnosis of Primary osteoarthritis of right hip.  He presents with the following impairments/functional limitations: weakness, impaired endurance, impaired functional mobility, decreased lower extremity function, pain, decreased ROM, edema, orthopedic precautions .    Rehab Prognosis: Good; patient would benefit from acute skilled PT services to address these deficits and reach maximum level of function.    Recent Surgery: Procedure(s) (LRB):  ROBOTIC ARTHROPLASTY, HIP, TOTAL, POSTERIOR APPROACH (Right) 1 Day Post-Op    Plan:     During this hospitalization, patient to be seen BID to address the identified rehab impairments via gait training, therapeutic activities, therapeutic exercises and progress toward the following goals:    Plan of Care Expires:  02/06/24    Subjective     Chief Complaint: tired  Patient/Family Comments/goals: go home  Pain/Comfort:         Objective:     Communicated with rn prior to session.  Patient found up in chair with   upon PT entry to room.     General Precautions: Standard, fall  Orthopedic Precautions: RLE weight bearing as tolerated  Braces:    Respiratory Status: Room air     Functional Mobility:  Transfers:     Sit to Stand:  contact guard assistance with rolling walker  Bed to Chair: contact guard assistance with  rolling walker  using  Step Transfer  Gait: pt amb 200ft w/rw and cga for safety. Pt amb w/decrease speed and step to gait pattern.   Stairs:  Pt ascended/descended 3 stair(s) with No Assistive Device with bilateral handrails with Contact Guard Assistance.   Pt ascended/descended a 4 inch curb with supervision/touching assist using RW.         Treatment &  Education:  Pt preformed seated ex 10x in chair.     Patient left up in chair with all lines intact and call button in reach..    GOALS:   Multidisciplinary Problems       Physical Therapy Goals          Problem: Physical Therapy    Goal Priority Disciplines Outcome Goal Variances Interventions   Physical Therapy Goal     PT, PT/OT Ongoing, Progressing     Description: Pt will improve functional independence by performing:    Bed mobility: SBA  Sit to stand: SBA with rolling walker  Bed to chair t/f: SBA with Stand Step  with rolling walker  Ambulation x 200'  with SBA with rolling walker-met  1 Step (Curb): Min A  with rolling walker-met  3 Steps: Min A  with B HR-met  Independent with total hip HEP                        Time Tracking:     PT Received On:    PT Start Time: 1000     PT Stop Time: 1023  PT Total Time (min): 23 min     Billable Minutes: Therapeutic Activity 23    Treatment Type: Treatment  PT/PTA: PTA     Number of PTA visits since last PT visit: 1 02/01/2024

## 2024-02-01 NOTE — PLAN OF CARE
Problem: Physical Therapy  Goal: Physical Therapy Goal  Description: Pt will improve functional independence by performing:    Bed mobility: SBA  Sit to stand: SBA with rolling walker  Bed to chair t/f: SBA with Stand Step  with rolling walker  Ambulation x 200'  with SBA with rolling walker-met  1 Step (Curb): Min A  with rolling walker-met  3 Steps: Min A  with B HR-met  Independent with total hip HEP   2/1/2024 1438 by Yennifer Haynes, YULISA  Outcome: Ongoing, Progressing

## 2024-02-01 NOTE — PLAN OF CARE
Problem: Occupational Therapy  Goal: Occupational Therapy Goal  Description: Pt will perform toileting SBA by d/c.  Pt will perform toilet t/f SBA by d/c.  Pt will perform shower t/f stand by assist by d/c.  Pt will perform car t/f stand by assist in adherence to pxns by d/c.   Outcome: Ongoing, Progressing

## 2024-02-01 NOTE — PLAN OF CARE
02/01/24 1420   Discharge Assessment   Assessment Type Discharge Planning Assessment   Source of Information patient;family   Communicated FRANCO with patient/caregiver Yes   Reason For Admission s/p THR   People in Home significant other   Do you expect to return to your current living situation? Yes   Do you have help at home or someone to help you manage your care at home? Yes   Who are your caregiver(s) and their phone number(s)? TAVO'- JAMMIE [phone]   Prior to hospitilization cognitive status: Alert/Oriented   Current cognitive status: Alert/Oriented   Walking or Climbing Stairs Difficulty yes   Walking or Climbing Stairs ambulation difficulty, requires equipment   Dressing/Bathing Difficulty yes   Dressing/Bathing bathing difficulty, requires equipment   Equipment Currently Used at Home none   Readmission within 30 days? No   Patient currently being followed by outpatient case management? No   Do you currently have service(s) that help you manage your care at home? No   Do you take prescription medications? Yes   Do you have prescription coverage? Yes   Do you have any problems affording any of your prescribed medications? No   Is the patient taking medications as prescribed? yes   Who is going to help you get home at discharge? S/O   How do you get to doctors appointments? car, drives self   Are you on dialysis? No   Do you take coumadin? No   Discharge Plan A Home with family;Home Health   Discharge Plan B Home with family;Home Health   DME Needed Upon Discharge  walker, rolling;bedside commode   Discharge Plan discussed with: Spouse/sig other;Patient   Transition of Care Barriers None     S/p THR. Spk w patient & significant other- Jammie via phone [phone] -- pt will dc to [facility] & she will asst w homecare & transport to/from outpt therapy.pt agreed to initial hh therapy; then transition to outpt therapy.  Pt needs RW and BSC. Provider list given. Foc obtained.   [facility]. Plan discharge tomorrow.   Called/faxed referral for outpt therapy to [facility] @ [facility]--- to start in 2 wks. They will contact pt with appointment date & time.  Called/faxed referral for dme to [facility].  They will deliver to hospital.   Pcp: Dr. Birmingham  Contact # Fwsxa [phone]      Patient DID participate in a pre-op exercise regimen

## 2024-02-01 NOTE — PT/OT/SLP EVAL
"Occupational Therapy   Evaluation    Name: Rene Baer  MRN: [id number]  Admitting Diagnosis: Primary osteoarthritis of right hip  Recent Surgery: Procedure(s) (LRB):  ROBOTIC ARTHROPLASTY, HIP, TOTAL, POSTERIOR APPROACH (Right) 1 Day Post-Op    Recommendations:     Discharge Recommendations: Low Intensity Therapy  Discharge Equipment Recommendations:  walker, rolling, 3-in-1 commode  Barriers to discharge:  None    Assessment:     Rene Baer is a 73 y.o. male with a medical diagnosis of Primary osteoarthritis of right hip. Performance deficits affecting function: weakness, impaired endurance, impaired self care skills, impaired functional mobility, decreased lower extremity function, pain, decreased ROM, orthopedic precautions.      Rehab Prognosis: Good; patient would benefit from acute skilled OT services to address these deficits and reach maximum level of function.       Plan:     Patient to be seen daily (BID) to address the above listed problems via self-care/home management, therapeutic activities, therapeutic exercises  Plan of Care Expires: 02/07/24  Plan of Care Reviewed with: patient    Subjective     Chief Complaint: Mild pain in R hip  Patient/Family Comments/goals: "I got this. Let's get it done"    Occupational Profile:  Living Environment: Patient lives alone however will be discharging to his girlfriends house. Gf has walk in shower   Previous level of function: Independent   Roles and Routines: Does not work   Equipment Used at Home: none  Assistance upon Discharge: Girlfriend     Pain/Comfort:  Location - Side 1: Right  Location 1: hip  Pain Addressed 1: Pre-medicate for activity    Patients cultural, spiritual, Buddhism conflicts given the current situation: no    Objective:     Communicated with: nursing prior to session. Patient found supine with peripheral IV upon OT entry to room.    General Precautions: Standard, fall  Orthopedic Precautions: RLE weight bearing as tolerated, RLE " posterior precautions  Braces: N/A  Respiratory Status: Room air    Occupational Performance:    Bed Mobility:    Patient completed Supine to Sit with stand by assistance    Functional Mobility/Transfers:  Patient completed Sit <> Stand Transfer with stand by assistance  with  standard walker   Patient completed Toilet Transfer Step Transfer technique with contact guard assistance with  rolling walker  Functional Mobility: Pt performed FM in hallway with SBA using RW, ~150 ft. No LOB noted    Activities of Daily Living:  Lower Body Dressing: stand by assistance Pt donned underwear using reacher, dressing sx leg first. SBA to pull over hips. Pt donned/doffed socks using reacher and sock aide with SBA. Pt educated on use of shoe horn     Cognitive/Visual Perceptual:  Cognitive/Psychosocial Skills:     -       Oriented to: Person, Place, Time, and Situation   -       Follows Commands/attention:Follows multistep  commands  -       Communication: clear/fluent  -       Memory: No Deficits noted  -       Safety awareness/insight to disability: intact   -       Mood/Affect/Coping skills/emotional control: Appropriate to situation  Visual/Perceptual:  -Intact      Physical Exam:  Upper Extremity Range of Motion:     -       Right Upper Extremity: WNL  -       Left Upper Extremity: WNL  Upper Extremity Strength:    -       Right Upper Extremity: WNL  -       Left Upper Extremity: WNL    Treatment & Education:  Pt educated on hip precautions  Pt educated on safe functional mobility with use of RW  Pt educated on use of AE to increase ADL independence while adhering to hip precautions     Patient left up in chair with all lines intact and call button in reach    GOALS:   Multidisciplinary Problems       Occupational Therapy Goals          Problem: Occupational Therapy    Goal Priority Disciplines Outcome Interventions   Occupational Therapy Goal     OT, PT/OT Ongoing, Progressing    Description: Pt will perform toileting SBA by  d/c.  Pt will perform toilet t/f SBA by d/c.  Pt will perform shower t/f stand by assist by d/c.  Pt will perform car t/f stand by assist in adherence to pxns by d/c.                      History:     Past Medical History:   Diagnosis Date    Adjustment disorder     Bilateral carpal tunnel syndrome     CAD (coronary artery disease)     Cervical radiculopathy     Chronic pain syndrome     CKD (chronic kidney disease)     Gout     HTN (hypertension)     Lumbar radiculopathy     Osteoarthritis     Pacemaker     Prostate cancer     Sleep apnea     Does not sleep with c-pap    Unspecified glaucoma        Past Surgical History:   Procedure Laterality Date    APPENDECTOMY      ARTHROCENTESIS OF HIP JOINT Right 03/29/2023    Procedure: R hip injection;  Surgeon: Tono Farah MD;  Location: [facility] OR;  Service: Orthopedics;  Laterality: Right;    Cataract surgery Bilateral     CERVICAL FUSION      COLONOSCOPY N/A 08/25/2022    Procedure: COLONOSCOPY;  Surgeon: Luis Felipe Mckeon III, MD;  Location: [facility];  Service: Endoscopy;  Laterality: N/A;    EPIDURAL STEROID INJECTION INTO CERVICAL SPINE      EPIDURAL STEROID INJECTION INTO LUMBAR SPINE      Fluoroscopy of spinal cord      INJECTION OF JOINT Right 07/27/2022    Procedure: Injection, Joint, Hip;  Surgeon: Tono Farah MD;  Location: [facility] OR;  Service: Orthopedics;  Laterality: Right;    INSERTION OF PERMANENT PACEMAKER      INTRAOPERATIVE RADIATION THERAPY      LUNG REMOVAL, PARTIAL      Myelogram      PENILE PROSTHESIS IMPLANT      ROBOTIC ARTHROPLASTY,HIP,TOTAL,POSTERIOR APPROACH Right 1/31/2024    Procedure: ROBOTIC ARTHROPLASTY, HIP, TOTAL, POSTERIOR APPROACH;  Surgeon: Tono Farah MD;  Location: [facility] OR;  Service: Orthopedics;  Laterality: Right;     Time Tracking:     OT Date of Treatment: 02/01/24  OT Start Time: 0925  OT Stop Time: 0955  OT Total Time (min): 30 min    Billable Minutes:Evaluation 30    2/1/2024

## 2024-02-01 NOTE — PT/OT/SLP PROGRESS
Physical Therapy Treatment    Patient Name:  Rene Baer   MRN:  [id number]    Recommendations:     Discharge Recommendations: Low Intensity Therapy  Discharge Equipment Recommendations: walker, rolling  Barriers to discharge:  medical    Assessment:     Rene Baer is a 73 y.o. male admitted with a medical diagnosis of Primary osteoarthritis of right hip.  He presents with the following impairments/functional limitations: weakness, impaired endurance, impaired functional mobility, decreased lower extremity function, pain, decreased ROM, edema, orthopedic precautions .    Rehab Prognosis: Good; patient would benefit from acute skilled PT services to address these deficits and reach maximum level of function.    Recent Surgery: Procedure(s) (LRB):  ROBOTIC ARTHROPLASTY, HIP, TOTAL, POSTERIOR APPROACH (Right) 1 Day Post-Op    Plan:     During this hospitalization, patient to be seen BID to address the identified rehab impairments via gait training, therapeutic activities, therapeutic exercises and progress toward the following goals:    Plan of Care Expires:  02/06/24    Subjective     Chief Complaint: tired  Patient/Family Comments/goals: n/a  Pain/Comfort:         Objective:     Communicated with rn prior to session.  Patient found supine with   upon PT entry to room.     General Precautions: Standard, fall  Orthopedic Precautions: RLE weight bearing as tolerated  Braces:    Respiratory Status: Room air     Functional Mobility:  Bed Mobility:     Supine to Sit: contact guard assistance  Transfers:     Sit to Stand:  contact guard assistance with rolling walker  Bed to Chair: contact guard assistance with  rolling walker  using  Step Transfer  Gait: pt amb 150ft w/rw , pt limited by pain and fatigue.       Patient left up in chair with all lines intact and call button in reach..    GOALS:   Multidisciplinary Problems       Physical Therapy Goals          Problem: Physical Therapy    Goal Priority Disciplines  Outcome Goal Variances Interventions   Physical Therapy Goal     PT, PT/OT Ongoing, Progressing     Description: Pt will improve functional independence by performing:    Bed mobility: SBA  Sit to stand: SBA with rolling walker  Bed to chair t/f: SBA with Stand Step  with rolling walker  Ambulation x 200'  with SBA with rolling walker-met  1 Step (Curb): Min A  with rolling walker-met  3 Steps: Min A  with B HR-met  Independent with total hip HEP                        Time Tracking:     PT Received On:    PT Start Time: 1424     PT Stop Time: 1434  PT Total Time (min): 10 min     Billable Minutes: Gait Training 10    Treatment Type: Treatment  PT/PTA: PTA     Number of PTA visits since last PT visit: 1 02/01/2024

## 2024-02-01 NOTE — PT/OT/SLP PROGRESS
"Occupational Therapy   Treatment    Name: Rene Baer  MRN: [id number]  Admitting Diagnosis:  Primary osteoarthritis of right hip  1 Day Post-Op    Recommendations:     Discharge Recommendations: Low Intensity Therapy  Discharge Equipment Recommendations:  walker, rolling, 3-in-1 commode  Barriers to discharge:  None    Assessment:     Rene Baer is a 73 y.o. male with a medical diagnosis of Primary osteoarthritis of right hip. Performance deficits affecting function are weakness, impaired endurance, impaired functional mobility, decreased lower extremity function, pain, decreased ROM.     Rehab Prognosis:  Good; patient would benefit from acute skilled OT services to address these deficits and reach maximum level of function.       Plan:     Patient to be seen daily (BID) to address the above listed problems via self-care/home management, therapeutic activities, therapeutic exercises  Plan of Care Expires: 02/07/24  Plan of Care Reviewed with: patient    Subjective     Chief Complaint: Severe pain in R hip   Patient/Family Comments/goals: "I can't take much more of this right now"  Pain/Comfort:  Pain Rating 1: 10/10  Location - Side 1: Right  Location 1: hip  Pain Addressed 1: Nurse notified    Objective:     Communicated with: nursing prior to session. Patient found up in chair with peripheral IV upon OT entry to room.    General Precautions: Standard, fall    Orthopedic Precautions:RLE weight bearing as tolerated  Braces: N/A  Respiratory Status: Room air     Occupational Performance:     Functional Mobility/Transfers:  Patient completed Sit <> Stand Transfer with stand by assistance  with  rolling walker   Patient completed Toilet Transfer Step Transfer technique with stand by assistance with  rolling walker  Patient completed car transfer with SBA using RW while adhering to hip pxns.   Functional Mobility: Pt performed functional mobility in hallway with SBA using Rw, ~175 ft. Limited by increase in " pain.     Treatment & Education:  Pt educated on frequent mobility to decrease pain     Patient left up in chair with all lines intact, call button in reach, and nurse and girlfriend present    GOALS:   Multidisciplinary Problems       Occupational Therapy Goals          Problem: Occupational Therapy    Goal Priority Disciplines Outcome Interventions   Occupational Therapy Goal     OT, PT/OT Ongoing, Progressing    Description: Pt will perform toileting SBA by d/c.  Pt will perform toilet t/f SBA by d/c. MET  Pt will perform shower t/f stand by assist by d/c.  Pt will perform car t/f stand by assist in adherence to pxns by d/c. MET                      Time Tracking:     OT Date of Treatment: 02/01/24  OT Start Time: 1256  OT Stop Time: 1313  OT Total Time (min): 17 min    Billable Minutes:Therapeutic Activity 17    OT/BELLA: OT        2/1/2024

## 2024-02-01 NOTE — PROGRESS NOTES
Pharmacist Renal Dose Adjustment Note    Rene Baer is a 73 y.o. male being treated with the medication metoclopramide.    Patient Data:    Vital Signs (Most Recent):  Temp: 97.9 °F (36.6 °C) (02/01/24 1250)  Pulse: 66 (02/01/24 1250)  Resp: 17 (02/01/24 1309)  BP: 105/61 (02/01/24 1250)  SpO2: 97 % (02/01/24 1250) Vital Signs (72h Range):  Temp:  [95.6 °F (35.3 °C)-98.8 °F (37.1 °C)]   Pulse:  [59-89]   Resp:  [15-24]   BP: ()/(48-90)   SpO2:  [95 %-100 %]      Recent Labs   Lab 02/01/24  0520   CREATININE 1.38*     Serum creatinine: 1.38 mg/dL (H) 02/01/24 0520  Estimated creatinine clearance: 58.1 mL/min (A)    Medication: Metoclopramide 10 mg every 6 hours will be changed to metoclopramide 5 mg every 6 hours    Pharmacist's Name: Carter Harden, PharmD

## 2024-02-02 VITALS
OXYGEN SATURATION: 96 % | DIASTOLIC BLOOD PRESSURE: 68 MMHG | SYSTOLIC BLOOD PRESSURE: 124 MMHG | BODY MASS INDEX: 30.46 KG/M2 | WEIGHT: 224.88 LBS | RESPIRATION RATE: 20 BRPM | TEMPERATURE: 98 F | HEIGHT: 72 IN | HEART RATE: 74 BPM

## 2024-02-02 LAB
ANION GAP SERPL CALC-SCNC: 8 MEQ/L
BUN SERPL-MCNC: 21.1 MG/DL (ref 8.4–25.7)
CALCIUM SERPL-MCNC: 8.8 MG/DL (ref 8.8–10)
CHLORIDE SERPL-SCNC: 105 MMOL/L (ref 98–107)
CO2 SERPL-SCNC: 25 MMOL/L (ref 23–31)
CREAT SERPL-MCNC: 1.22 MG/DL (ref 0.73–1.18)
CREAT/UREA NIT SERPL: 17
ERYTHROCYTE [DISTWIDTH] IN BLOOD BY AUTOMATED COUNT: 19.3 % (ref 11.5–17)
GFR SERPLBLD CREATININE-BSD FMLA CKD-EPI: >60 MLS/MIN/1.73/M2
GLUCOSE SERPL-MCNC: 137 MG/DL (ref 82–115)
HCT VFR BLD AUTO: 27.5 % (ref 42–52)
HGB BLD-MCNC: 9 G/DL (ref 14–18)
MCH RBC QN AUTO: 30.1 PG (ref 27–31)
MCHC RBC AUTO-ENTMCNC: 32.7 G/DL (ref 33–36)
MCV RBC AUTO: 92 FL (ref 80–94)
NRBC BLD AUTO-RTO: 0 %
PLATELET # BLD AUTO: 222 X10(3)/MCL (ref 130–400)
PMV BLD AUTO: 9.8 FL (ref 7.4–10.4)
POTASSIUM SERPL-SCNC: 4.3 MMOL/L (ref 3.5–5.1)
RBC # BLD AUTO: 2.99 X10(6)/MCL (ref 4.7–6.1)
SODIUM SERPL-SCNC: 138 MMOL/L (ref 136–145)
WBC # SPEC AUTO: 8.71 X10(3)/MCL (ref 4.5–11.5)

## 2024-02-02 PROCEDURE — 25000003 PHARM REV CODE 250: Performed by: ORTHOPAEDIC SURGERY

## 2024-02-02 PROCEDURE — 97116 GAIT TRAINING THERAPY: CPT | Mod: CQ

## 2024-02-02 PROCEDURE — 80048 BASIC METABOLIC PNL TOTAL CA: CPT | Performed by: ORTHOPAEDIC SURGERY

## 2024-02-02 PROCEDURE — 97530 THERAPEUTIC ACTIVITIES: CPT

## 2024-02-02 PROCEDURE — 85027 COMPLETE CBC AUTOMATED: CPT | Performed by: ORTHOPAEDIC SURGERY

## 2024-02-02 PROCEDURE — 25000003 PHARM REV CODE 250: Performed by: NURSE PRACTITIONER

## 2024-02-02 RX ADMIN — FAMOTIDINE 20 MG: 20 TABLET ORAL at 08:02

## 2024-02-02 RX ADMIN — LISINOPRIL 20 MG: 10 TABLET ORAL at 08:02

## 2024-02-02 RX ADMIN — METHOCARBAMOL 750 MG: 750 TABLET ORAL at 07:02

## 2024-02-02 RX ADMIN — TAMSULOSIN HYDROCHLORIDE 0.4 MG: 0.4 CAPSULE ORAL at 06:02

## 2024-02-02 RX ADMIN — DOCUSATE SODIUM 200 MG: 100 CAPSULE, LIQUID FILLED ORAL at 08:02

## 2024-02-02 RX ADMIN — ASPIRIN 81 MG CHEWABLE TABLET 81 MG: 81 TABLET CHEWABLE at 08:02

## 2024-02-02 RX ADMIN — HYDROCODONE BITARTRATE AND ACETAMINOPHEN 1 TABLET: 7.5; 325 TABLET ORAL at 05:02

## 2024-02-02 RX ADMIN — HYDROCODONE BITARTRATE AND ACETAMINOPHEN 1 TABLET: 7.5; 325 TABLET ORAL at 10:02

## 2024-02-02 NOTE — NURSING
Nurse Note: Discharge paperwork printed/given/explained along with prescriptions and therapy orders. Significant other present for discharge explanations and both patient and significant other verbalize understandings.       2/2/2024   10:09 AM      Perception of Care - Joint Replacement Population Total Joint Surgery List: HIP    Patient able to verbalize one way to treat/prevent SWELLING at home (other than pain medication)    [x] Yes   [] No   [] Further Education Provided        Attending Nurse:  Myah

## 2024-02-02 NOTE — NURSING
Nurses Note -- 4 Eyes      2/2/2024   9:40 AM      Skin assessed during: Admit      [x] No Altered Skin Integrity Present    []Prevention Measures Documented      [] Yes- Altered Skin Integrity Present or Discovered   [] LDA Added if Not in Epic (Describe Wound)   [] New Altered Skin Integrity was Present on Admit and Documented in LDA   [] Wound Image Taken    Wound Care Consulted? No    Attending Nurse:  Myah Irvin RN/Staff Member:   Reshma Nieto RN

## 2024-02-02 NOTE — PLAN OF CARE
Problem: Occupational Therapy  Goal: Occupational Therapy Goal  Description: Pt will perform toileting SBA by d/c.  Pt will perform toilet t/f SBA by d/c. MET  Pt will perform shower t/f stand by assist by d/c.  Pt will perform car t/f stand by assist in adherence to pxns by d/c. MET   Outcome: Met

## 2024-02-02 NOTE — PT/OT/SLP PROGRESS
Physical Therapy Treatment    Patient Name:  Rene Baer   MRN:  [id number]    Recommendations:     Discharge Recommendations: Low Intensity Therapy  Discharge Equipment Recommendations: walker, rolling  Barriers to discharge: None    Assessment:     Rene Baer is a 73 y.o. male admitted with a medical diagnosis of Primary osteoarthritis of right hip.  He presents with the following impairments/functional limitations: weakness, impaired endurance, impaired functional mobility, decreased lower extremity function, pain, decreased ROM, edema, orthopedic precautions .    Rehab Prognosis: Good; patient would benefit from acute skilled PT services to address these deficits and reach maximum level of function.    Recent Surgery: Procedure(s) (LRB):  ROBOTIC ARTHROPLASTY, HIP, TOTAL, POSTERIOR APPROACH (Right) 2 Days Post-Op    Plan:     During this hospitalization, patient to be seen BID to address the identified rehab impairments via gait training, therapeutic activities, therapeutic exercises and progress toward the following goals:    Plan of Care Expires:  02/06/24    Subjective     Chief Complaint: pain in L hip   Patient/Family Comments/goals: n/a  Pain/Comfort:  Pain Rating 1: 3/10  Location - Side 1: Right      Objective:     Communicated with rn prior to session.  Patient found sitting edge of bed with   upon PT entry to room.     General Precautions: Standard, fall  Orthopedic Precautions: RLE weight bearing as tolerated  Braces:    Respiratory Status: Room air     Functional Mobility:  Transfers:     Sit to Stand:  stand by assistance with rolling walker  Bed to Chair: stand by assistance with  rolling walker  using  Step Transfer  Gait: pt amb 200ft w/rw and stand by assist. Pt  required standing rest breaks due to fatigue and pain in L hip.       Treatment & Education:  Pt educated on hip px and Importance of frequent mobility.     Patient left sitting edge of bed with all lines intact and call button  in reach..    GOALS:   Multidisciplinary Problems       Physical Therapy Goals          Problem: Physical Therapy    Goal Priority Disciplines Outcome Goal Variances Interventions   Physical Therapy Goal     PT, PT/OT Ongoing, Progressing     Description: Pt will improve functional independence by performing:    Bed mobility: SBA  Sit to stand: SBA with rolling walker-met  Bed to chair t/f: SBA with Stand Step  with rolling walker-met  Ambulation x 200'  with SBA with rolling walker-met  1 Step (Curb): Min A  with rolling walker-met  3 Steps: Min A  with B HR-met  Independent with total hip HEP                        Time Tracking:     PT Received On:    PT Start Time: 0845     PT Stop Time: 0900  PT Total Time (min): 15 min     Billable Minutes: Gait Training 15    Treatment Type: Treatment  PT/PTA: PTA     Number of PTA visits since last PT visit: 2     02/02/2024

## 2024-02-02 NOTE — PLAN OF CARE
Problem: Physical Therapy  Goal: Physical Therapy Goal  Description: Pt will improve functional independence by performing:    Bed mobility: SBA  Sit to stand: SBA with rolling walker-met  Bed to chair t/f: SBA with Stand Step  with rolling walker-met  Ambulation x 200'  with SBA with rolling walker-met  1 Step (Curb): Min A  with rolling walker-met  3 Steps: Min A  with B HR-met  Independent with total hip HEP   Outcome: Ongoing, Progressing

## 2024-02-02 NOTE — PROGRESS NOTES
No acute events overnight.  Pain controlled.  Resting in bed. S/p transfusion for ABLA - feeling well this AM.    Vital Signs  Temp: 98 °F (36.7 °C)  Temp Source: Oral  Pulse: 73  Resp: 17  SpO2: 96 %  Pulse Oximetry Type: Intermittent  Flow (L/min): 10  Device (Oxygen Therapy): room air  BP: 124/65  Height and Weight  Height: 6' (182.9 cm)  Height Method: Stated  Weight: 102 kg (224 lb 13.9 oz)  Weight Method: Standard Scale  BSA (Calculated - sq m): 2.28 sq meters  BMI (Calculated): 30.5  Weight in (lb) to have BMI = 25: 183.9]    +FHL/EHL  BCR distally  Dressing c/d/i  SILT distally    Recent Lab Results         02/02/24  0524        Anion Gap 8.0       BUN 21.1       BUN/CREAT RATIO 17       Calcium 8.8       Chloride 105       CO2 25       Creatinine 1.22       eGFR >60       Glucose 137       Hematocrit 27.5       Hemoglobin 9.0       MCH 30.1       MCHC 32.7       MCV 92.0       MPV 9.8       nRBC 0.0       Platelet Count 222       Potassium 4.3       RBC 2.99       RDW 19.3       Sodium 138       WBC 8.71               A/P:  Status post LARA with ABLA - resolved  Pain controlled  Overall patient doing well.  Therapy for mobility and ambulation.  ASA for DVT PPx  Home today

## 2024-02-02 NOTE — PT/OT/SLP PROGRESS
Occupational Therapy   Treatment    Name: Rene Baer  MRN: [id number]  Admitting Diagnosis:  Primary osteoarthritis of right hip  2 Days Post-Op    Recommendations:     Discharge Recommendations: Low Intensity Therapy  Discharge Equipment Recommendations:  walker, rolling, 3-in-1 commode  Barriers to discharge:  None    Assessment:     Rene Baer is a 73 y.o. male with a medical diagnosis of Primary osteoarthritis of right hip.  Performance deficits affecting function are impaired functional mobility, decreased lower extremity function, pain, decreased ROM, orthopedic precautions.     Rehab Prognosis:  Good; patient would benefit from acute skilled OT services to address these deficits and reach maximum level of function.       Plan:     Patient to be seen daily (BID) to address the above listed problems via self-care/home management, therapeutic activities, therapeutic exercises  Plan of Care Expires: 02/07/24  Plan of Care Reviewed with: patient    Subjective     Chief Complaint: Mild pain in R hip   Patient/Family Comments/goals: to go home  Pain/Comfort:  Location - Side 1: Right  Location 1: hip  Pain Addressed 1: Pre-medicate for activity    Objective:     Communicated with: nursing prior to session.  Patient found up in chair with peripheral IV upon OT entry to room.    General Precautions: Standard, fall    Orthopedic Precautions:RLE weight bearing as tolerated, RLE posterior precautions  Braces: N/A  Respiratory Status: Room air     Occupational Performance:     Functional Mobility/Transfers:  Patient completed Sit <> Stand Transfer with stand by assistance  with  rolling walker   Patient completed  Shower Transfer Step Transfer technique with stand by assistance with rolling walker. Pt educated on back up technique, going in first with non sx leg, coming out with sx leg. Pt demo'd understanding of safe transfer.   Functional Mobility: Pt performed FM in hallway with SBA using RW, ~275 ft.      Treatment & Education:  Pt educated on shower safety to decrease risk of falls     Patient left up in chair with all lines intact and call button in reach    GOALS: Pt has met all acute OT goals. Patient is appropriate for D/C from OT services. Please use as D/C note.   Multidisciplinary Problems       Occupational Therapy Goals       Not on file              Multidisciplinary Problems (Resolved)          Problem: Occupational Therapy    Goal Priority Disciplines Outcome Interventions   Occupational Therapy Goal   (Resolved)     OT, PT/OT Met    Description: Pt will perform toileting SBA by d/c.  Pt will perform toilet t/f SBA by d/c. MET  Pt will perform shower t/f stand by assist by d/c.  Pt will perform car t/f stand by assist in adherence to pxns by d/c. MET                      Time Tracking:     OT Date of Treatment: 02/02/24  OT Start Time: 0934  OT Stop Time: 0950  OT Total Time (min): 16 min    Billable Minutes:Therapeutic Activity 16    OT/BELLA: OT          2/2/2024

## 2024-02-02 NOTE — PLAN OF CARE
Problem: Adult Inpatient Plan of Care  Goal: Plan of Care Review  Outcome: Ongoing, Progressing  Flowsheets (Taken 2/1/2024 1942)  Plan of Care Reviewed With: patient  Goal: Optimal Comfort and Wellbeing  Outcome: Ongoing, Progressing  Intervention: Monitor Pain and Promote Comfort  Flowsheets (Taken 2/1/2024 1942)  Pain Management Interventions:   around-the-clock dosing utilized   breathing exercises utilized   medication offered   pain management plan reviewed with patient/caregiver   position adjusted   quiet environment facilitated   relaxation techniques promoted     Problem: Fall Injury Risk  Goal: Absence of Fall and Fall-Related Injury  Outcome: Ongoing, Progressing  Intervention: Identify and Manage Contributors  Flowsheets (Taken 2/1/2024 1942)  Self-Care Promotion:   independence encouraged   BADL personal objects within reach  Medication Review/Management: medications reviewed     Problem: Bleeding (Hip Arthroplasty)  Goal: Absence of Bleeding  Outcome: Ongoing, Progressing  Intervention: Monitor and Manage Bleeding  Flowsheets (Taken 2/1/2024 1942)  Bleeding Management: dressing monitored     Problem: Infection (Hip Arthroplasty)  Goal: Absence of Infection Signs and Symptoms  Outcome: Ongoing, Progressing  Intervention: Prevent or Manage Infection  Flowsheets (Taken 2/1/2024 1942)  Fever Reduction/Comfort Measures: lightweight bedding  Infection Management: aseptic technique maintained     Problem: Pain (Hip Arthroplasty)  Goal: Acceptable Pain Control  Outcome: Ongoing, Progressing  Intervention: Prevent or Manage Pain  Flowsheets (Taken 2/1/2024 1942)  Pain Management Interventions:   around-the-clock dosing utilized   breathing exercises utilized   medication offered   pain management plan reviewed with patient/caregiver   position adjusted   quiet environment facilitated   relaxation techniques promoted

## 2024-02-03 LAB — VIEW PATHOLOGY REPORT (RELIAPATH): NORMAL

## 2024-02-03 PROCEDURE — G0180 MD CERTIFICATION HHA PATIENT: HCPCS | Mod: ,,, | Performed by: ORTHOPAEDIC SURGERY

## 2024-02-05 ENCOUNTER — PATIENT OUTREACH (OUTPATIENT)
Dept: ADMINISTRATIVE | Facility: CLINIC | Age: 74
End: 2024-02-05
Payer: MEDICARE

## 2024-02-05 NOTE — DISCHARGE SUMMARY
[facility]  Discharge Summary      Admit Date: 1/31/2024    Discharge Date and Time: 2/2/2024 10:30 AM    Attending Physician: Jenny att. providers found     Reason for Admission: primary OA right hip    Procedures Performed: Procedure(s) (LRB):  ROBOTIC ARTHROPLASTY, HIP, TOTAL, POSTERIOR APPROACH (Right)    Hospital Course Patient seen in clinic with complaints of right hip pain. Tried and failed all conservative measures including activity modification, anti-inflammatories, and injections. Patient felt as though they have reached a point of disability with regards to right hip pain. Risk, benefits, and alternatives discussed for right total hip arthroplasty. Despite these risks, the patient wished to proceed with surgical intervention.    Patient admitted as an inpatient. Seen preoperatively by anesthesia and cleared for surgery. Patient was then taken to the OR and a right total hip arthroplasty was performed. For full details please see dictated operative note. Patient tolerated the procedure without any issues and was transferred to the floor postoperatively. Physical therapy began working with the patient on postop day 1 and patient was made weightbearing as tolerated to affected extremity. Patient progressed well with physical therapy and eventually cleared all physical therapy guidelines. Patient's pain and vitals remained stable throughout hospitalization. Wound was checked and found to be clean, dry, and intact. At this point the patient was deemed suitable to discharge home.      Goals of Care Treatment Preferences:  Code Status: Full Code      Consults: PT    Significant Diagnostic Studies:   Specimen (24h ago, onward)      None            Final Diagnoses:    Principal Problem: Primary osteoarthritis of right hip   Secondary Diagnoses:   Active Hospital Problems    Diagnosis  POA    *Primary osteoarthritis of right hip [M16.11]  Yes      Resolved Hospital Problems   No  resolved problems to display.       Discharged Condition: good    Disposition: Home or Self Care    Follow Up/Patient Instructions:     Medications:  Reconciled Home Medications:      Medication List        START taking these medications      aspirin 81 MG Chew  Take 1 tablet (81 mg total) by mouth 2 (two) times a day. Blood clot prevention     cefadroxil 500 MG Cap  Commonly known as: DURICEF  Take 1 capsule (500 mg total) by mouth every 12 (twelve) hours. for 7 days     methocarbamoL 750 MG Tab  Commonly known as: ROBAXIN  Take 1 tablet (750 mg total) by mouth every 6 (six) hours as needed (muscle spasms).     polyethylene glycol 17 gram Pwpk  Commonly known as: GLYCOLAX  Take 17 g by mouth once daily. for 14 days            CHANGE how you take these medications      HYDROcodone-acetaminophen  mg per tablet  Commonly known as: NORCO  Take 1 tablet by mouth every 4 (four) hours as needed for Pain.  What changed: when to take this            CONTINUE taking these medications      atorvastatin 40 MG tablet  Commonly known as: LIPITOR  Take 40 mg by mouth every evening.     bicalutamide 50 MG Tab  Commonly known as: CASODEX  Take 50 mg by mouth every evening.     cetirizine 10 MG tablet  Commonly known as: ZYRTEC  Take 10 mg by mouth once daily.     colchicine 0.6 mg tablet  Commonly known as: COLCRYS  Take 1 tablet (0.6 mg total) by mouth 2 (two) times daily as needed (Take as needed twice a day for gout pain.).     ferrous gluconate 324 MG tablet  Commonly known as: FERGON  Take 1 tablet by mouth daily with breakfast.     gabapentin 300 MG capsule  Commonly known as: NEURONTIN  TAKE TWO CAPSULES BY MOUTH THREE TIMES A DAY     indomethacin 50 MG capsule  Commonly known as: INDOCIN  Take 50 mg by mouth once daily.  Notes to patient: HOLD FOR 2 WEEKS     isosorbide mononitrate 30 MG 24 hr tablet  Commonly known as: IMDUR  Take 30 mg by mouth every evening.     lactulose 10 gram/15 mL solution  Commonly known as:  CHRONULAC  Take 30 g by mouth 2 (two) times daily as needed.     lisinopriL 20 MG tablet  Commonly known as: PRINIVIL,ZESTRIL  Take 20 mg by mouth once daily.     nitroGLYCERIN 0.4 MG SL tablet  Commonly known as: NITROSTAT  Place 0.4 mg under the tongue every 5 (five) minutes as needed.     pantoprazole 40 MG tablet  Commonly known as: PROTONIX  Take 40 mg by mouth once daily.     potassium citrate 15 mEq Tbsr  Commonly known as: UROCIT-K 15  Take 1 tablet by mouth 2 (two) times daily.     sertraline 50 MG tablet  Commonly known as: ZOLOFT  TAKE ONE TABLET BY MOUTH AT BEDTIME     tamsulosin 0.4 mg Cap  Commonly known as: FLOMAX  Take 0.4 mg by mouth every morning.     topiramate 25 MG tablet  Commonly known as: TOPAMAX  Take 25 mg by mouth as needed.            STOP taking these medications      tiZANidine 4 MG tablet  Commonly known as: ZANAFLEX            Discharge Procedure Orders   Ambulatory referral/consult to Home Health   Standing Status: Future   Referral Priority: Routine Referral Type: Home Health   Referral Reason: Specialty Services Required   Requested Specialty: Home Health Services   Number of Visits Requested: 1      Follow-up Information       Tono Farah MD. Go on 2/15/2024.    Specialty: Orthopedic Surgery  Why: Ortho follow up appt on Thursday 2/15/24 @ 2:45pm w/ Ashanti (Dr Farah's NP)  Contact information:  [address]  [address]  [facility] [address]  [phone]               [facility] Follow up.    Specialties: Home Health Services, Home Therapy Services, Home Living Aide Services  Why: This is home health agency. Home health will provide therapy & dressing changes for 2 weeks. Call if you have questions or concerns.  Contact information:  [address] [place] [address]  [phone]             [facility].    Specialty: Physical Therapy  Why: This is the outpatient therapy facility---to start after home  health therapy. They will contact pt with appt date & time. Call if you have questions or concerns.  Contact information:  [address]  [address]  [facility]  [place] [address]  [phone]               [facility] Follow up.    Why: This is the equipment company. Call if you have questions or concerns.  Contact information:  [address] [place] [address]  [place] [address]  [phone]

## 2024-02-05 NOTE — PROGRESS NOTES
I have seen the patient, reviewed the Nurse Practitioner's discharge summary. I have personally interviewed and examined the patient at bedside and: agree with the findings.     Tono Farah MD  Orthopedic Surgery

## 2024-02-05 NOTE — PROGRESS NOTES
C3 nurse attempted to contact Rene Gonzalezes for a TCC post hospital discharge follow up call.  The patient answered but was working with therapy and requested a call back this afternoon. The patient does not have a scheduled HOSFU appointment with Florin Birmingham MD and the pt does not have an [facility] PCP.  The patient does have a POST OP appt with Tono Farah MD (Orthopedic Surgery) on 2/15/2024 @ 2:45.

## 2024-02-05 NOTE — PROGRESS NOTES
2nd attempt-C3 nurse attempted to contact Rene Baer for a TCC post hospital discharge follow up call.  No answer and no voicemail available. The patient does not have a scheduled HOSFU appointment with Florin Birmingham MD and the pt does not have an [facility] PCP.  The patient does have a POST OP appt with Tono Farah MD (Orthopedic Surgery) on 2/15/2024 @ 2:45.

## 2024-02-06 NOTE — PROGRESS NOTES
C3 nurse spoke with Rene Baer for a TCC post hospital discharge follow up call.  The patient does not have a scheduled HOSFU appointment with Florin Birmingham MD. Unable to route message to PCP.  Advised patient to call and schedule appt within 5-7 days of discharge.  The patient does have a POST OP appt with Tono Farah MD (Orthopedic Surgery) on 2/15/2024 @ 2:45.

## 2024-02-15 ENCOUNTER — OFFICE VISIT (OUTPATIENT)
Dept: ORTHOPEDICS | Facility: CLINIC | Age: 74
End: 2024-02-15
Payer: MEDICARE

## 2024-02-15 ENCOUNTER — HOSPITAL ENCOUNTER (OUTPATIENT)
Dept: RADIOLOGY | Facility: CLINIC | Age: 74
Discharge: HOME | End: 2024-02-15
Attending: NURSE PRACTITIONER
Payer: MEDICARE

## 2024-02-15 VITALS
HEIGHT: 72 IN | DIASTOLIC BLOOD PRESSURE: 76 MMHG | WEIGHT: 224 LBS | BODY MASS INDEX: 30.34 KG/M2 | SYSTOLIC BLOOD PRESSURE: 122 MMHG | HEART RATE: 69 BPM

## 2024-02-15 DIAGNOSIS — Z96.641 STATUS POST RIGHT HIP REPLACEMENT: ICD-10-CM

## 2024-02-15 DIAGNOSIS — Z96.641 STATUS POST RIGHT HIP REPLACEMENT: Primary | ICD-10-CM

## 2024-02-15 PROCEDURE — 99024 POSTOP FOLLOW-UP VISIT: CPT | Mod: ,,, | Performed by: NURSE PRACTITIONER

## 2024-02-15 PROCEDURE — 3288F FALL RISK ASSESSMENT DOCD: CPT | Mod: CPTII,,, | Performed by: NURSE PRACTITIONER

## 2024-02-15 PROCEDURE — 1101F PT FALLS ASSESS-DOCD LE1/YR: CPT | Mod: CPTII,,, | Performed by: NURSE PRACTITIONER

## 2024-02-15 PROCEDURE — 3044F HG A1C LEVEL LT 7.0%: CPT | Mod: CPTII,,, | Performed by: NURSE PRACTITIONER

## 2024-02-15 PROCEDURE — 1159F MED LIST DOCD IN RCRD: CPT | Mod: CPTII,,, | Performed by: NURSE PRACTITIONER

## 2024-02-15 PROCEDURE — 3074F SYST BP LT 130 MM HG: CPT | Mod: CPTII,,, | Performed by: NURSE PRACTITIONER

## 2024-02-15 PROCEDURE — 73502 X-RAY EXAM HIP UNI 2-3 VIEWS: CPT | Mod: RT,,, | Performed by: NURSE PRACTITIONER

## 2024-02-15 PROCEDURE — 3078F DIAST BP <80 MM HG: CPT | Mod: CPTII,,, | Performed by: NURSE PRACTITIONER

## 2024-02-15 RX ORDER — HYDROCODONE BITARTRATE AND ACETAMINOPHEN 10; 325 MG/1; MG/1
1 TABLET ORAL EVERY 6 HOURS PRN
Qty: 28 TABLET | Refills: 0 | Status: SHIPPED | OUTPATIENT
Start: 2024-02-15

## 2024-02-16 ENCOUNTER — EXTERNAL HOME HEALTH (OUTPATIENT)
Dept: HOME HEALTH SERVICES | Facility: HOSPITAL | Age: 74
End: 2024-02-16
Payer: MEDICARE

## 2024-03-14 ENCOUNTER — DOCUMENT SCAN (OUTPATIENT)
Dept: HOME HEALTH SERVICES | Facility: HOSPITAL | Age: 74
End: 2024-03-14
Payer: MEDICARE

## 2024-03-14 NOTE — PROGRESS NOTES
Chief Complaint:   Chief Complaint   Patient presents with    Right Hip - Post-op Evaluation     Post op R LARA 1/31/24 - 4/30/24 - pt states that he has irritation where his staples are. He is ambulating with a walker today.        History of present illness: Rene Baer is a 73 y.o. male, presents to clinic today in regards to his right hip.  Patient is about 2 weeks out from surgery.  Overall he is doing well.  Ambulating with the assistance of a walker.  Currently in physical therapy for strengthening, range of motion, mobility.  Doing well with this.  Regards incision site denies any drainage or bleeding.  Dressing is clean dry and intact.      Past Medical History:   Diagnosis Date    Adjustment disorder     Bilateral carpal tunnel syndrome     CAD (coronary artery disease)     Cervical radiculopathy     Chronic pain syndrome     CKD (chronic kidney disease)     Gout     HTN (hypertension)     Lumbar radiculopathy     Osteoarthritis     Pacemaker     Prostate cancer     Sleep apnea     Does not sleep with c-pap    Unspecified glaucoma        Past Surgical History:   Procedure Laterality Date    APPENDECTOMY      ARTHROCENTESIS OF HIP JOINT Right 03/29/2023    Procedure: R hip injection;  Surgeon: Tono Farah MD;  Location: [facility];  Service: Orthopedics;  Laterality: Right;    Cataract surgery Bilateral     CERVICAL FUSION      COLONOSCOPY N/A 08/25/2022    Procedure: COLONOSCOPY;  Surgeon: Luis Felipe Mckeon III, MD;  Location: [facility];  Service: Endoscopy;  Laterality: N/A;    EPIDURAL STEROID INJECTION INTO CERVICAL SPINE      EPIDURAL STEROID INJECTION INTO LUMBAR SPINE      Fluoroscopy of spinal cord      INJECTION OF JOINT Right 07/27/2022    Procedure: Injection, Joint, Hip;  Surgeon: Tono Farah MD;  Location: [facility] OR;  Service: Orthopedics;  Laterality: Right;    INSERTION OF PERMANENT PACEMAKER      INTRAOPERATIVE RADIATION THERAPY      LUNG REMOVAL, PARTIAL      Myelogram      PENILE  PROSTHESIS IMPLANT      ROBOTIC ARTHROPLASTY,HIP,TOTAL,POSTERIOR APPROACH Right 1/31/2024    Procedure: ROBOTIC ARTHROPLASTY, HIP, TOTAL, POSTERIOR APPROACH;  Surgeon: Tono Farah MD;  Location: [facility];  Service: Orthopedics;  Laterality: Right;       Current Outpatient Medications   Medication Sig    aspirin 81 MG Chew Take 1 tablet (81 mg total) by mouth 2 (two) times a day. Blood clot prevention    atorvastatin (LIPITOR) 40 MG tablet Take 40 mg by mouth every evening.    bicalutamide (CASODEX) 50 MG Tab Take 50 mg by mouth every evening.    cetirizine (ZYRTEC) 10 MG tablet Take 10 mg by mouth once daily.    colchicine (COLCRYS) 0.6 mg tablet Take 1 tablet (0.6 mg total) by mouth 2 (two) times daily as needed (Take as needed twice a day for gout pain.). (Patient taking differently: Take 0.6 mg by mouth 2 (two) times daily.)    ferrous gluconate (FERGON) 324 MG tablet Take 1 tablet by mouth daily with breakfast.    gabapentin (NEURONTIN) 300 MG capsule TAKE TWO CAPSULES BY MOUTH THREE TIMES A DAY    HYDROcodone-acetaminophen (NORCO)  mg per tablet Take 1 tablet by mouth every 6 (six) hours as needed for Pain.    indomethacin (INDOCIN) 50 MG capsule Take 50 mg by mouth once daily.    isosorbide mononitrate (IMDUR) 30 MG 24 hr tablet Take 30 mg by mouth every evening.    lactulose (CHRONULAC) 10 gram/15 mL solution Take 30 g by mouth 2 (two) times daily as needed.    lisinopril (PRINIVIL,ZESTRIL) 20 MG tablet Take 20 mg by mouth once daily.    nitroGLYCERIN (NITROSTAT) 0.4 MG SL tablet Place 0.4 mg under the tongue every 5 (five) minutes as needed.    pantoprazole (PROTONIX) 40 MG tablet Take 40 mg by mouth once daily.    potassium citrate (UROCIT-K 15) 15 mEq TbSR Take 1 tablet by mouth 2 (two) times daily.    sertraline (ZOLOFT) 50 MG tablet TAKE ONE TABLET BY MOUTH AT BEDTIME (Patient taking differently: Take 50 mg by mouth every evening.)    tamsulosin (FLOMAX) 0.4 mg Cap Take 0.4 mg by mouth every  morning.    topiramate (TOPAMAX) 25 MG tablet Take 25 mg by mouth as needed.     No current facility-administered medications for this visit.     Facility-Administered Medications Ordered in Other Visits   Medication    lactated ringers infusion    lactated ringers infusion    LIDOcaine (PF) 10 mg/ml (1%) injection 10 mg       Review of patient's allergies indicates:   Allergen Reactions    Proparacaine     Tropicamide      Other reaction(s): disoriented       Family History   Problem Relation Age of Onset    Heart disease Mother     Hypertension Mother     Heart disease Sister     Hypertension Sister     Heart disease Brother     Hypertension Brother        Social History     Socioeconomic History    Marital status:    Tobacco Use    Smoking status: Never    Smokeless tobacco: Never   Substance and Sexual Activity    Alcohol use: Yes     Alcohol/week: 1.0 standard drink of alcohol     Types: 1 Shots of liquor per week     Comment: RARE    Drug use: Never    Sexual activity: Yes     Partners: Female           Review of Systems:    Denies fevers, chills, chest pain, shortness of breath. Comprehensive review of systems performed and otherwise negative except as noted in HPI      Physical Examination:    General: awake and alert, no acute distress, healthy appearing  Head and Neck: Head atraumatic/normocephalic. Moist MM  CV: brisk cap refill  Lungs: non-labored breathing, w/o cough or SOB  Skin: no rashes present, warm to touch  Neuro: sensation grossly intact distall     Vital Signs:    Vitals:    02/15/24 1458   BP: 122/76   Pulse: 69       Body mass index is 30.38 kg/m².    Right hip exam:    Right hip incision clean dry and intact. No erythema, drainage, or signs of infection  No swelling distally. No signs of DVT  Brisk cap refill right foot  Sensation intact distally to right foot  Positive FHL/EHL/gastrocsoleus/tib ant    X-rays: 3 views of the right hip reviewed. Patient's implants appear well fixed. No  signs of loosening or subsidence noted.     Assessment::post op status post R LARA    Plan:  Patient presents to clinic today 2 weeks after surgery.  His hip is stable on exam x-rays today here in clinic.  Overall he is doing pretty well.  He was encouraged to continue physical therapy for strengthening, range of motion, mobility.  Incision site is well healed and staples were discontinued.  Patient was educated can not shower without fully submerging in water.  He does state understanding.  I will have him follow up in 4 weeks with x-rays to reassess his hip.  Patient states understanding and agrees with plan of care.    This note was created using [employer] voice recognition software that occasionally misinterpreted phrases or words.    Consult note is delivered via Epic messaging service.

## 2024-03-21 ENCOUNTER — HOSPITAL ENCOUNTER (OUTPATIENT)
Dept: RADIOLOGY | Facility: CLINIC | Age: 74
Discharge: HOME | End: 2024-03-21
Attending: ORTHOPAEDIC SURGERY
Payer: MEDICARE

## 2024-03-21 ENCOUNTER — OFFICE VISIT (OUTPATIENT)
Dept: ORTHOPEDICS | Facility: CLINIC | Age: 74
End: 2024-03-21
Payer: MEDICARE

## 2024-03-21 VITALS
WEIGHT: 234 LBS | HEIGHT: 72 IN | BODY MASS INDEX: 31.69 KG/M2 | HEART RATE: 81 BPM | SYSTOLIC BLOOD PRESSURE: 132 MMHG | DIASTOLIC BLOOD PRESSURE: 80 MMHG

## 2024-03-21 DIAGNOSIS — Z96.641 STATUS POST RIGHT HIP REPLACEMENT: ICD-10-CM

## 2024-03-21 DIAGNOSIS — Z96.641 STATUS POST RIGHT HIP REPLACEMENT: Primary | ICD-10-CM

## 2024-03-21 PROCEDURE — 3075F SYST BP GE 130 - 139MM HG: CPT | Mod: CPTII,,, | Performed by: NURSE PRACTITIONER

## 2024-03-21 PROCEDURE — 73502 X-RAY EXAM HIP UNI 2-3 VIEWS: CPT | Mod: RT,,, | Performed by: ORTHOPAEDIC SURGERY

## 2024-03-21 PROCEDURE — 99024 POSTOP FOLLOW-UP VISIT: CPT | Mod: ,,, | Performed by: NURSE PRACTITIONER

## 2024-03-21 PROCEDURE — 3079F DIAST BP 80-89 MM HG: CPT | Mod: CPTII,,, | Performed by: NURSE PRACTITIONER

## 2024-03-21 PROCEDURE — 1125F AMNT PAIN NOTED PAIN PRSNT: CPT | Mod: CPTII,,, | Performed by: NURSE PRACTITIONER

## 2024-03-21 PROCEDURE — 1159F MED LIST DOCD IN RCRD: CPT | Mod: CPTII,,, | Performed by: NURSE PRACTITIONER

## 2024-03-21 PROCEDURE — 3044F HG A1C LEVEL LT 7.0%: CPT | Mod: CPTII,,, | Performed by: NURSE PRACTITIONER

## 2024-03-21 PROCEDURE — 3288F FALL RISK ASSESSMENT DOCD: CPT | Mod: CPTII,,, | Performed by: NURSE PRACTITIONER

## 2024-03-21 PROCEDURE — 1101F PT FALLS ASSESS-DOCD LE1/YR: CPT | Mod: CPTII,,, | Performed by: NURSE PRACTITIONER

## 2024-03-21 RX ORDER — METHOCARBAMOL 750 MG/1
750 TABLET, FILM COATED ORAL 3 TIMES DAILY
Qty: 42 TABLET | Refills: 0 | Status: SHIPPED | OUTPATIENT
Start: 2024-03-21 | End: 2024-04-04

## 2024-03-21 RX ORDER — METHOCARBAMOL 750 MG/1
500 TABLET, FILM COATED ORAL 4 TIMES DAILY
COMMUNITY

## 2024-03-21 NOTE — PROGRESS NOTES
Chief Complaint:   Chief Complaint   Patient presents with    Right Hip - Follow-up     Pt states he is doing great after his surgery. Pt is attending PT 3x a week. Pt reports a mild soreness/tightness which is bearable and intermittent. Pt is requesting a refill for his Robaxin 750 mg.       History of present illness: Rene Baer is a 73 y.o. male, presents to the clinic today in regards to his right hip.  Patient is about 6 weeks out from surgery.  Overall he is doing well.  Ambulating with the assistance of a cane.  Continues physical therapy for strengthening, range of motion, mobility.  Doing well with this.  Patient is a pain management patient and we had like a refill Robaxin he does state that he is continuing to have muscle spasms at night.  Also asking for refill of pain medicine.      Past Medical History:   Diagnosis Date    Adjustment disorder     Bilateral carpal tunnel syndrome     CAD (coronary artery disease)     Cervical radiculopathy     Chronic pain syndrome     CKD (chronic kidney disease)     Gout     HTN (hypertension)     Lumbar radiculopathy     Osteoarthritis     Pacemaker     Prostate cancer     Sleep apnea     Does not sleep with c-pap    Unspecified glaucoma        Past Surgical History:   Procedure Laterality Date    APPENDECTOMY      ARTHROCENTESIS OF HIP JOINT Right 03/29/2023    Procedure: R hip injection;  Surgeon: Tono Farah MD;  Location: [facility] OR;  Service: Orthopedics;  Laterality: Right;    Cataract surgery Bilateral     CERVICAL FUSION      COLONOSCOPY N/A 08/25/2022    Procedure: COLONOSCOPY;  Surgeon: Luis Felipe Mckeon III, MD;  [facility]: [facility];  Service: Endoscopy;  Laterality: N/A;    EPIDURAL STEROID INJECTION INTO CERVICAL SPINE      EPIDURAL STEROID INJECTION INTO LUMBAR SPINE      Fluoroscopy of spinal cord      INJECTION OF JOINT Right 07/27/2022    Procedure: Injection, Joint, Hip;  Surgeon: Tono Farah MD;  Location: [facility] OR;  Service:  Orthopedics;  Laterality: Right;    INSERTION OF PERMANENT PACEMAKER      INTRAOPERATIVE RADIATION THERAPY      LUNG REMOVAL, PARTIAL      Myelogram      PENILE PROSTHESIS IMPLANT      ROBOTIC ARTHROPLASTY,HIP,TOTAL,POSTERIOR APPROACH Right 1/31/2024    Procedure: ROBOTIC ARTHROPLASTY, HIP, TOTAL, POSTERIOR APPROACH;  Surgeon: Tono Farah MD;  Location: [facility];  Service: Orthopedics;  Laterality: Right;       Current Outpatient Medications   Medication Sig    atorvastatin (LIPITOR) 40 MG tablet Take 40 mg by mouth every evening.    bicalutamide (CASODEX) 50 MG Tab Take 50 mg by mouth every evening.    cetirizine (ZYRTEC) 10 MG tablet Take 10 mg by mouth once daily.    colchicine (COLCRYS) 0.6 mg tablet Take 1 tablet (0.6 mg total) by mouth 2 (two) times daily as needed (Take as needed twice a day for gout pain.). (Patient taking differently: Take 0.6 mg by mouth 2 (two) times daily.)    ferrous gluconate (FERGON) 324 MG tablet Take 1 tablet by mouth daily with breakfast.    gabapentin (NEURONTIN) 300 MG capsule TAKE TWO CAPSULES BY MOUTH THREE TIMES A DAY    HYDROcodone-acetaminophen (NORCO)  mg per tablet Take 1 tablet by mouth every 6 (six) hours as needed for Pain.    indomethacin (INDOCIN) 50 MG capsule Take 50 mg by mouth once daily.    isosorbide mononitrate (IMDUR) 30 MG 24 hr tablet Take 30 mg by mouth every evening.    lactulose (CHRONULAC) 10 gram/15 mL solution Take 30 g by mouth 2 (two) times daily as needed.    lisinopril (PRINIVIL,ZESTRIL) 20 MG tablet Take 20 mg by mouth once daily.    methocarbamoL (ROBAXIN) 750 MG Tab Take 500 mg by mouth 4 (four) times daily.    nitroGLYCERIN (NITROSTAT) 0.4 MG SL tablet Place 0.4 mg under the tongue every 5 (five) minutes as needed.    pantoprazole (PROTONIX) 40 MG tablet Take 40 mg by mouth once daily.    potassium citrate (UROCIT-K 15) 15 mEq TbSR Take 1 tablet by mouth 2 (two) times daily.    sertraline (ZOLOFT) 50 MG tablet TAKE ONE TABLET BY  MOUTH AT BEDTIME (Patient taking differently: Take 50 mg by mouth every evening.)    tamsulosin (FLOMAX) 0.4 mg Cap Take 0.4 mg by mouth every morning.    topiramate (TOPAMAX) 25 MG tablet Take 25 mg by mouth as needed.    aspirin 81 MG Chew Take 1 tablet (81 mg total) by mouth 2 (two) times a day. Blood clot prevention    methocarbamoL (ROBAXIN) 750 MG Tab Take 1 tablet (750 mg total) by mouth 3 (three) times daily. for 14 days     No current facility-administered medications for this visit.     Facility-Administered Medications Ordered in Other Visits   Medication    lactated ringers infusion    lactated ringers infusion    LIDOcaine (PF) 10 mg/ml (1%) injection 10 mg       Review of patient's allergies indicates:   Allergen Reactions    Proparacaine     Tropicamide      Other reaction(s): disoriented       Family History   Problem Relation Age of Onset    Heart disease Mother     Hypertension Mother     Heart disease Sister     Hypertension Sister     Heart disease Brother     Hypertension Brother        Social History     Socioeconomic History    Marital status:    Tobacco Use    Smoking status: Never    Smokeless tobacco: Never   Substance and Sexual Activity    Alcohol use: Yes     Alcohol/week: 1.0 standard drink of alcohol     Types: 1 Shots of liquor per week     Comment: RARE    Drug use: Never    Sexual activity: Yes     Partners: Female           Review of Systems:    Denies fevers, chills, chest pain, shortness of breath. Comprehensive review of systems performed and otherwise negative except as noted in HPI      Physical Examination:    General: awake and alert, no acute distress, healthy appearing  Head and Neck: Head atraumatic/normocephalic. Moist MM  CV: brisk cap refill  Lungs: non-labored breathing, w/o cough or SOB  Skin: no rashes present, warm to touch  Neuro: sensation grossly intact distall     Vital Signs:    Vitals:    03/21/24 1445   BP: 132/80   Pulse: 81       Body mass index is  31.74 kg/m².    Right hip exam:    Right hip incision clean dry and intact. No erythema, drainage, or signs of infection  No swelling distally. No signs of DVT  Brisk cap refill right foot  Sensation intact distally to right foot  Positive FHL/EHL/gastrocsoleus/tib ant    X-rays: 3 views of the right hip reviewed. Patient's implants appear well fixed. No signs of loosening or subsidence noted.     Assessment::post op status post R LARA    Plan:  Patient presents to clinic today in regards to his right hip.  His hip is stable on exam x-rays today here in clinic.  Educated that he can continue physical therapy or begin his at-home exercises as he does have several machine at home to do this with.  He does state understanding of this.  Regards to his hip precautions he can slowly come off as his hip tolerates.  Regards to pain medication we are unable to fill this due to him being on pain management.  Robaxin was refilled although in the clinic.  He is to follow up in clinic in 2 months to reassess his hip.  Patient states understanding and agrees with the plan of care.    This note was created using [employer] voice recognition software that occasionally misinterpreted phrases or words.    Consult note is delivered via Epic messaging service.

## 2024-04-27 ENCOUNTER — HOSPITAL ENCOUNTER (EMERGENCY)
Facility: HOSPITAL | Age: 74
Discharge: HOME | End: 2024-04-27
Attending: STUDENT IN AN ORGANIZED HEALTH CARE EDUCATION/TRAINING PROGRAM
Payer: MEDICARE

## 2024-04-27 VITALS
SYSTOLIC BLOOD PRESSURE: 151 MMHG | OXYGEN SATURATION: 97 % | WEIGHT: 230 LBS | DIASTOLIC BLOOD PRESSURE: 109 MMHG | TEMPERATURE: 98 F | HEART RATE: 61 BPM | BODY MASS INDEX: 31.19 KG/M2 | RESPIRATION RATE: 13 BRPM

## 2024-04-27 DIAGNOSIS — R52 PAIN: ICD-10-CM

## 2024-04-27 DIAGNOSIS — R55 SYNCOPE: ICD-10-CM

## 2024-04-27 DIAGNOSIS — S01.81XA FACIAL LACERATION, INITIAL ENCOUNTER: Primary | ICD-10-CM

## 2024-04-27 LAB
ALBUMIN SERPL-MCNC: 4 G/DL (ref 3.4–4.8)
ALBUMIN/GLOB SERPL: 1.1 RATIO (ref 1.1–2)
ALP SERPL-CCNC: 84 UNIT/L (ref 40–150)
ALT SERPL-CCNC: 31 UNIT/L (ref 0–55)
AMPHET UR QL SCN: NEGATIVE
APPEARANCE UR: CLEAR
APTT PPP: 24.3 SECONDS (ref 23.2–33.7)
AST SERPL-CCNC: 19 UNIT/L (ref 5–34)
BACTERIA #/AREA URNS AUTO: ABNORMAL /HPF
BARBITURATE SCN PRESENT UR: NEGATIVE
BASOPHILS # BLD AUTO: 0.02 X10(3)/MCL
BASOPHILS NFR BLD AUTO: 0.3 %
BENZODIAZ UR QL SCN: NEGATIVE
BILIRUB SERPL-MCNC: 0.3 MG/DL
BILIRUB UR QL STRIP.AUTO: NEGATIVE
BNP BLD-MCNC: <10 PG/ML
BUN SERPL-MCNC: 19.4 MG/DL (ref 8.4–25.7)
CALCIUM SERPL-MCNC: 9.4 MG/DL (ref 8.8–10)
CANNABINOIDS UR QL SCN: NEGATIVE
CHLORIDE SERPL-SCNC: 106 MMOL/L (ref 98–107)
CO2 SERPL-SCNC: 21 MMOL/L (ref 23–31)
COCAINE UR QL SCN: NEGATIVE
COLOR UR AUTO: YELLOW
CREAT SERPL-MCNC: 1.51 MG/DL (ref 0.73–1.18)
EOSINOPHIL # BLD AUTO: 0.11 X10(3)/MCL (ref 0–0.9)
EOSINOPHIL NFR BLD AUTO: 1.7 %
ERYTHROCYTE [DISTWIDTH] IN BLOOD BY AUTOMATED COUNT: 15.7 % (ref 11.5–17)
ETHANOL SERPL-MCNC: <10 MG/DL
FENTANYL UR QL SCN: NEGATIVE
GFR SERPLBLD CREATININE-BSD FMLA CKD-EPI: 48 MLS/MIN/1.73/M2
GLOBULIN SER-MCNC: 3.5 GM/DL (ref 2.4–3.5)
GLUCOSE SERPL-MCNC: 92 MG/DL (ref 82–115)
GLUCOSE UR QL STRIP.AUTO: NORMAL
HCT VFR BLD AUTO: 34.2 % (ref 42–52)
HGB BLD-MCNC: 10.9 G/DL (ref 14–18)
HYALINE CASTS #/AREA URNS LPF: ABNORMAL /LPF
IMM GRANULOCYTES # BLD AUTO: 0.03 X10(3)/MCL (ref 0–0.04)
IMM GRANULOCYTES NFR BLD AUTO: 0.5 %
INR PPP: 1.1
KETONES UR QL STRIP.AUTO: NEGATIVE
LEUKOCYTE ESTERASE UR QL STRIP.AUTO: NEGATIVE
LYMPHOCYTES # BLD AUTO: 0.77 X10(3)/MCL (ref 0.6–4.6)
LYMPHOCYTES NFR BLD AUTO: 11.8 %
MCH RBC QN AUTO: 31.8 PG (ref 27–31)
MCHC RBC AUTO-ENTMCNC: 31.9 G/DL (ref 33–36)
MCV RBC AUTO: 99.7 FL (ref 80–94)
MDMA UR QL SCN: NEGATIVE
MONOCYTES # BLD AUTO: 0.41 X10(3)/MCL (ref 0.1–1.3)
MONOCYTES NFR BLD AUTO: 6.3 %
MUCOUS THREADS URNS QL MICRO: ABNORMAL /LPF
NEUTROPHILS # BLD AUTO: 5.16 X10(3)/MCL (ref 2.1–9.2)
NEUTROPHILS NFR BLD AUTO: 79.4 %
NITRITE UR QL STRIP.AUTO: NEGATIVE
NRBC BLD AUTO-RTO: 0 %
OPIATES UR QL SCN: POSITIVE
PCP UR QL: NEGATIVE
PH UR STRIP.AUTO: 5.5 [PH]
PH UR: 5.5 [PH] (ref 3–11)
PLATELET # BLD AUTO: 218 X10(3)/MCL (ref 130–400)
PMV BLD AUTO: 10.8 FL (ref 7.4–10.4)
POTASSIUM SERPL-SCNC: 4.7 MMOL/L (ref 3.5–5.1)
PROT SERPL-MCNC: 7.5 GM/DL (ref 5.8–7.6)
PROT UR QL STRIP.AUTO: ABNORMAL
PROTHROMBIN TIME: 13.7 SECONDS (ref 12.5–14.5)
RBC # BLD AUTO: 3.43 X10(6)/MCL (ref 4.7–6.1)
RBC #/AREA URNS AUTO: ABNORMAL /HPF
RBC UR QL AUTO: NEGATIVE
SODIUM SERPL-SCNC: 138 MMOL/L (ref 136–145)
SP GR UR STRIP.AUTO: 1.03 (ref 1–1.03)
SPECIFIC GRAVITY, URINE AUTO (.000) (OHS): 1.03 (ref 1–1.03)
SQUAMOUS #/AREA URNS LPF: ABNORMAL /HPF
TROPONIN I SERPL-MCNC: <0.01 NG/ML (ref 0–0.04)
UROBILINOGEN UR STRIP-ACNC: NORMAL
WBC # SPEC AUTO: 6.5 X10(3)/MCL (ref 4.5–11.5)
WBC #/AREA URNS AUTO: ABNORMAL /HPF

## 2024-04-27 PROCEDURE — 80307 DRUG TEST PRSMV CHEM ANLYZR: CPT | Performed by: STUDENT IN AN ORGANIZED HEALTH CARE EDUCATION/TRAINING PROGRAM

## 2024-04-27 PROCEDURE — 90471 IMMUNIZATION ADMIN: CPT | Performed by: STUDENT IN AN ORGANIZED HEALTH CARE EDUCATION/TRAINING PROGRAM

## 2024-04-27 PROCEDURE — 84484 ASSAY OF TROPONIN QUANT: CPT | Performed by: STUDENT IN AN ORGANIZED HEALTH CARE EDUCATION/TRAINING PROGRAM

## 2024-04-27 PROCEDURE — 85730 THROMBOPLASTIN TIME PARTIAL: CPT | Performed by: STUDENT IN AN ORGANIZED HEALTH CARE EDUCATION/TRAINING PROGRAM

## 2024-04-27 PROCEDURE — 99285 EMERGENCY DEPT VISIT HI MDM: CPT | Mod: 25

## 2024-04-27 PROCEDURE — 63600175 PHARM REV CODE 636 W HCPCS: Performed by: STUDENT IN AN ORGANIZED HEALTH CARE EDUCATION/TRAINING PROGRAM

## 2024-04-27 PROCEDURE — 85610 PROTHROMBIN TIME: CPT | Performed by: STUDENT IN AN ORGANIZED HEALTH CARE EDUCATION/TRAINING PROGRAM

## 2024-04-27 PROCEDURE — 90715 TDAP VACCINE 7 YRS/> IM: CPT | Performed by: STUDENT IN AN ORGANIZED HEALTH CARE EDUCATION/TRAINING PROGRAM

## 2024-04-27 PROCEDURE — 83880 ASSAY OF NATRIURETIC PEPTIDE: CPT | Performed by: STUDENT IN AN ORGANIZED HEALTH CARE EDUCATION/TRAINING PROGRAM

## 2024-04-27 PROCEDURE — 80053 COMPREHEN METABOLIC PANEL: CPT | Performed by: STUDENT IN AN ORGANIZED HEALTH CARE EDUCATION/TRAINING PROGRAM

## 2024-04-27 PROCEDURE — 96374 THER/PROPH/DIAG INJ IV PUSH: CPT | Mod: 59

## 2024-04-27 PROCEDURE — 93010 ELECTROCARDIOGRAM REPORT: CPT | Mod: ,,, | Performed by: INTERNAL MEDICINE

## 2024-04-27 PROCEDURE — 12014 RPR F/E/E/N/L/M 5.1-7.5 CM: CPT

## 2024-04-27 PROCEDURE — 96375 TX/PRO/DX INJ NEW DRUG ADDON: CPT | Mod: 59

## 2024-04-27 PROCEDURE — 96361 HYDRATE IV INFUSION ADD-ON: CPT | Mod: 59

## 2024-04-27 PROCEDURE — 81001 URINALYSIS AUTO W/SCOPE: CPT | Performed by: STUDENT IN AN ORGANIZED HEALTH CARE EDUCATION/TRAINING PROGRAM

## 2024-04-27 PROCEDURE — 82077 ASSAY SPEC XCP UR&BREATH IA: CPT | Performed by: STUDENT IN AN ORGANIZED HEALTH CARE EDUCATION/TRAINING PROGRAM

## 2024-04-27 PROCEDURE — 85025 COMPLETE CBC W/AUTO DIFF WBC: CPT | Performed by: STUDENT IN AN ORGANIZED HEALTH CARE EDUCATION/TRAINING PROGRAM

## 2024-04-27 PROCEDURE — 93005 ELECTROCARDIOGRAM TRACING: CPT

## 2024-04-27 RX ORDER — LIDOCAINE HYDROCHLORIDE AND EPINEPHRINE 10; 10 MG/ML; UG/ML
10 INJECTION, SOLUTION INFILTRATION; PERINEURAL ONCE
Status: DISCONTINUED | OUTPATIENT
Start: 2024-04-27 | End: 2024-04-27 | Stop reason: HOSPADM

## 2024-04-27 RX ORDER — HYDROCODONE BITARTRATE AND ACETAMINOPHEN 5; 325 MG/1; MG/1
1 TABLET ORAL EVERY 12 HOURS PRN
Qty: 10 TABLET | Refills: 0 | Status: SHIPPED | OUTPATIENT
Start: 2024-04-27 | End: 2024-05-02

## 2024-04-27 RX ORDER — MORPHINE SULFATE 4 MG/ML
4 INJECTION, SOLUTION INTRAMUSCULAR; INTRAVENOUS
Status: COMPLETED | OUTPATIENT
Start: 2024-04-27 | End: 2024-04-27

## 2024-04-27 RX ORDER — ONDANSETRON HYDROCHLORIDE 2 MG/ML
4 INJECTION, SOLUTION INTRAVENOUS
Status: COMPLETED | OUTPATIENT
Start: 2024-04-27 | End: 2024-04-27

## 2024-04-27 RX ADMIN — TETANUS TOXOID, REDUCED DIPHTHERIA TOXOID AND ACELLULAR PERTUSSIS VACCINE, ADSORBED 0.5 ML: 5; 2.5; 8; 8; 2.5 SUSPENSION INTRAMUSCULAR at 06:04

## 2024-04-27 RX ADMIN — MORPHINE SULFATE 4 MG: 4 INJECTION INTRAVENOUS at 05:04

## 2024-04-27 RX ADMIN — SODIUM CHLORIDE, POTASSIUM CHLORIDE, SODIUM LACTATE AND CALCIUM CHLORIDE 1000 ML: 600; 310; 30; 20 INJECTION, SOLUTION INTRAVENOUS at 05:04

## 2024-04-27 RX ADMIN — ONDANSETRON 4 MG: 2 INJECTION INTRAMUSCULAR; INTRAVENOUS at 05:04

## 2024-04-27 NOTE — ED PROVIDER NOTES
Encounter Date: 4/27/2024    SCRIBE #1 NOTE: I, Roxi Rei, am scribing for, and in the presence of,  Kirill Alcaraz MD. I have scribed the following portions of the note - the EKG reading. Other sections scribed: HPI, ROS, PE.       History     Chief Complaint   Patient presents with    Fall    Loss of Consciousness     C/o syncopal episode today. +LOC. Lac to forehead noted. -BT. Bleeding controlled. Also reports HA. Denies any other complaints. Hx pacemaker.      Patient is a 73 year old male with a history of CAD, CKD, HTN, SVT, and a pacemaker that presents to the ED following a syncopal episode today. Patient states he was outside cleaning when he became hot; he states he went inside and passed out in his kitchen, hitting his head. He is complaining of a headache. He denies chest pain and shortness of breath.     The history is provided by the patient and medical records. No  was used.     Review of patient's allergies indicates:   Allergen Reactions    Proparacaine     Tropicamide      Other reaction(s): disoriented     Past Medical History:   Diagnosis Date    Adjustment disorder     Bilateral carpal tunnel syndrome     CAD (coronary artery disease)     Cervical radiculopathy     Chronic pain syndrome     CKD (chronic kidney disease)     Gout     HTN (hypertension)     Lumbar radiculopathy     Osteoarthritis     Pacemaker     Prostate cancer     Sleep apnea     Does not sleep with c-pap    Unspecified glaucoma      Past Surgical History:   Procedure Laterality Date    APPENDECTOMY      ARTHROCENTESIS OF HIP JOINT Right 03/29/2023    Procedure: R hip injection;  Surgeon: Tono Farah MD;  Location: [facility];  Service: Orthopedics;  Laterality: Right;    Cataract surgery Bilateral     CERVICAL FUSION      COLONOSCOPY N/A 08/25/2022    Procedure: COLONOSCOPY;  Surgeon: Luis Felipe Mckeon III, MD;  Location: [facility];  Service: Endoscopy;  Laterality: N/A;    EPIDURAL STEROID INJECTION  INTO CERVICAL SPINE      EPIDURAL STEROID INJECTION INTO LUMBAR SPINE      Fluoroscopy of spinal cord      INJECTION OF JOINT Right 07/27/2022    Procedure: Injection, Joint, Hip;  Surgeon: Tono Farah MD;  Location: [facility] OR;  Service: Orthopedics;  Laterality: Right;    INSERTION OF PERMANENT PACEMAKER      INTRAOPERATIVE RADIATION THERAPY      LUNG REMOVAL, PARTIAL      Myelogram      PENILE PROSTHESIS IMPLANT      ROBOTIC ARTHROPLASTY,HIP,TOTAL,POSTERIOR APPROACH Right 1/31/2024    Procedure: ROBOTIC ARTHROPLASTY, HIP, TOTAL, POSTERIOR APPROACH;  Surgeon: Tono Farah MD;  Location: [facility] OR;  Service: Orthopedics;  Laterality: Right;     Family History   Problem Relation Name Age of Onset    Heart disease Mother      Hypertension Mother      Heart disease Sister      Hypertension Sister      Heart disease Brother      Hypertension Brother       Social History     Tobacco Use    Smoking status: Never    Smokeless tobacco: Never   Substance Use Topics    Alcohol use: Yes     Alcohol/week: 1.0 standard drink of alcohol     Types: 1 Shots of liquor per week     Comment: RARE    Drug use: Never     Review of Systems   Constitutional:  Negative for fever.   HENT:  Negative for sore throat.    Eyes:  Negative for visual disturbance.   Respiratory:  Negative for shortness of breath.    Cardiovascular:  Negative for chest pain.   Gastrointestinal:  Negative for abdominal pain.   Genitourinary:  Negative for dysuria.   Musculoskeletal:  Negative for joint swelling.   Skin:  Negative for rash.   Neurological:  Positive for syncope and headaches. Negative for weakness.   Psychiatric/Behavioral:  Negative for confusion.    All other systems reviewed and are negative.      Physical Exam     Initial Vitals [04/27/24 1313]   BP Pulse Resp Temp SpO2   139/86 (!) 56 18 97.5 °F (36.4 °C) 100 %      MAP       --         Physical Exam    Nursing note and vitals reviewed.  Constitutional: He appears well-developed and  well-nourished. He is not diaphoretic. No distress.   HENT:   Head: Normocephalic and atraumatic.   6 cm vertical laceration to mid-forehead.   Eyes: Conjunctivae and EOM are normal. Pupils are equal, round, and reactive to light.   Neck:   Normal range of motion.  Cardiovascular:  Normal rate, regular rhythm, normal heart sounds and intact distal pulses.           No murmur heard.  Pulmonary/Chest: Breath sounds normal. No respiratory distress. He has no wheezes. He has no rales.   Pacemaker to chest wall   Abdominal: Abdomen is soft. He exhibits no distension. There is no abdominal tenderness.   Musculoskeletal:         General: No tenderness or edema. Normal range of motion.      Cervical back: Normal range of motion.     Neurological: He is alert and oriented to person, place, and time. No cranial nerve deficit.   Skin: Skin is warm and dry. Capillary refill takes less than 2 seconds. No rash noted. No erythema.   Psychiatric: He has a normal mood and affect.         ED Course   Lac Repair    Date/Time: 4/27/2024 3:58 PM    Performed by: Kirill Alcaraz MD  Authorized by: Kirill Alcaraz MD    Consent:     Consent obtained:  Verbal    Consent given by:  Patient    Risks, benefits, and alternatives were discussed: yes      Risks discussed:  Need for additional repair, nerve damage, infection, pain, poor cosmetic result, poor wound healing, tendon damage, retained foreign body and vascular damage    Alternatives discussed:  No treatment  Universal protocol:     Procedure explained and questions answered to patient or proxy's satisfaction: yes      Relevant documents present and verified: yes      Test results available: yes      Imaging studies available: yes      Required blood products, implants, devices, and special equipment available: yes      Site/side marked: yes      Immediately prior to procedure, a time out was called: yes      Patient identity confirmed:  Verbally with patient and arm  band  Anesthesia:     Anesthesia method:  Local infiltration    Local anesthetic:  Lidocaine 2% WITH epi  Laceration details:     Location:  Face    Face location:  Forehead    Length (cm):  6  Pre-procedure details:     Preparation:  Patient was prepped and draped in usual sterile fashion and imaging obtained to evaluate for foreign bodies  Skin repair:     Repair method:  Sutures    Suture size:  5-0    Suture material:  Fast-absorbing gut    Suture technique:  Simple interrupted    Number of sutures:  9  Approximation:     Approximation:  Close  Repair type:     Repair type:  Simple  Post-procedure details:     Procedure completion:  Tolerated well, no immediate complications    Labs Reviewed   COMPREHENSIVE METABOLIC PANEL - Abnormal; Notable for the following components:       Result Value    CO2 21 (*)     Creatinine 1.51 (*)     All other components within normal limits   DRUG SCREEN, URINE (BEAKER) - Abnormal; Notable for the following components:    Opiates, Urine Positive (*)     All other components within normal limits    Narrative:     Cut off concentrations:    Amphetamines - 1000 ng/ml  Barbiturates - 200 ng/ml  Benzodiazepine - 200 ng/ml  Cannabinoids (THC) - 50 ng/ml  Cocaine - 300 ng/ml  Fentanyl - 1.0 ng/ml  MDMA - 500 ng/ml  Opiates - 300 ng/ml   Phencyclidine (PCP) - 25 ng/ml    Specimen submitted for drug analysis and tested for pH and specific gravity in order to evaluate sample integrity. Suspect tampering if specific gravity is <1.003 and/or pH is not within the range of 4.5 - 8.0  False negatives may result form substances such as bleach added to urine.  False positives may result for the presence of a substance with similar chemical structure to the drug or its metabolite.    This test provides only a PRELIMINARY analytical test result. A more specific alternate chemical method must be used in order to obtain a confirmed analytical result. Gas chromatography/mass spectrometry (GC/MS) is  the preferred confirmatory method. Other chemical confirmation methods are available. Clinical consideration and professional judgement should be applied to any drug of abuse test result, particularly when preliminary positive results are used.    Positive results will be confirmed only at the physicians request. Unconfirmed screening results are to be used only for medical purposes (treatment).        URINALYSIS, REFLEX TO URINE CULTURE - Abnormal; Notable for the following components:    Protein, UA Trace (*)     Mucous, UA Trace (*)     Hyaline Casts, UA 6-10 (*)     All other components within normal limits   CBC WITH DIFFERENTIAL - Abnormal; Notable for the following components:    RBC 3.43 (*)     Hgb 10.9 (*)     Hct 34.2 (*)     MCV 99.7 (*)     MCH 31.8 (*)     MCHC 31.9 (*)     MPV 10.8 (*)     All other components within normal limits   ALCOHOL,MEDICAL (ETHANOL) - Normal   B-TYPE NATRIURETIC PEPTIDE - Normal   APTT - Normal   PROTIME-INR - Normal   TROPONIN I - Normal   CBC W/ AUTO DIFFERENTIAL    Narrative:     The following orders were created for panel order CBC auto differential.  Procedure                               Abnormality         Status                     ---------                               -----------         ------                     CBC with Differential[[id number]]       Abnormal            Final result                 Please view results for these tests on the individual orders.     EKG Readings: (Independently Interpreted)   Initial Reading: No STEMI. Rhythm: Paced Rhythm. Heart Rate: 61. Ectopy: No Ectopy. Conduction: Normal. ST Segments: Normal ST Segments. T Waves: Normal. Axis: Normal. Clinical Impression: Normal Sinus Rhythm   1325     ECG Results              EKG 12-lead (Final result)        Collection Time Result Time QRS Duration OHS QTC Calculation    04/27/24 13:25:11 04/30/24 15:07:08 98 436                     Final result by Interface, Lab In [facility] (04/30/24  15:07:12)                   Narrative:    Test Reason : R55,    Vent. Rate : 061 BPM     Atrial Rate : 061 BPM     P-R Int : 226 ms          QRS Dur : 098 ms      QT Int : 434 ms       P-R-T Axes : 028 -01 010 degrees     QTc Int : 436 ms    Atrial-paced rhythm with prolonged AV conduction  Minimal voltage criteria for LVH, may be normal variant ( R in aVL )  Abnormal ECG  When compared with ECG of 22-JAN-2024 11:23,  Electronic atrial pacemaker has replaced Sinus rhythm  Vent. rate has decreased BY  39 BPM  Confirmed by Kayden Handy MD ([id number]) on 4/30/2024 3:07:01 PM    Referred By: [email or web address]   SELF           Confirmed By:Kayden Handy MD                                     EKG 12-lead (Final result)  Result time 05/02/24 14:58:54      Final result by Unknown User (05/02/24 14:58:54)                                      Imaging Results              X-Ray Chest AP Portable (Final result)  Result time 04/27/24 14:07:25      Final result by Blanca Salcedo MD (04/27/24 14:07:25)                   Impression:      No acute abnormality of the chest.      Electronically signed by: Blanca Salcedo  Date:    04/27/2024  Time:    14:07               Narrative:    EXAMINATION:  XR CHEST AP PORTABLE    CLINICAL HISTORY:  Pain, unspecified    COMPARISON:  Chest x-ray dated 01/22/2024    FINDINGS:  Left chest wall pacemaker is in place.  The heart is stable in size.  There is no focal airspace consolidation.  There is no pleural effusion or visible pneumothorax.                                       CT Head Without Contrast (Final result)  Result time 04/27/24 14:01:03      Final result by Blanca Salcedo MD (04/27/24 14:01:03)                   Impression:      No acute intracranial abnormality.      Electronically signed by: Blanca Salcedo  Date:    04/27/2024  Time:    14:01               Narrative:    EXAMINATION:  CT HEAD WITHOUT CONTRAST    CLINICAL HISTORY:  Fall, hit head, laceration, LOC;    TECHNIQUE:  Axial  scans were obtained from skull base to the vertex.    Coronal and sagittal reconstructions obtained from the axial data.    Automatic exposure control was utilized to limit radiation dose.    Contrast: None    Radiation Dose:    Total DLP: 998 mGy*cm    COMPARISON:  CT head dated 09/22/2020    FINDINGS:  There is no acute intracranial hemorrhage or edema. The gray-white matter differentiation is preserved.    There is no mass effect or midline shift.  There is diffuse parenchymal volume loss.  The basal cisterns are patent. There is no abnormal extra-axial fluid collection.    The calvarium and skull base are intact.  There is mild scattered paranasal sinus mucosal thickening.                                       CT Cervical Spine Without Contrast (Final result)  Result time 04/27/24 14:03:06      Final result by Blanca Salcedo MD (04/27/24 14:03:06)                   Impression:      No acute fracture identified.      Electronically signed by: Blanca Salcedo  Date:    04/27/2024  Time:    14:03               Narrative:    EXAMINATION:  CT CERVICAL SPINE WITHOUT CONTRAST    CLINICAL HISTORY:  Fall,;    TECHNIQUE:  Noncontrast CT images of the cervical spine. Axial, coronal, and sagittal reformatted images were obtained. Dose length product is 428 mGycm. Automatic exposure control, adjustment of mA/kV or iterative reconstruction technique was used to limit radiation dose.    COMPARISON:  CT myelogram dated 04/26/2021    FINDINGS:  The cervical spine is visualized to the level of T1.    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  There is no acute fracture identified.  There are multilevel degenerative changes with marginal osteophytes and facet arthropathy.  There is no paraspinal hematoma.                                       Medications   lactated ringers bolus 1,000 mL (0 mLs Intravenous Stopped 4/27/24 1805)   morphine injection 4 mg (4 mg Intravenous Given 4/27/24 1730)   ondansetron injection 4  mg (4 mg Intravenous Given 4/27/24 1730)   Tdap (BOOSTRIX) vaccine injection 0.5 mL (0.5 mLs Intramuscular Given 4/27/24 1807)     Medical Decision Making  Problems Addressed:  Facial laceration, initial encounter: acute illness or injury that poses a threat to life or bodily functions  Pain: acute illness or injury that poses a threat to life or bodily functions  Syncope: acute illness or injury that poses a threat to life or bodily functions    Amount and/or Complexity of Data Reviewed  Independent Historian:      Details: Collateral from family at bedside.  External Data Reviewed: ECG and notes.     Details: Review pacemaker interrogation.  Labs: ordered.  Radiology: ordered and independent interpretation performed. Decision-making details documented in ED Course.  ECG/medicine tests: ordered and independent interpretation performed.    Risk  Prescription drug management.  Parenteral controlled substances.  Decision regarding hospitalization.            Scribe Attestation:   Scribe #1: I performed the above scribed service and the documentation accurately describes the services I performed. I attest to the accuracy of the note.    Attending Attestation:           Physician Attestation for Scribe:  Physician Attestation Statement for Scribe #1: I, Kirill Alcaraz MD, reviewed documentation, as scribed by Roxi Minaya in my presence, and it is both accurate and complete.             ED Course as of 05/27/24 0709   Sat Apr 27, 2024   1613 CT Head Without Contrast [RP]   1839 Paged cardiology [MB]   1848 Pacemaker interrogated.  No acute events from today.  It appears that the patient did have some runs of AFib on April 23rd for which he was paced out of. [RP]   1849 Shared decision making used to determine disposition.  Offered admission.  Patient states he would like to go home.  Pacemaker has been interrogated.  His troponins within normal limits.  His workup is otherwise unremarkable. [RP]   1855 Discussed with  Keegan CASTANEDA.  [RP]      ED Course User Index  [MB] Tea Heller  [RP] Kirill Alcaraz MD                 Medical Decision Making:   History:   I obtained history from: someone other than patient.       <> Summary of History: Collateral from family at bedside.  Old Medical Records: I decided to obtain old medical records.  Old Records Summarized: records from clinic visits, records from previous admission(s) and records from another hospital.       <> Summary of Records: Reviewed old records  Initial Assessment:   Syncope  Differential Diagnosis:   Judging by the patient's chief complaint and pertinent history, the patient has the following possible differential diagnoses, including but not limited to the following.  Some of these are deemed to be lower likelihood and some more likely based on my physical exam and history combined with possible lab work and/or imaging studies.   Please see the pertinent studies, and refer to the HPI.  Some of these diagnoses will take further evaluation to fully rule out, perhaps as an outpatient and the patient was encouraged to follow up when discharged for more comprehensive evaluation.    Arrhythmia, dehydration, orthostatic episode, intracranial hemorrhage, seizure, anemia, infection, electrolyte derangement, drug use, hypoglycemic episode, ACS, PE, structural heart disease,  Independently Interpreted Test(s):   I have ordered and independently interpreted X-rays - see prior notes.  I have ordered and independently interpreted EKG Reading(s) - see prior notes  Clinical Tests:   Lab Tests: Reviewed and Ordered  Radiological Study: Ordered and Reviewed  Medical Tests: Ordered and Reviewed  ED Management:  Patient is a 73-year-old male presents to emergency department for syncopal episode.  See HPI.  See physical exam.  Laceration to the head.  Cleaned irrigated repaired.  Tetanus has already up-to-date.  Etiology of the patient's syncopal episode initiated.  CT of the head and neck  obtained as the patient did strike his head with a large laceration noted.  No intracranial hemorrhage.  Labs and imaging obtained.  EKG with paced rhythm.  LVH.  No STEMI.  Lab work as noted.  Troponin within normal limits.  Pacemaker interrogated.  No runs of abnormal dysrhythmia today.  Discussed case with Cardiology was also evaluated pacemaker.  The patient is having some runs of AFib none today.  Discussed need for follow-up with primary care provider.  Discussed need for follow-up with cardiology.  Shared decision making used to determine disposition.  Given patient's heart history, reasonable to continue observation.  Patient states he would prefer to go home. Reassessed patient.  Patient is resting comfortably.  Discussed all results.  Discussed need for follow-up.  Discussed return precautions.  Answered all questions at this time.  Hemodynamically stable for continued outpatient management with strict return precautions.  Patient and family verbalized understanding agreed to plan.                     Clinical Impression:  Final diagnoses:  [R55] Syncope  [R52] Pain  [S01.81XA] Facial laceration, initial encounter (Primary)          ED Disposition Condition    Discharge Stable          ED Prescriptions       Medication Sig Dispense Start Date End Date Auth. Provider    HYDROcodone-acetaminophen (NORCO) 5-325 mg per tablet () Take 1 tablet by mouth every 12 (twelve) hours as needed for Pain. 10 tablet 2024 Kirill Alcaraz MD          Follow-up Information       Follow up With Specialties Details Why Contact Info    Florin Birmingham MD Family Medicine   [address]  [place] [address]  [phone]      Primary Care  Call in 1 day  Please call [phone] for a primary care provider.    Yoav Valderrama MD Cardiology   [address] [facility] [address]  [phone]      Florin Birmingham MD Family Medicine   [address]  [place]  [address]  [phone]               Kirill Alcaraz MD  05/27/24 0712

## 2024-04-28 NOTE — ED NOTES
Pt. Dc home he states understanding of dc rx and the need for follow up with his cardiology. Pt was instructed on basic wound care and s/s of infection. Pt has no further questions at this time

## 2024-04-28 NOTE — DISCHARGE INSTRUCTIONS
Follow-up with the primary care physician.      Follow-up with the cardiologist in 1-2 days.      Return to the emergency department for any new or worsening symptoms.      Your stitches will dissolve on their own.  Continue to closely monitor.  Return to the emergency department for any drainage, fever, nausea, vomiting, difficulty breathing, headache, or any other symptoms.

## 2024-04-30 LAB
OHS QRS DURATION: 98 MS
OHS QTC CALCULATION: 436 MS

## 2024-10-25 ENCOUNTER — LAB VISIT (OUTPATIENT)
Dept: LAB | Facility: HOSPITAL | Age: 74
End: 2024-10-25
Attending: FAMILY MEDICINE
Payer: MEDICARE

## 2024-10-25 DIAGNOSIS — Z13.220 SCREENING FOR LIPOID DISORDERS: ICD-10-CM

## 2024-10-25 DIAGNOSIS — Z11.59 SCREENING EXAMINATION FOR POLIOMYELITIS: Primary | ICD-10-CM

## 2024-10-25 DIAGNOSIS — E55.9 VITAMIN D DEFICIENCY: ICD-10-CM

## 2024-10-25 LAB
25(OH)D3+25(OH)D2 SERPL-MCNC: 54 NG/ML (ref 30–80)
ALBUMIN SERPL-MCNC: 3.8 G/DL (ref 3.4–4.8)
ALBUMIN/GLOB SERPL: 1 RATIO (ref 1.1–2)
ALP SERPL-CCNC: 69 UNIT/L (ref 40–150)
ALT SERPL-CCNC: 22 UNIT/L (ref 0–55)
ANION GAP SERPL CALC-SCNC: 6 MEQ/L
AST SERPL-CCNC: 21 UNIT/L (ref 5–34)
BASOPHILS # BLD AUTO: 0.03 X10(3)/MCL
BASOPHILS NFR BLD AUTO: 0.7 %
BILIRUB SERPL-MCNC: 0.4 MG/DL
BUN SERPL-MCNC: 20 MG/DL (ref 8.4–25.7)
CALCIUM SERPL-MCNC: 9.6 MG/DL (ref 8.8–10)
CHLORIDE SERPL-SCNC: 108 MMOL/L (ref 98–107)
CHOLEST SERPL-MCNC: 192 MG/DL
CHOLEST/HDLC SERPL: 4 {RATIO} (ref 0–5)
CO2 SERPL-SCNC: 26 MMOL/L (ref 23–31)
CREAT SERPL-MCNC: 1.25 MG/DL (ref 0.72–1.25)
CREAT/UREA NIT SERPL: 16
EOSINOPHIL # BLD AUTO: 0.28 X10(3)/MCL (ref 0–0.9)
EOSINOPHIL NFR BLD AUTO: 6.9 %
ERYTHROCYTE [DISTWIDTH] IN BLOOD BY AUTOMATED COUNT: 13.7 % (ref 11.5–17)
EST. AVERAGE GLUCOSE BLD GHB EST-MCNC: 85.3 MG/DL
GFR SERPLBLD CREATININE-BSD FMLA CKD-EPI: 60 ML/MIN/1.73/M2
GLOBULIN SER-MCNC: 4 GM/DL (ref 2.4–3.5)
GLUCOSE SERPL-MCNC: 92 MG/DL (ref 82–115)
HBA1C MFR BLD: 4.6 %
HCT VFR BLD AUTO: 34.9 % (ref 42–52)
HDLC SERPL-MCNC: 49 MG/DL (ref 35–60)
HGB BLD-MCNC: 11.4 G/DL (ref 14–18)
IMM GRANULOCYTES # BLD AUTO: 0.01 X10(3)/MCL (ref 0–0.04)
IMM GRANULOCYTES NFR BLD AUTO: 0.2 %
LDLC SERPL CALC-MCNC: 111 MG/DL (ref 50–140)
LYMPHOCYTES # BLD AUTO: 1.08 X10(3)/MCL (ref 0.6–4.6)
LYMPHOCYTES NFR BLD AUTO: 26.7 %
MAGNESIUM SERPL-MCNC: 2.1 MG/DL (ref 1.6–2.6)
MCH RBC QN AUTO: 33.4 PG (ref 27–31)
MCHC RBC AUTO-ENTMCNC: 32.7 G/DL (ref 33–36)
MCV RBC AUTO: 102.3 FL (ref 80–94)
MONOCYTES # BLD AUTO: 0.28 X10(3)/MCL (ref 0.1–1.3)
MONOCYTES NFR BLD AUTO: 6.9 %
NEUTROPHILS # BLD AUTO: 2.37 X10(3)/MCL (ref 2.1–9.2)
NEUTROPHILS NFR BLD AUTO: 58.6 %
PLATELET # BLD AUTO: 200 X10(3)/MCL (ref 130–400)
PMV BLD AUTO: 11.1 FL (ref 7.4–10.4)
POTASSIUM SERPL-SCNC: 4.2 MMOL/L (ref 3.5–5.1)
PROT SERPL-MCNC: 7.8 GM/DL (ref 5.8–7.6)
PSA SERPL-MCNC: <0.1 NG/ML
RBC # BLD AUTO: 3.41 X10(6)/MCL (ref 4.7–6.1)
SODIUM SERPL-SCNC: 140 MMOL/L (ref 136–145)
TRIGL SERPL-MCNC: 161 MG/DL (ref 34–140)
TSH SERPL-ACNC: 1.56 UIU/ML (ref 0.35–4.94)
VLDLC SERPL CALC-MCNC: 32 MG/DL
WBC # BLD AUTO: 4.05 X10(3)/MCL (ref 4.5–11.5)

## 2024-10-25 PROCEDURE — 84153 ASSAY OF PSA TOTAL: CPT

## 2024-10-25 PROCEDURE — 80053 COMPREHEN METABOLIC PANEL: CPT

## 2024-10-25 PROCEDURE — 83036 HEMOGLOBIN GLYCOSYLATED A1C: CPT

## 2024-10-25 PROCEDURE — 80061 LIPID PANEL: CPT

## 2024-10-25 PROCEDURE — 36415 COLL VENOUS BLD VENIPUNCTURE: CPT

## 2024-10-25 PROCEDURE — 87522 HEPATITIS C REVRS TRNSCRPJ: CPT

## 2024-10-25 PROCEDURE — 82306 VITAMIN D 25 HYDROXY: CPT

## 2024-10-25 PROCEDURE — 85025 COMPLETE CBC W/AUTO DIFF WBC: CPT

## 2024-10-25 PROCEDURE — 84443 ASSAY THYROID STIM HORMONE: CPT

## 2024-10-25 PROCEDURE — 83735 ASSAY OF MAGNESIUM: CPT

## 2024-10-27 LAB — MAYO GENERIC ORDERABLE RESULT: NORMAL

## 2025-02-25 ENCOUNTER — HOSPITAL ENCOUNTER (EMERGENCY)
Facility: HOSPITAL | Age: 75
Discharge: HOME | End: 2025-02-25
Attending: EMERGENCY MEDICINE
Payer: MEDICARE

## 2025-02-25 VITALS
BODY MASS INDEX: 27.09 KG/M2 | RESPIRATION RATE: 18 BRPM | HEIGHT: 72 IN | WEIGHT: 200 LBS | DIASTOLIC BLOOD PRESSURE: 89 MMHG | SYSTOLIC BLOOD PRESSURE: 150 MMHG | TEMPERATURE: 98 F | OXYGEN SATURATION: 98 % | HEART RATE: 65 BPM

## 2025-02-25 DIAGNOSIS — R51.9 NONINTRACTABLE HEADACHE, UNSPECIFIED CHRONICITY PATTERN, UNSPECIFIED HEADACHE TYPE: ICD-10-CM

## 2025-02-25 DIAGNOSIS — I10 PRIMARY HYPERTENSION: ICD-10-CM

## 2025-02-25 DIAGNOSIS — R40.4 TRANSIENT ALTERATION OF AWARENESS: Primary | ICD-10-CM

## 2025-02-25 DIAGNOSIS — R41.82 ALTERED MENTAL STATUS: ICD-10-CM

## 2025-02-25 LAB
ACCEPTIBLE SP GR UR QL: 1.02 (ref 1–1.03)
ALBUMIN SERPL-MCNC: 3.7 G/DL (ref 3.4–4.8)
ALBUMIN/GLOB SERPL: 0.9 RATIO (ref 1.1–2)
ALP SERPL-CCNC: 70 UNIT/L (ref 40–150)
ALT SERPL-CCNC: 17 UNIT/L (ref 0–55)
AMMONIA PLAS-MSCNC: 66.3 UMOL/L (ref 18–72)
AMPHET UR QL SCN: NEGATIVE
ANION GAP SERPL CALC-SCNC: 9 MEQ/L
APTT PPP: 27 SECONDS (ref 24.2–35.9)
AST SERPL-CCNC: 18 UNIT/L (ref 5–34)
BARBITURATE SCN PRESENT UR: NEGATIVE
BASOPHILS # BLD AUTO: 0.02 X10(3)/MCL
BASOPHILS NFR BLD AUTO: 0.3 %
BENZODIAZ UR QL SCN: NEGATIVE
BILIRUB SERPL-MCNC: 0.3 MG/DL
BILIRUB UR QL STRIP.AUTO: NEGATIVE
BUN SERPL-MCNC: 23 MG/DL (ref 8.4–25.7)
CALCIUM SERPL-MCNC: 9.4 MG/DL (ref 8.8–10)
CANNABINOIDS UR QL SCN: NEGATIVE
CHLORIDE SERPL-SCNC: 106 MMOL/L (ref 98–107)
CLARITY UR: CLEAR
CO2 SERPL-SCNC: 23 MMOL/L (ref 23–31)
COCAINE UR QL SCN: NEGATIVE
COLOR UR AUTO: YELLOW
CREAT SERPL-MCNC: 1.17 MG/DL (ref 0.72–1.25)
CREAT/UREA NIT SERPL: 20
EOSINOPHIL # BLD AUTO: 0.27 X10(3)/MCL (ref 0–0.9)
EOSINOPHIL NFR BLD AUTO: 4.2 %
ERYTHROCYTE [DISTWIDTH] IN BLOOD BY AUTOMATED COUNT: 13.4 % (ref 11.5–17)
ETHANOL SERPL-MCNC: <10 MG/DL
FENTANYL UR QL SCN: NEGATIVE
FLUAV AG UPPER RESP QL IA.RAPID: NOT DETECTED
FLUBV AG UPPER RESP QL IA.RAPID: NOT DETECTED
GFR SERPLBLD CREATININE-BSD FMLA CKD-EPI: >60 ML/MIN/1.73/M2
GLOBULIN SER-MCNC: 4 GM/DL (ref 2.4–3.5)
GLUCOSE SERPL-MCNC: 104 MG/DL (ref 82–115)
GLUCOSE UR QL STRIP: NEGATIVE
HCT VFR BLD AUTO: 31.5 % (ref 42–52)
HGB BLD-MCNC: 10.3 G/DL (ref 14–18)
HGB UR QL STRIP: NEGATIVE
IMM GRANULOCYTES # BLD AUTO: 0.01 X10(3)/MCL (ref 0–0.04)
IMM GRANULOCYTES NFR BLD AUTO: 0.2 %
INR PPP: 1
KETONES UR QL STRIP: NEGATIVE
LEUKOCYTE ESTERASE UR QL STRIP: NEGATIVE
LYMPHOCYTES # BLD AUTO: 1.96 X10(3)/MCL (ref 0.6–4.6)
LYMPHOCYTES NFR BLD AUTO: 30.8 %
MAGNESIUM SERPL-MCNC: 1.9 MG/DL (ref 1.6–2.6)
MCH RBC QN AUTO: 31.6 PG (ref 27–31)
MCHC RBC AUTO-ENTMCNC: 32.7 G/DL (ref 33–36)
MCV RBC AUTO: 96.6 FL (ref 80–94)
MDMA UR QL SCN: NEGATIVE
MONOCYTES # BLD AUTO: 0.56 X10(3)/MCL (ref 0.1–1.3)
MONOCYTES NFR BLD AUTO: 8.8 %
NEUTROPHILS # BLD AUTO: 3.55 X10(3)/MCL (ref 2.1–9.2)
NEUTROPHILS NFR BLD AUTO: 55.7 %
NITRITE UR QL STRIP: NEGATIVE
NRBC BLD AUTO-RTO: 0 %
OHS QRS DURATION: 132 MS
OHS QTC CALCULATION: 469 MS
OPIATES UR QL SCN: NEGATIVE
PCP UR QL: NEGATIVE
PH UR STRIP: 5.5 [PH]
PH UR: 6.5 [PH] (ref 3–11)
PLATELET # BLD AUTO: 204 X10(3)/MCL (ref 130–400)
PMV BLD AUTO: 10.4 FL (ref 7.4–10.4)
POCT GLUCOSE: 106 MG/DL (ref 70–110)
POTASSIUM SERPL-SCNC: 3.8 MMOL/L (ref 3.5–5.1)
PROT SERPL-MCNC: 7.7 GM/DL (ref 5.8–7.6)
PROT UR QL STRIP: NEGATIVE
PROTHROMBIN TIME: 13.4 SECONDS (ref 12.4–14.9)
RBC # BLD AUTO: 3.26 X10(6)/MCL (ref 4.7–6.1)
RSV A 5' UTR RNA NPH QL NAA+PROBE: NOT DETECTED
SARS-COV-2 RNA RESP QL NAA+PROBE: NOT DETECTED
SODIUM SERPL-SCNC: 138 MMOL/L (ref 136–145)
SP GR UR STRIP.AUTO: 1.02 (ref 1–1.03)
TROPONIN I SERPL-MCNC: <0.01 NG/ML (ref 0–0.04)
TSH SERPL-ACNC: 0.85 UIU/ML (ref 0.35–4.94)
UROBILINOGEN UR STRIP-ACNC: 0.2
WBC # BLD AUTO: 6.37 X10(3)/MCL (ref 4.5–11.5)

## 2025-02-25 PROCEDURE — 80053 COMPREHEN METABOLIC PANEL: CPT | Performed by: EMERGENCY MEDICINE

## 2025-02-25 PROCEDURE — 80307 DRUG TEST PRSMV CHEM ANLYZR: CPT | Performed by: EMERGENCY MEDICINE

## 2025-02-25 PROCEDURE — 82140 ASSAY OF AMMONIA: CPT | Performed by: EMERGENCY MEDICINE

## 2025-02-25 PROCEDURE — 84484 ASSAY OF TROPONIN QUANT: CPT | Performed by: EMERGENCY MEDICINE

## 2025-02-25 PROCEDURE — 82962 GLUCOSE BLOOD TEST: CPT

## 2025-02-25 PROCEDURE — 93005 ELECTROCARDIOGRAM TRACING: CPT

## 2025-02-25 PROCEDURE — 0241U COVID/RSV/FLU A&B PCR: CPT | Performed by: EMERGENCY MEDICINE

## 2025-02-25 PROCEDURE — 83735 ASSAY OF MAGNESIUM: CPT | Performed by: EMERGENCY MEDICINE

## 2025-02-25 PROCEDURE — 85025 COMPLETE CBC W/AUTO DIFF WBC: CPT | Performed by: EMERGENCY MEDICINE

## 2025-02-25 PROCEDURE — 93010 ELECTROCARDIOGRAM REPORT: CPT | Mod: ,,, | Performed by: INTERNAL MEDICINE

## 2025-02-25 PROCEDURE — 99285 EMERGENCY DEPT VISIT HI MDM: CPT | Mod: 25

## 2025-02-25 PROCEDURE — 82077 ASSAY SPEC XCP UR&BREATH IA: CPT | Performed by: EMERGENCY MEDICINE

## 2025-02-25 PROCEDURE — 85730 THROMBOPLASTIN TIME PARTIAL: CPT | Performed by: EMERGENCY MEDICINE

## 2025-02-25 PROCEDURE — 81003 URINALYSIS AUTO W/O SCOPE: CPT | Performed by: EMERGENCY MEDICINE

## 2025-02-25 PROCEDURE — 85610 PROTHROMBIN TIME: CPT | Performed by: EMERGENCY MEDICINE

## 2025-02-25 PROCEDURE — 84443 ASSAY THYROID STIM HORMONE: CPT | Performed by: EMERGENCY MEDICINE

## 2025-02-25 RX ORDER — TRAMADOL HYDROCHLORIDE 50 MG/1
50 TABLET ORAL EVERY 6 HOURS PRN
Qty: 20 TABLET | Refills: 0 | Status: SHIPPED | OUTPATIENT
Start: 2025-02-25 | End: 2025-02-25

## 2025-02-25 RX ORDER — TRAMADOL HYDROCHLORIDE 50 MG/1
50 TABLET ORAL EVERY 6 HOURS PRN
Qty: 20 TABLET | Refills: 0 | Status: SHIPPED | OUTPATIENT
Start: 2025-02-25 | End: 2025-03-02

## 2025-02-25 NOTE — ED PROVIDER NOTES
Encounter Date: 2/25/2025       History     Chief Complaint   Patient presents with    Altered Mental Status     C/o confusion starting 20min PTA     The history is provided by the patient and a relative.   Altered Mental Status  This is a new problem. The current episode started less than 1 hour ago. The problem occurs constantly. The problem has not changed since onset.Associated symptoms include headaches. Pertinent negatives include no chest pain and no shortness of breath. Nothing aggravates the symptoms. Nothing relieves the symptoms.   Admits to EtOH intake this AM.  States he suddenly developed a left-sided HA and felt confused.  Family states he drinks often.    Review of patient's allergies indicates:   Allergen Reactions    Proparacaine     Tropicamide      Other reaction(s): disoriented     Past Medical History:   Diagnosis Date    Adjustment disorder     Bilateral carpal tunnel syndrome     CAD (coronary artery disease)     Cervical radiculopathy     Chronic pain syndrome     CKD (chronic kidney disease)     Gout     HTN (hypertension)     Lumbar radiculopathy     Osteoarthritis     Pacemaker     Prostate cancer     Sleep apnea     Does not sleep with c-pap    Unspecified glaucoma      Past Surgical History:   Procedure Laterality Date    APPENDECTOMY      ARTHROCENTESIS OF HIP JOINT Right 03/29/2023    Procedure: R hip injection;  Surgeon: Tono Farah MD;  Location: [facility] OR;  Service: Orthopedics;  Laterality: Right;    Cataract surgery Bilateral     CERVICAL FUSION      COLONOSCOPY N/A 08/25/2022    Procedure: COLONOSCOPY;  Surgeon: Luis Felipe Mckeon III, MD;  Location: [facility];  Service: Endoscopy;  Laterality: N/A;    EPIDURAL STEROID INJECTION INTO CERVICAL SPINE      EPIDURAL STEROID INJECTION INTO LUMBAR SPINE      Fluoroscopy of spinal cord      INJECTION OF JOINT Right 07/27/2022    Procedure: Injection, Joint, Hip;  Surgeon: Tono Farah MD;  Location: [facility] OR;  Service:  Orthopedics;  Laterality: Right;    INSERTION OF PERMANENT PACEMAKER      INTRAOPERATIVE RADIATION THERAPY      LUNG REMOVAL, PARTIAL      Myelogram      PENILE PROSTHESIS IMPLANT      ROBOTIC ARTHROPLASTY,HIP,TOTAL,POSTERIOR APPROACH Right 1/31/2024    Procedure: ROBOTIC ARTHROPLASTY, HIP, TOTAL, POSTERIOR APPROACH;  Surgeon: Tono Farah MD;  Location: [facility];  Service: Orthopedics;  Laterality: Right;     Family History   Problem Relation Name Age of Onset    Heart disease Mother      Hypertension Mother      Heart disease Sister      Hypertension Sister      Heart disease Brother      Hypertension Brother       Social History[1]  Review of Systems   Constitutional:  Negative for fever.   HENT:  Negative for sore throat.    Respiratory:  Negative for shortness of breath.    Cardiovascular:  Negative for chest pain.   Gastrointestinal:  Negative for nausea.   Genitourinary:  Negative for dysuria.   Musculoskeletal:  Negative for back pain.   Skin:  Negative for rash.   Neurological:  Positive for headaches. Negative for weakness.   Hematological:  Does not bruise/bleed easily.       Physical Exam     Initial Vitals [02/25/25 1244]   BP Pulse Resp Temp SpO2   (!) 195/89 80 18 98.4 °F (36.9 °C) 97 %      MAP       --         Physical Exam    Nursing note and vitals reviewed.  Constitutional: He appears well-developed and well-nourished.   HENT:   Head: Normocephalic and atraumatic.   Right Ear: External ear normal.   Left Ear: External ear normal.   Nose: Nose normal.   Eyes: Conjunctivae and EOM are normal. Pupils are equal, round, and reactive to light.   Neck: Neck supple.   Normal range of motion.  Cardiovascular:  Normal rate, regular rhythm, normal heart sounds and intact distal pulses.           Pulmonary/Chest: Breath sounds normal.   Abdominal: Abdomen is soft. Bowel sounds are normal.   Musculoskeletal:         General: Normal range of motion.      Cervical back: Normal range of motion and neck  supple.     Neurological: He is alert and oriented to person, place, and time. He has normal strength. No cranial nerve deficit or sensory deficit. GCS score is 15. GCS eye subscore is 4. GCS verbal subscore is 5. GCS motor subscore is 6.   ? Expressive aphasia, though it seems to wax and wane throughout the interview - could be simply poor recall.  Strength is 5/5 all groups, all extremities.   Skin: Skin is warm and dry. Capillary refill takes less than 2 seconds.   Psychiatric: He has a normal mood and affect. His behavior is normal. Judgment and thought content normal.         ED Course   Procedures  Labs Reviewed   CBC WITH DIFFERENTIAL - Abnormal       Result Value    WBC 6.37      RBC 3.26 (*)     Hgb 10.3 (*)     Hct 31.5 (*)     MCV 96.6 (*)     MCH 31.6 (*)     MCHC 32.7 (*)     RDW 13.4      Platelet 204      MPV 10.4      Neut % 55.7      Lymph % 30.8      Mono % 8.8      Eos % 4.2      Basophil % 0.3      Imm Grans % 0.2      Neut # 3.55      Lymph # 1.96      Mono # 0.56      Eos # 0.27      Baso # 0.02      Imm Gran # 0.01      NRBC% 0.0     COMPREHENSIVE METABOLIC PANEL - Abnormal    Sodium 138      Potassium 3.8      Chloride 106      CO2 23      Glucose 104      Blood Urea Nitrogen 23.0      Creatinine 1.17      Calcium 9.4      Protein Total 7.7 (*)     Albumin 3.7      Globulin 4.0 (*)     Albumin/Globulin Ratio 0.9 (*)     Bilirubin Total 0.3      ALP 70      ALT 17      AST 18      eGFR >60      Anion Gap 9.0      BUN/Creatinine Ratio 20     COVID/RSV/FLU A&B PCR - Normal    Influenza A PCR Not Detected      Influenza B PCR Not Detected      Respiratory Syncytial Virus PCR Not Detected      SARS-CoV-2 PCR Not Detected      Narrative:     The Xpert Xpress SARS-CoV-2/FLU/RSV plus is a rapid, multiplexed real-time PCR test intended for the simultaneous qualitative detection and differentiation of SARS-CoV-2, Influenza A, Influenza B, and respiratory syncytial virus (RSV) viral RNA in either  nasopharyngeal swab or nasal swab specimens.         MAGNESIUM - Normal    Magnesium Level 1.90     TROPONIN I - Normal    Troponin-I <0.010     TSH - Normal    TSH 0.855     URINALYSIS, REFLEX TO URINE CULTURE - Normal    Color, UA Yellow      Appearance, UA Clear      Specific Gravity, UA 1.020      pH, UA 5.5      Protein, UA Negative      Glucose, UA Negative      Ketones, UA Negative      Blood, UA Negative      Bilirubin, UA Negative      Urobilinogen, UA 0.2      Nitrites, UA Negative      Leukocyte Esterase, UA Negative     ALCOHOL,MEDICAL (ETHANOL) - Normal    Ethanol Level <10.0     DRUG SCREEN, URINE (BEAKER) - Normal    Amphetamines, Urine Negative      Barbiturates, Urine Negative      Benzodiazepine, Urine Negative      Cannabinoids, Urine Negative      Cocaine, Urine Negative      Fentanyl, Urine Negative      MDMA, Urine Negative      Opiates, Urine Negative      Phencyclidine, Urine Negative      pH, Urine 6.5      Specific Gravity, Urine Auto 1.025      Narrative:     Cut off concentrations:    Amphetamines - 1000 ng/ml  Barbiturates - 200 ng/ml  Benzodiazepine - 200 ng/ml  Cannabinoids (THC) - 50 ng/ml  Cocaine - 300 ng/ml  Fentanyl - 1.0 ng/ml  MDMA - 500 ng/ml  Opiates - 300 ng/ml   Phencyclidine (PCP) - 25 ng/ml    Specimen submitted for drug analysis and tested for pH and specific gravity in order to evaluate sample integrity. Suspect tampering if specific gravity is <1.003 and/or pH is not within the range of 4.5 - 8.0  False negatives may result form substances such as bleach added to urine.  False positives may result for the presence of a substance with similar chemical structure to the drug or its metabolite.    This test provides only a PRELIMINARY analytical test result. A more specific alternate chemical method must be used in order to obtain a confirmed analytical result. Gas chromatography/mass spectrometry (GC/MS) is the preferred confirmatory method. Other chemical confirmation  methods are available. Clinical consideration and professional judgement should be applied to any drug of abuse test result, particularly when preliminary positive results are used.    Positive results will be confirmed only at the physicians request. Unconfirmed screening results are to be used only for medical purposes (treatment).        AMMONIA - Normal    Ammonia Level 66.3     APTT - Normal    PTT 27.0     PROTIME-INR - Normal    PT 13.4      INR 1.0      Narrative:     Protimes are used to monitor anticoagulant agents such as warfarin. PT INR values are based on the current patient normal mean and the ALLIE value for the specific instrument reagent used.  **Routine theraputic target values for the INR are 2.0-3.0**   CBC W/ AUTO DIFFERENTIAL    Narrative:     The following orders were created for panel order CBC auto differential.  Procedure                               Abnormality         Status                     ---------                               -----------         ------                     CBC with Differential[[id number]]       Abnormal            Final result                 Please view results for these tests on the individual orders.   POCT GLUCOSE    POCT Glucose 106       EKG Readings: (Independently Interpreted)   Initial Reading: No STEMI. Rhythm: Normal Sinus Rhythm. Heart Rate: 73. Ectopy: No Ectopy. Conduction: RBBB. ST Segments: Normal ST Segments. T Waves: Normal. Axis: Normal. Clinical Impression: Normal Sinus Rhythm with RBBB     ECG Results              EKG 12-lead (Final result)        Collection Time Result Time QRS Duration OHS QTC Calculation    02/25/25 13:14:17 02/25/25 14:55:58 132 469                     Final result by Interface, Lab [facility] (02/25/25 14:56:09)                   Narrative:    Test Reason : R41.82,    Vent. Rate :  73 BPM     Atrial Rate :  73 BPM     P-R Int : 208 ms          QRS Dur : 132 ms      QT Int : 426 ms       P-R-T Axes :  97  37  56 degrees     QTcB Int : 469 ms    Normal sinus rhythm  Right bundle branch block  Abnormal ECG    Confirmed by Germain Lockwood ([id number]) on 2/25/2025 2:55:57 PM    Referred By: JESSICA SELF           Confirmed By: Germain Lockwood                                  Imaging Results              CT Head Without Contrast (Final result)  Result time 02/25/25 14:15:45      Final result by Ramin Pérez MD (02/25/25 14:15:45)                   Impression:      1. No acute intracranial abnormality identified  2. Generalized cerebral and cerebellar atrophy  3. Findings and other details as above      Electronically signed by: Ramin Pérez  Date:    02/25/2025  Time:    14:15               Narrative:    EXAMINATION:  CT HEAD WITHOUT CONTRAST    CLINICAL HISTORY:  Mental status change, unknown cause;, .    TECHNIQUE:  PATIENT RADIATION DOSE: DLP(mGycm) 84    As per PQRS measures, all CT scans at this facility used dose modulation, iterative reconstruction, and/or weight based dose adjustment when appropriate to reduce radiation dose to as low as reasonably achievable.    COMPARISON:  04/27/2024    FINDINGS:  Serial axial images were obtained of the head without the administration of IV contrast. Both brain and bone parenchymal windows were obtained.  Additional coronal and sagittal reconstructions were obtained.  Ventricles, cisterns, and sulci are prominent in size.  There is no evidence of intracranial hemorrhage, midline shift, mass effect, or abnormal extra-axial fluid collections.  Scattered hypodensities are noted to the periventricular white matter suspicious for small vessel ischemic changes.  Intracranial atherosclerosis is seen.  Cerebellar tonsils extend caudally to the level of foramen magnum.  There is beam hardening artifact at the skull base.  Bony defect/architectural distortion noted to the medial maxillary sinus walls bilaterally and ethmoid sinuses suspicious for postsurgical changes.  There is mild scattered  mucosal thickening at the ethmoid sinuses.  Mastoid air cells are aerated bilaterally.  External auditory canals are grossly patent.                                       X-Ray Chest AP Portable (Final result)  Result time 02/25/25 14:43:45      Final result by Ramin Pérez MD (02/25/25 14:43:45)                   Impression:      1. No active cardiopulmonary disease identified      Electronically signed by: Ramin Pérez  Date:    02/25/2025  Time:    14:43               Narrative:    EXAMINATION:  XR CHEST AP PORTABLE    CLINICAL HISTORY:  altered mental status;, .    COMPARISON:  04/27/2024    FINDINGS:  An AP view or more reveals the heart to be normal in size.  The trachea is midline.  A cardiac device is noted to the left chest.  No infiltrate or effusion is seen.  Degenerative changes and mild curvature are noted to the thoracic spine.                                       Medications - No data to display  Medical Decision Making  Amount and/or Complexity of Data Reviewed  Labs: ordered. Decision-making details documented in ED Course.  Radiology: ordered. Decision-making details documented in ED Course.  ECG/medicine tests: ordered and independent interpretation performed. Decision-making details documented in ED Course.    Differential includes:  CVA, migraine, ICH, intoxication, electrolyte disturbance, anemia, hepatic encephalopathy, infectious process, dysrhythmia, dementia, psychiatric illness.  Will obtain head CT, EKG, CXR, CBC, CMP, mag, PT/PTT, ammonia, UA, UDS, EtOH.  Consider admission for acute mental status change.           ED Course as of 02/25/25 1517   Tue Feb 25, 2025   1511 Feeling much better.  HA and BP improved without intervention.  Family states he is back to baseline.  ? Complex migraine.  Patient states he has been having left-sided neck pains for quite some time now - possibly cervicogenic headache?  Discussed CT and labs - all unremarkable.  He would like to go home now that  he is feeling better.  Encouraged near-term F/U with his PCP. [CL]      ED Course User Index  [CL] Andrei Ware MD                           Clinical Impression:  Final diagnoses:  [R41.82] Altered mental status  [R40.4] Transient alteration of awareness (Primary)  [R51.9] Nonintractable headache, unspecified chronicity pattern, unspecified headache type  [I10] Primary hypertension          ED Disposition Condition    Discharge Stable          ED Prescriptions       Medication Sig Dispense Start Date End Date Auth. Provider    traMADoL (ULTRAM) 50 mg tablet Take 1 tablet (50 mg total) by mouth every 6 (six) hours as needed for Pain. Final diagnoses: [R41.82] Altered mental status [R40.4] Transient alteration of awareness (Primary) [R51.9] Nonintractable headache, unspecified chronicity pattern, unspecified headache type [I10] Primary hypertension 20 tablet 2/25/2025 3/2/2025 Andrei Ware MD          Follow-up Information       Follow up With Specialties Details Why Contact Info    Florin Birmingham MD Family Medicine Schedule an appointment as soon as possible for a visit   [id number]  [place] [address]  [phone]                   [1]   Social History  Tobacco Use    Smoking status: Never    Smokeless tobacco: Never   Substance Use Topics    Alcohol use: Yes     Alcohol/week: 1.0 standard drink of alcohol     Types: 1 Shots of liquor per week     Comment: RARE    Drug use: Never        Andrei Ware MD  02/25/25 [id number]

## (undated) DEVICE — APPLICATOR CHLORAPREP ORN 26ML

## (undated) DEVICE — RETRIEVER SUTURE HEWSON DISP

## (undated) DEVICE — BANDAGE ADHESIVE FABRIC 2X4

## (undated) DEVICE — SOL POVIDONE IODINE PCH 3/4OZ

## (undated) DEVICE — COVER HD BACK TABLE 6FT

## (undated) DEVICE — SOL NACL IRR 1000ML BTL

## (undated) DEVICE — TAPE ADH MEDIPORE 4 X 10YDS

## (undated) DEVICE — CULTSWAB+ AMIES W/O CHARC DBL

## (undated) DEVICE — GLOVE PROTEXIS HYDROGEL SZ8.5

## (undated) DEVICE — SEE MEDLINE ITEM 146410

## (undated) DEVICE — DEVICE STRATAFIX SYMMETRIC +

## (undated) DEVICE — GLOVE SIGNATURE ESSNTL LTX 8.5

## (undated) DEVICE — TOWEL OR BLUE STRL 16X26 4/PK

## (undated) DEVICE — DRAPE FULL SHEET 70X100IN

## (undated) DEVICE — GLOVE SENSICARE PI GRN 8.5

## (undated) DEVICE — PIN BONE 4 X 140MM STERILE
Type: IMPLANTABLE DEVICE | Site: HIP | Status: NON-FUNCTIONAL
Removed: 2024-01-31

## (undated) DEVICE — NDL HYPO REG 25G X 1 1/2

## (undated) DEVICE — Device

## (undated) DEVICE — DRAPE MEDIUM SHEET 40X70IN

## (undated) DEVICE — ELECTRODE PATIENT RETURN DISP

## (undated) DEVICE — PAD ABD 8X10 STERILE

## (undated) DEVICE — SYR DISP LL 5CC

## (undated) DEVICE — GOWN POLY REINF X-LONG 2XL

## (undated) DEVICE — GLOVE SENSICARE PI GRN 7

## (undated) DEVICE — KIT CHECKPOINT TIBIAL

## (undated) DEVICE — CONTRAST ISOVUE 300 50ML

## (undated) DEVICE — KIT SURGICAL TURNOVER

## (undated) DEVICE — NDL ANES SPINAL 18X3.5ST 18G

## (undated) DEVICE — DRESSING XEROFORM FOIL PK 1X8

## (undated) DEVICE — GLOVE SENSICARE PI ORTHO LT 8

## (undated) DEVICE — BLADE SAG DUAL CUT 18X90X1.35

## (undated) DEVICE — STAPLER SKIN PROXIMATE WIDE

## (undated) DEVICE — KIT TOTAL HIP HLGC

## (undated) DEVICE — NDL SAFETY 25G X 1.5 ECLIPSE

## (undated) DEVICE — SUT BLU BR 2 TAPERD NDL 1/2

## (undated) DEVICE — SYR 30CC LUER LOCK

## (undated) DEVICE — SOL NORMAL USPCA 0.9%

## (undated) DEVICE — SYR 10CC LUER LOCK

## (undated) DEVICE — NDL BLUNT FILL 18G 1IN

## (undated) DEVICE — KIT SURGICAL COLON .25 1.1OZ

## (undated) DEVICE — SUT MONO 2-0 CT-1 VIL

## (undated) DEVICE — GLOVE SENSICARE PI MICRO 6.5

## (undated) DEVICE — DRAPE STERI U-SHAPED 47X51IN

## (undated) DEVICE — SPONGE GAUZE 16PLY 4X4

## (undated) DEVICE — KIT TRACKING VIZADISC HIP

## (undated) DEVICE — DRAPE SURG W/TWL 17 5/8X23

## (undated) DEVICE — GAUZE SPONGE 4X4 12PLY

## (undated) DEVICE — FORCEP CAPTURA PRO SPK 230CM

## (undated) DEVICE — KIT DRAPE RIO ONE PIECE W/POCK

## (undated) DEVICE — DRAPE INCISE IOBAN 2 23X23IN